# Patient Record
Sex: MALE | Race: BLACK OR AFRICAN AMERICAN | NOT HISPANIC OR LATINO | Employment: UNEMPLOYED | URBAN - METROPOLITAN AREA
[De-identification: names, ages, dates, MRNs, and addresses within clinical notes are randomized per-mention and may not be internally consistent; named-entity substitution may affect disease eponyms.]

---

## 2020-06-03 ENCOUNTER — OFFICE VISIT (OUTPATIENT)
Dept: URGENT CARE | Facility: CLINIC | Age: 50
End: 2020-06-03
Payer: MEDICAID

## 2020-06-03 VITALS
OXYGEN SATURATION: 98 % | HEIGHT: 71 IN | TEMPERATURE: 97 F | WEIGHT: 315 LBS | HEART RATE: 93 BPM | BODY MASS INDEX: 44.1 KG/M2 | RESPIRATION RATE: 18 BRPM

## 2020-06-03 DIAGNOSIS — Z11.9 ENCOUNTER FOR SCREENING EXAMINATION FOR INFECTIOUS DISEASE: Primary | ICD-10-CM

## 2020-06-03 DIAGNOSIS — Z01.84 ANTIBODY RESPONSE EXAMINATION: ICD-10-CM

## 2020-06-03 PROCEDURE — U0003 INFECTIOUS AGENT DETECTION BY NUCLEIC ACID (DNA OR RNA); SEVERE ACUTE RESPIRATORY SYNDROME CORONAVIRUS 2 (SARS-COV-2) (CORONAVIRUS DISEASE [COVID-19]), AMPLIFIED PROBE TECHNIQUE, MAKING USE OF HIGH THROUGHPUT TECHNOLOGIES AS DESCRIBED BY CMS-2020-01-R: HCPCS

## 2020-06-03 PROCEDURE — 99211 PR OFFICE/OUTPT VISIT, EST, LEVL I: ICD-10-PCS | Mod: S$GLB,,, | Performed by: PHYSICIAN ASSISTANT

## 2020-06-03 PROCEDURE — 99211 OFF/OP EST MAY X REQ PHY/QHP: CPT | Mod: S$GLB,,, | Performed by: PHYSICIAN ASSISTANT

## 2020-06-03 NOTE — PROGRESS NOTES
"Subjective:       Patient ID: Haseeb Reilly is a 49 y.o. male.    Vitals:  height is 5' 11" (1.803 m) and weight is 153.8 kg (339 lb) (abnormal). His temperature is 97.3 °F (36.3 °C). His pulse is 93. His respiration is 18 and oxygen saturation is 98%.     Chief Complaint: Labs Only (covid-19 test)    49-year-old male who presents for covid-19 test and antibody testing.  He has no symptoms at this time.  Denies any URI symptoms, abdominal symptoms, chest pain, shortness of breath, focal weakness or deficits, or gait instability.  Denies any recent exposure to cover positive patients or recent travel.          Constitution: Negative for activity change, chills, sweating, fatigue, fever and generalized weakness.   HENT: Negative for ear pain, hearing loss, congestion, postnasal drip, sinus pain, sinus pressure, sore throat, trouble swallowing and voice change.    Neck: Negative for neck stiffness and painful lymph nodes.   Cardiovascular: Negative for chest pain, leg swelling, palpitations, sob on exertion and passing out.   Eyes: Negative for eye discharge, eye pain, eye redness, photophobia, vision loss, double vision and blurred vision.   Respiratory: Negative for chest tightness, cough, sputum production, bloody sputum, COPD, shortness of breath, stridor, wheezing and asthma.    Gastrointestinal: Negative for abdominal pain, nausea, vomiting, diarrhea, rectal bleeding and bowel incontinence.   Genitourinary: Negative for dysuria, frequency, urgency, urine decreased, flank pain, bladder incontinence, hematuria and history of kidney stones.   Musculoskeletal: Negative for joint pain, joint swelling, abnormal ROM of joint, muscle cramps and muscle ache.   Skin: Negative for color change, pale, rash and wound.   Allergic/Immunologic: Negative for seasonal allergies, asthma and immunocompromised state.   Neurological: Negative for dizziness, history of vertigo, light-headedness, passing out, facial drooping, speech " difficulty, coordination disturbances, loss of balance, headaches, disorientation, altered mental status, loss of consciousness, numbness, tingling and seizures.   Hematologic/Lymphatic: Negative for swollen lymph nodes, easy bruising/bleeding and trouble clotting. Does not bruise/bleed easily.   Psychiatric/Behavioral: Negative for altered mental status and disorientation.       Objective:      Physical Exam   Vitals:    06/03/20 1332   Pulse: 93   Resp: 18   Temp: 97.3 °F (36.3 °C)         Patient was seen remotely due to State of Emergency for the COVID-19 outbreak.  Vitals and nursing notes reviewed.       General: well developed, well nourished, no acute distress. Patient does not appear toxic.  Neurologic: Awake, alert and oriented. Thought content appropriate.  Language: no aphasia  Speech: no dysarthria   Motor Strength: Moves all extremities spontaneously with good tone. No abnormal movements seen.    No difficulty transitioning from seated to standing position or vice versa.  Patient is ambulating without any difficulty with normal gait.  normocephalic atraumatic.  normal hearing and responding with full sentences.  Heart: no cyanosis or pallor  Lungs: normal respiratory effort on room air. Breathing is unlabored.  Skin: not diaphoretic        Assessment:       1. Encounter for screening examination for infectious disease    2. Antibody response examination          Patient presents for COVID nasal swab and antibody testing.  Patient is asymptomatic at this time.    Limited physical exam due to COVID-19 concerns.   Patient was counseled, explained with the test results meaning, and answered all of questions.  Patient will be notified of results in the next 24-48 hours.  They can also receive results via my chart.  Printed and verbal COVID guidelines were given.  ED versus clinic precautions given.  Patient verbalized understanding and agreed with plan of care.      Addendum 06/03/2020 at 2:00 p.m.  Unable  to obtain blood draw on 1st attempt.  Patient refused any additional blood draw for COVID antibody testing.  Plan:         Encounter for screening examination for infectious disease  -     COVID-19 Routine Screening    Antibody response examination  -     COVID-19 (SARS CoV-2) IgG Antibody            Patient Instructions     PLEASE READ YOUR DISCHARGE INSTRUCTIONS ENTIRELY AS IT CONTAINS IMPORTANT INFORMATION.    Patient had covid testing done today.  We will notify you of your results in the next 1-3 days. You can also receive the results on my chart.  Discussed corona virus precautions and reviewed CDC FAC; printed a copy for patient.  I discussed to continue to monitor their symptoms. Discussed that if their symptoms persist or worsen to seek re-evaluation. Clinic vs. ER precautions were given.  Patient verbalized understanding and agreed with the above plan of care.      -You must understand that you've received an Urgent Care treatment only and that you may be released before all your medical problems are known or treated. You, the patient, will    arrange for follow up care as instructed. Please arrange follow up with your primary medical clinic within 2-5 days if your signs and symptoms have not resolved or worsen.   - Follow up with your PCP or specialty clinic as directed.  You can call [phone] to schedule an appointment with the appropriate provider.    - If your condition worsens or fails to improve we recommend that you receive another evaluation at the emergency room immediately or contact your primary medical clinic to discuss your concerns.                Instructions for Patients Awaiting COVID-19 Test Results    You will either be called with your test result or it will be released to the patient portal.  If you have any questions about your test, please visit [email or web address] or call our COVID-19 information line at [phone].    Prevention steps for patients with confirmed  or suspected COVID-19       Stay home and stay away from family members and friends. The CDC says, you can leave home after these three things have happened: 1) You have had no fever for at least 72 hours (that is three full days of no fever without the use of medicine that reduces fevers) 2) AND other symptoms have improved (for example, when your cough or shortness of breath have improved) 3) AND at least 7 days have passed since your symptoms first appeared.   Separate yourself from other people and animals in your home.   Call ahead before visiting your doctor.   Wear a facemask.   Cover your coughs and sneezes.   Wash your hands often with soap and water; hand  can be used, too.   Avoid sharing personal household items.   Wipe down surfaces used daily.   Monitor your symptoms. Seek prompt medical attention if your illness is worsening (e.g., difficulty breathing).    Before seeking care, call your healthcare provider.   If you have a medical emergency and need to call 911, notify the dispatch personnel that you have, or are being evaluated for COVID-19. If possible, put on a facemask before emergency medical services arrive.        Recommended precautions for household members, intimate partners, and caregivers in a home setting of a patient with symptomatic laboratory-confirmed COVID-19 or a patient under investigation.  Household members, intimate partners, and caregivers in the home setting awaiting tests results have close contact with a person with symptomatic, laboratory-confirmed COVID-19 or a person under investigation. Close contacts should monitor their health; they should call their provider right away if they develop symptoms suggestive of COVID-19 (e.g., fever, cough, shortness of breath).    Close contacts should also follow these recommendations:   Make sure that you understand and can help the patient follow their provider's instructions for medication(s) and care. You  should help the patient with basic needs in the home and provide support for getting groceries, prescriptions, and other personal needs.   Monitor the patient's symptoms. If the patient is getting sicker, call his or her healthcare provider and tell them that the patient has laboratory-confirmed COVID-19. If the patient has a medical emergency and you need to call 911, notify the dispatch personnel that the patient has, or is being evaluated for COVID-19.   Household members should stay in another room or be  from the patient. Household members should use a separate bedroom and bathroom, if available.   Prohibit visitors.   Household members should care for any pets in the home.   Make sure that shared spaces in the home have good air flow, such as by an air conditioner or an opened window, weather permitting.   Perform hand hygiene frequently. Wash your hands often with soap and water for at least 20 seconds or use an alcohol-based hand  (that contains > 60% alcohol) covering all surfaces of your hands and rubbing them together until they feel dry. Soap and water should be used preferentially.   Avoid touching your eyes, nose, and mouth.   The patient should wear a facemask. If the patient is not able to wear a facemask (for example, because it causes trouble breathing), caregivers should wear a mask when they are in the same room as the patient.   Wear a disposable facemask and gloves when you touch or have contact with the patient's blood, stool, or body fluids, such as saliva, sputum, nasal mucus, vomit, urine.  o Throw out disposable facemasks and gloves after using them. Do not reuse.  o When removing personal protective equipment, first remove and dispose of gloves. Then, immediately clean your hands with soap and water or alcohol-based hand . Next, remove and dispose of facemask, and immediately clean your hands again with soap and water or alcohol-based hand  .   You should not share dishes, drinking glasses, cups, eating utensils, towels, bedding, or other items with the patient. After the patient uses these items, you should wash them thoroughly (see below Wash laundry thoroughly).   Clean all high-touch surfaces, such as counters, tabletops, doorknobs, bathroom fixtures, toilets, phones, keyboards, tablets, and bedside tables, every day. Also, clean any surfaces that may have blood, stool, or body fluids on them.   Use a household cleaning spray or wipe, according to the label instructions. Labels contain instructions for safe and effective use of the cleaning product including precautions you should take when applying the product, such as wearing gloves and making sure you have good ventilation during use of the product.   Wash laundry thoroughly.  o Immediately remove and wash clothes or bedding that have blood, stool, or body fluids on them.  o Wear disposable gloves while handling soiled items and keep soiled items away from your body. Clean your hands (with soap and water or an alcohol-based hand ) immediately after removing your gloves.  o Read and follow directions on labels of laundry or clothing items and detergent. In general, using a normal laundry detergent according to washing machine instructions and dry thoroughly using the warmest temperatures recommended on the clothing label.   Place all used disposable gloves, facemasks, and other contaminated items in a lined container before disposing of them with other household waste. Clean your hands (with soap and water or an alcohol-based hand ) immediately after handling these items. Soap and water should be used preferentially if hands are visibly dirty.   Discuss any additional questions with your state or local health department or healthcare provider. Check available hours when contacting your local health department.    For more information see CDC link below.       https://www.cdc.gov/coronavirus/2019-ncov/hcp/guidance-prevent-spread.html#precautions        Sources:  [facility], [place] [facility]          Instructions for Home Care of Patients and Caretakers with Coronavirus Disease 2019     Limit visitors to the home.  Older persons and those that have chronic medical conditions such as diabetes, lung and heart disease are at increased risk for illness.    If possible, patients should use a separate bedroom while recovering. Caregivers and household members should avoid prolonged contact with the patient which means to stay 6 feet away and avoid contact with cough droplets.  When close contact is necessary, wash your hands before and immediately after contact.    Perform hand hygiene frequently. Wash your hands often with soap and water for at least 20 seconds or use an alcohol-based hand , covering all surfaces of your hands and rubbing them together until they feel dry.    Avoid touching your eyes, nose, and mouth with unwashed hands.   Avoid sharing household items with the patient. You should not share dishes, drinking glasses, cups, eating utensils, towels, bedding, or other items. After the patient uses these items, you should wash them thoroughly.   Wash laundry thoroughly.   o Immediately remove and wash clothes or bedding that have blood, stool, or body fluids on them.   Clean all high-touch surfaces, such as counters, tabletops, doorknobs, bathroom fixtures, toilets, phones, keyboards, tablets, and bedside tables, every day.   o Use a household cleaning spray or wipe, according to the label instructions. Labels contain instructions for safe and effective use of the cleaning product including precautions you should take when applying the product, such as wearing gloves and making sure you have good ventilation during use of the product.    For more information see CDC link below.       https://www.cdc.gov/coronavirus/2019-ncov/hcp/guidance-prevent-spread.html#precautions               If your symptoms worsen or if you have any other concerns, please contact [facility] at [phone].          PLEASE READ YOUR DISCHARGE INSTRUCTIONS ENTIRELY AS IT CONTAINS IMPORTANT INFORMATION.    Patient had covid testing done today.  We will notify you of your results in the next 1-3 days. You can also receive the results on my chart.    Discussed corona virus precautions and reviewed CDC FAC; printed a copy for patient.  I discussed to continue to monitor their symptoms. Discussed that if their symptoms persist or worsen to seek re-evaluation. Clinic vs. ER precautions were given.  Patient verbalized understanding and agreed with the above plan of care.      -You must understand that you've received an Urgent Care treatment only and that you may be released before all your medical problems are known or treated. You, the patient, will    arrange for follow up care as instructed. Please arrange follow up with your primary medical clinic within 2-5 days if your signs and symptoms have not resolved or worsen.   - Follow up with your PCP or specialty clinic as directed.  You can call [phone] to schedule an appointment with the appropriate provider.    - If your condition worsens or fails to improve we recommend that you receive another evaluation at the emergency room immediately or contact your primary medical clinic to discuss your concerns.                Instructions for Patients Awaiting COVID-19 Test Results    You will either be called with your test result or it will be released to the patient portal.  If you have any questions about your test, please visit [email or web address] or call our COVID-19 information line at [phone].    Prevention steps for patients with confirmed or suspected COVID-19       Stay home and stay away from family members and friends. The CDC says, you  can leave home after these three things have happened: 1) You have had no fever for at least 72 hours (that is three full days of no fever without the use of medicine that reduces fevers) 2) AND other symptoms have improved (for example, when your cough or shortness of breath have improved) 3) AND at least 7 days have passed since your symptoms first appeared.   Separate yourself from other people and animals in your home.   Call ahead before visiting your doctor.   Wear a facemask.   Cover your coughs and sneezes.   Wash your hands often with soap and water; hand  can be used, too.   Avoid sharing personal household items.   Wipe down surfaces used daily.   Monitor your symptoms. Seek prompt medical attention if your illness is worsening (e.g., difficulty breathing).    Before seeking care, call your healthcare provider.   If you have a medical emergency and need to call 911, notify the dispatch personnel that you have, or are being evaluated for COVID-19. If possible, put on a facemask before emergency medical services arrive.        Recommended precautions for household members, intimate partners, and caregivers in a home setting of a patient with symptomatic laboratory-confirmed COVID-19 or a patient under investigation.  Household members, intimate partners, and caregivers in the home setting awaiting tests results have close contact with a person with symptomatic, laboratory-confirmed COVID-19 or a person under investigation. Close contacts should monitor their health; they should call their provider right away if they develop symptoms suggestive of COVID-19 (e.g., fever, cough, shortness of breath).    Close contacts should also follow these recommendations:   Make sure that you understand and can help the patient follow their provider's instructions for medication(s) and care. You should help the patient with basic needs in the home and provide support for getting groceries,  prescriptions, and other personal needs.   Monitor the patient's symptoms. If the patient is getting sicker, call his or her healthcare provider and tell them that the patient has laboratory-confirmed COVID-19. If the patient has a medical emergency and you need to call 911, notify the dispatch personnel that the patient has, or is being evaluated for COVID-19.   Household members should stay in another room or be  from the patient. Household members should use a separate bedroom and bathroom, if available.   Prohibit visitors.   Household members should care for any pets in the home.   Make sure that shared spaces in the home have good air flow, such as by an air conditioner or an opened window, weather permitting.   Perform hand hygiene frequently. Wash your hands often with soap and water for at least 20 seconds or use an alcohol-based hand  (that contains > 60% alcohol) covering all surfaces of your hands and rubbing them together until they feel dry. Soap and water should be used preferentially.   Avoid touching your eyes, nose, and mouth.   The patient should wear a facemask. If the patient is not able to wear a facemask (for example, because it causes trouble breathing), caregivers should wear a mask when they are in the same room as the patient.   Wear a disposable facemask and gloves when you touch or have contact with the patient's blood, stool, or body fluids, such as saliva, sputum, nasal mucus, vomit, urine.  o Throw out disposable facemasks and gloves after using them. Do not reuse.  o When removing personal protective equipment, first remove and dispose of gloves. Then, immediately clean your hands with soap and water or alcohol-based hand . Next, remove and dispose of facemask, and immediately clean your hands again with soap and water or alcohol-based hand .   You should not share dishes, drinking glasses, cups, eating utensils, towels, bedding, or other  items with the patient. After the patient uses these items, you should wash them thoroughly (see below Wash laundry thoroughly).   Clean all high-touch surfaces, such as counters, tabletops, doorknobs, bathroom fixtures, toilets, phones, keyboards, tablets, and bedside tables, every day. Also, clean any surfaces that may have blood, stool, or body fluids on them.   Use a household cleaning spray or wipe, according to the label instructions. Labels contain instructions for safe and effective use of the cleaning product including precautions you should take when applying the product, such as wearing gloves and making sure you have good ventilation during use of the product.   Wash laundry thoroughly.  o Immediately remove and wash clothes or bedding that have blood, stool, or body fluids on them.  o Wear disposable gloves while handling soiled items and keep soiled items away from your body. Clean your hands (with soap and water or an alcohol-based hand ) immediately after removing your gloves.  o Read and follow directions on labels of laundry or clothing items and detergent. In general, using a normal laundry detergent according to washing machine instructions and dry thoroughly using the warmest temperatures recommended on the clothing label.   Place all used disposable gloves, facemasks, and other contaminated items in a lined container before disposing of them with other household waste. Clean your hands (with soap and water or an alcohol-based hand ) immediately after handling these items. Soap and water should be used preferentially if hands are visibly dirty.   Discuss any additional questions with your state or local health department or healthcare provider. Check available hours when contacting your local health department.    For more information see CDC link below.      https://www.cdc.gov/coronavirus/2019-ncov/hcp/guidance-prevent-spread.html#precautions        Sources:  CDC,  [place] [facility]          Instructions for Home Care of Patients and Caretakers with Coronavirus Disease 2019     Limit visitors to the home.  Older persons and those that have chronic medical conditions such as diabetes, lung and heart disease are at increased risk for illness.    If possible, patients should use a separate bedroom while recovering. Caregivers and household members should avoid prolonged contact with the patient which means to stay 6 feet away and avoid contact with cough droplets.  When close contact is necessary, wash your hands before and immediately after contact.    Perform hand hygiene frequently. Wash your hands often with soap and water for at least 20 seconds or use an alcohol-based hand , covering all surfaces of your hands and rubbing them together until they feel dry.    Avoid touching your eyes, nose, and mouth with unwashed hands.   Avoid sharing household items with the patient. You should not share dishes, drinking glasses, cups, eating utensils, towels, bedding, or other items. After the patient uses these items, you should wash them thoroughly.   Wash laundry thoroughly.   o Immediately remove and wash clothes or bedding that have blood, stool, or body fluids on them.   Clean all high-touch surfaces, such as counters, tabletops, doorknobs, bathroom fixtures, toilets, phones, keyboards, tablets, and bedside tables, every day.   o Use a household cleaning spray or wipe, according to the label instructions. Labels contain instructions for safe and effective use of the cleaning product including precautions you should take when applying the product, such as wearing gloves and making sure you have good ventilation during use of the product.    For more information see CDC link below.      https://www.cdc.gov/coronavirus/2019-ncov/hcp/guidance-prevent-spread.html#precautions               If your symptoms worsen or if you have any other  concerns, please contact [facility] at [phone].        What does the antibody test tell me?  The antibody test is a blood test that determines if a person's immune system has created antibodies in response to COVID-19. Presence of the antibody indicates the individual has been previously infected with COVID-19. It is important to note that this test does not prove that a person is immune to future infection with COVID-19. Because this virus is new, there is not enough information at this time to determine what defines COVID-19 immunity and how long immunity may last. We understand a test like this could create a false sense of security. It is critical that everyone,regardless of test status or result, continues to follow the latest social distancing, PPE protection and infection control measures.     Those with a positive test should be aware that they have been infected. These individuals are still required to wear appropriate PPE as advised throughout this COVID-19 pandemic. Those with a negative test should be aware that they have not been exposed or developed antibodies to COVID-19. They should maintain the guidance on appropriate PPE as advised throughout this COVID-19 pandemic.         What is the turnaround time of the antibody test?  The antibody test results are usually provided within 24-36 hours of collection, depending on the testing Location.           I've heard that these tests are unreliable?  This test has been vetted through our infectious disease and pathology leadership. This test has not been approved by the U.S. Food and Drug Administration (FDA). This test is currently commercially available under the Emergency Use Authorization, meaning that the FDA is allowing it during this public health emergency. The COVID-19 virus is new and there is no perfect test.           If my antibody test is positive, is there any medication or treatment I should seek?  No, at this time, there is no  "definitive therapy being recommended or used for patients with positive antibody test. Regardless of test status or result, you should continue to follow the latest social distancing, PPE protection and infection control measures.        How will I get my results?  Results will sent to your [facility] patient portal where you can view them. You can download the "Keep Me Certified" wilian in your smart phone's Wilian store.  You can also visit  [email or web address]  to set up your portal. You may contact [email or web address] or [facility] Support Line at [phone] for assistance.                     "

## 2020-06-03 NOTE — PATIENT INSTRUCTIONS
PLEASE READ YOUR DISCHARGE INSTRUCTIONS ENTIRELY AS IT CONTAINS IMPORTANT INFORMATION.    Patient had covid testing done today.  We will notify you of your results in the next 1-3 days. You can also receive the results on my chart.  Discussed corona virus precautions and reviewed CDC FAC; printed a copy for patient.  I discussed to continue to monitor their symptoms. Discussed that if their symptoms persist or worsen to seek re-evaluation. Clinic vs. ER precautions were given.  Patient verbalized understanding and agreed with the above plan of care.      -You must understand that you've received an Urgent Care treatment only and that you may be released before all your medical problems are known or treated. You, the patient, will    arrange for follow up care as instructed. Please arrange follow up with your primary medical clinic within 2-5 days if your signs and symptoms have not resolved or worsen.   - Follow up with your PCP or specialty clinic as directed.  You can call [phone] to schedule an appointment with the appropriate provider.    - If your condition worsens or fails to improve we recommend that you receive another evaluation at the emergency room immediately or contact your primary medical clinic to discuss your concerns.                Instructions for Patients Awaiting COVID-19 Test Results    You will either be called with your test result or it will be released to the patient portal.  If you have any questions about your test, please visit [email or web address] or call our COVID-19 information line at [phone].    Prevention steps for patients with confirmed or suspected COVID-19       Stay home and stay away from family members and friends. The CDC says, you can leave home after these three things have happened: 1) You have had no fever for at least 72 hours (that is three full days of no fever without the use of medicine that reduces fevers) 2) AND other symptoms have  improved (for example, when your cough or shortness of breath have improved) 3) AND at least 7 days have passed since your symptoms first appeared.   Separate yourself from other people and animals in your home.   Call ahead before visiting your doctor.   Wear a facemask.   Cover your coughs and sneezes.   Wash your hands often with soap and water; hand  can be used, too.   Avoid sharing personal household items.   Wipe down surfaces used daily.   Monitor your symptoms. Seek prompt medical attention if your illness is worsening (e.g., difficulty breathing).    Before seeking care, call your healthcare provider.   If you have a medical emergency and need to call 911, notify the dispatch personnel that you have, or are being evaluated for COVID-19. If possible, put on a facemask before emergency medical services arrive.        Recommended precautions for household members, intimate partners, and caregivers in a home setting of a patient with symptomatic laboratory-confirmed COVID-19 or a patient under investigation.  Household members, intimate partners, and caregivers in the home setting awaiting tests results have close contact with a person with symptomatic, laboratory-confirmed COVID-19 or a person under investigation. Close contacts should monitor their health; they should call their provider right away if they develop symptoms suggestive of COVID-19 (e.g., fever, cough, shortness of breath).    Close contacts should also follow these recommendations:   Make sure that you understand and can help the patient follow their provider's instructions for medication(s) and care. You should help the patient with basic needs in the home and provide support for getting groceries, prescriptions, and other personal needs.   Monitor the patient's symptoms. If the patient is getting sicker, call his or her healthcare provider and tell them that the patient has laboratory-confirmed COVID-19. If the patient  has a medical emergency and you need to call 911, notify the dispatch personnel that the patient has, or is being evaluated for COVID-19.   Household members should stay in another room or be  from the patient. Household members should use a separate bedroom and bathroom, if available.   Prohibit visitors.   Household members should care for any pets in the home.   Make sure that shared spaces in the home have good air flow, such as by an air conditioner or an opened window, weather permitting.   Perform hand hygiene frequently. Wash your hands often with soap and water for at least 20 seconds or use an alcohol-based hand  (that contains > 60% alcohol) covering all surfaces of your hands and rubbing them together until they feel dry. Soap and water should be used preferentially.   Avoid touching your eyes, nose, and mouth.   The patient should wear a facemask. If the patient is not able to wear a facemask (for example, because it causes trouble breathing), caregivers should wear a mask when they are in the same room as the patient.   Wear a disposable facemask and gloves when you touch or have contact with the patient's blood, stool, or body fluids, such as saliva, sputum, nasal mucus, vomit, urine.  o Throw out disposable facemasks and gloves after using them. Do not reuse.  o When removing personal protective equipment, first remove and dispose of gloves. Then, immediately clean your hands with soap and water or alcohol-based hand . Next, remove and dispose of facemask, and immediately clean your hands again with soap and water or alcohol-based hand .   You should not share dishes, drinking glasses, cups, eating utensils, towels, bedding, or other items with the patient. After the patient uses these items, you should wash them thoroughly (see below Wash laundry thoroughly).   Clean all high-touch surfaces, such as counters, tabletops, doorknobs, bathroom fixtures,  toilets, phones, keyboards, tablets, and bedside tables, every day. Also, clean any surfaces that may have blood, stool, or body fluids on them.   Use a household cleaning spray or wipe, according to the label instructions. Labels contain instructions for safe and effective use of the cleaning product including precautions you should take when applying the product, such as wearing gloves and making sure you have good ventilation during use of the product.   Wash laundry thoroughly.  o Immediately remove and wash clothes or bedding that have blood, stool, or body fluids on them.  o Wear disposable gloves while handling soiled items and keep soiled items away from your body. Clean your hands (with soap and water or an alcohol-based hand ) immediately after removing your gloves.  o Read and follow directions on labels of laundry or clothing items and detergent. In general, using a normal laundry detergent according to washing machine instructions and dry thoroughly using the warmest temperatures recommended on the clothing label.   Place all used disposable gloves, facemasks, and other contaminated items in a lined container before disposing of them with other household waste. Clean your hands (with soap and water or an alcohol-based hand ) immediately after handling these items. Soap and water should be used preferentially if hands are visibly dirty.   Discuss any additional questions with your state or local health department or healthcare provider. Check available hours when contacting your local health department.    For more information see CDC link below.      https://www.cdc.gov/coronavirus/2019-ncov/hcp/guidance-prevent-spread.html#precautions        Sources:  [facility]          Instructions for Home Care of Patients and Caretakers with Coronavirus Disease 2019     Limit visitors to the home.  Older persons and those that have chronic medical  conditions such as diabetes, lung and heart disease are at increased risk for illness.    If possible, patients should use a separate bedroom while recovering. Caregivers and household members should avoid prolonged contact with the patient which means to stay 6 feet away and avoid contact with cough droplets.  When close contact is necessary, wash your hands before and immediately after contact.    Perform hand hygiene frequently. Wash your hands often with soap and water for at least 20 seconds or use an alcohol-based hand , covering all surfaces of your hands and rubbing them together until they feel dry.    Avoid touching your eyes, nose, and mouth with unwashed hands.   Avoid sharing household items with the patient. You should not share dishes, drinking glasses, cups, eating utensils, towels, bedding, or other items. After the patient uses these items, you should wash them thoroughly.   Wash laundry thoroughly.   o Immediately remove and wash clothes or bedding that have blood, stool, or body fluids on them.   Clean all high-touch surfaces, such as counters, tabletops, doorknobs, bathroom fixtures, toilets, phones, keyboards, tablets, and bedside tables, every day.   o Use a household cleaning spray or wipe, according to the label instructions. Labels contain instructions for safe and effective use of the cleaning product including precautions you should take when applying the product, such as wearing gloves and making sure you have good ventilation during use of the product.    For more information see CDC link below.      https://www.cdc.gov/coronavirus/2019-ncov/hcp/guidance-prevent-spread.html#precautions               If your symptoms worsen or if you have any other concerns, please contact [facility] at [phone].          PLEASE READ YOUR DISCHARGE INSTRUCTIONS ENTIRELY AS IT CONTAINS IMPORTANT INFORMATION.    Patient had covid testing done today.  We will notify you of your  results in the next 1-3 days. You can also receive the results on my chart.    Discussed corona virus precautions and reviewed CDC FAC; printed a copy for patient.  I discussed to continue to monitor their symptoms. Discussed that if their symptoms persist or worsen to seek re-evaluation. Clinic vs. ER precautions were given.  Patient verbalized understanding and agreed with the above plan of care.      -You must understand that you've received an Urgent Care treatment only and that you may be released before all your medical problems are known or treated. You, the patient, will    arrange for follow up care as instructed. Please arrange follow up with your primary medical clinic within 2-5 days if your signs and symptoms have not resolved or worsen.   - Follow up with your PCP or specialty clinic as directed.  You can call [phone] to schedule an appointment with the appropriate provider.    - If your condition worsens or fails to improve we recommend that you receive another evaluation at the emergency room immediately or contact your primary medical clinic to discuss your concerns.                Instructions for Patients Awaiting COVID-19 Test Results    You will either be called with your test result or it will be released to the patient portal.  If you have any questions about your test, please visit [email or web address] or call our COVID-19 information line at [phone].    Prevention steps for patients with confirmed or suspected COVID-19       Stay home and stay away from family members and friends. The CDC says, you can leave home after these three things have happened: 1) You have had no fever for at least 72 hours (that is three full days of no fever without the use of medicine that reduces fevers) 2) AND other symptoms have improved (for example, when your cough or shortness of breath have improved) 3) AND at least 7 days have passed since your symptoms first appeared.   Separate  yourself from other people and animals in your home.   Call ahead before visiting your doctor.   Wear a facemask.   Cover your coughs and sneezes.   Wash your hands often with soap and water; hand  can be used, too.   Avoid sharing personal household items.   Wipe down surfaces used daily.   Monitor your symptoms. Seek prompt medical attention if your illness is worsening (e.g., difficulty breathing).    Before seeking care, call your healthcare provider.   If you have a medical emergency and need to call 911, notify the dispatch personnel that you have, or are being evaluated for COVID-19. If possible, put on a facemask before emergency medical services arrive.        Recommended precautions for household members, intimate partners, and caregivers in a home setting of a patient with symptomatic laboratory-confirmed COVID-19 or a patient under investigation.  Household members, intimate partners, and caregivers in the home setting awaiting tests results have close contact with a person with symptomatic, laboratory-confirmed COVID-19 or a person under investigation. Close contacts should monitor their health; they should call their provider right away if they develop symptoms suggestive of COVID-19 (e.g., fever, cough, shortness of breath).    Close contacts should also follow these recommendations:   Make sure that you understand and can help the patient follow their provider's instructions for medication(s) and care. You should help the patient with basic needs in the home and provide support for getting groceries, prescriptions, and other personal needs.   Monitor the patient's symptoms. If the patient is getting sicker, call his or her healthcare provider and tell them that the patient has laboratory-confirmed COVID-19. If the patient has a medical emergency and you need to call 911, notify the dispatch personnel that the patient has, or is being evaluated for COVID-19.   Household members  should stay in another room or be  from the patient. Household members should use a separate bedroom and bathroom, if available.   Prohibit visitors.   Household members should care for any pets in the home.   Make sure that shared spaces in the home have good air flow, such as by an air conditioner or an opened window, weather permitting.   Perform hand hygiene frequently. Wash your hands often with soap and water for at least 20 seconds or use an alcohol-based hand  (that contains > 60% alcohol) covering all surfaces of your hands and rubbing them together until they feel dry. Soap and water should be used preferentially.   Avoid touching your eyes, nose, and mouth.   The patient should wear a facemask. If the patient is not able to wear a facemask (for example, because it causes trouble breathing), caregivers should wear a mask when they are in the same room as the patient.   Wear a disposable facemask and gloves when you touch or have contact with the patient's blood, stool, or body fluids, such as saliva, sputum, nasal mucus, vomit, urine.  o Throw out disposable facemasks and gloves after using them. Do not reuse.  o When removing personal protective equipment, first remove and dispose of gloves. Then, immediately clean your hands with soap and water or alcohol-based hand . Next, remove and dispose of facemask, and immediately clean your hands again with soap and water or alcohol-based hand .   You should not share dishes, drinking glasses, cups, eating utensils, towels, bedding, or other items with the patient. After the patient uses these items, you should wash them thoroughly (see below Wash laundry thoroughly).   Clean all high-touch surfaces, such as counters, tabletops, doorknobs, bathroom fixtures, toilets, phones, keyboards, tablets, and bedside tables, every day. Also, clean any surfaces that may have blood, stool, or body fluids on them.   Use a  household cleaning spray or wipe, according to the label instructions. Labels contain instructions for safe and effective use of the cleaning product including precautions you should take when applying the product, such as wearing gloves and making sure you have good ventilation during use of the product.   Wash laundry thoroughly.  o Immediately remove and wash clothes or bedding that have blood, stool, or body fluids on them.  o Wear disposable gloves while handling soiled items and keep soiled items away from your body. Clean your hands (with soap and water or an alcohol-based hand ) immediately after removing your gloves.  o Read and follow directions on labels of laundry or clothing items and detergent. In general, using a normal laundry detergent according to washing machine instructions and dry thoroughly using the warmest temperatures recommended on the clothing label.   Place all used disposable gloves, facemasks, and other contaminated items in a lined container before disposing of them with other household waste. Clean your hands (with soap and water or an alcohol-based hand ) immediately after handling these items. Soap and water should be used preferentially if hands are visibly dirty.   Discuss any additional questions with your state or local health department or healthcare provider. Check available hours when contacting your local health department.    For more information see CDC link below.      https://www.cdc.gov/coronavirus/2019-ncov/hcp/guidance-prevent-spread.html#precautions        Sources:  [facility], [place] [facility]          Instructions for Home Care of Patients and Caretakers with Coronavirus Disease 2019     Limit visitors to the home.  Older persons and those that have chronic medical conditions such as diabetes, lung and heart disease are at increased risk for illness.    If possible, patients should use a separate bedroom while recovering.  Caregivers and household members should avoid prolonged contact with the patient which means to stay 6 feet away and avoid contact with cough droplets.  When close contact is necessary, wash your hands before and immediately after contact.    Perform hand hygiene frequently. Wash your hands often with soap and water for at least 20 seconds or use an alcohol-based hand , covering all surfaces of your hands and rubbing them together until they feel dry.    Avoid touching your eyes, nose, and mouth with unwashed hands.   Avoid sharing household items with the patient. You should not share dishes, drinking glasses, cups, eating utensils, towels, bedding, or other items. After the patient uses these items, you should wash them thoroughly.   Wash laundry thoroughly.   o Immediately remove and wash clothes or bedding that have blood, stool, or body fluids on them.   Clean all high-touch surfaces, such as counters, tabletops, doorknobs, bathroom fixtures, toilets, phones, keyboards, tablets, and bedside tables, every day.   o Use a household cleaning spray or wipe, according to the label instructions. Labels contain instructions for safe and effective use of the cleaning product including precautions you should take when applying the product, such as wearing gloves and making sure you have good ventilation during use of the product.    For more information see CDC link below.      https://www.cdc.gov/coronavirus/2019-ncov/hcp/guidance-prevent-spread.html#precautions               If your symptoms worsen or if you have any other concerns, please contact [facility] at [phone].        What does the antibody test tell me?  The antibody test is a blood test that determines if a person's immune system has created antibodies in response to COVID-19. Presence of the antibody indicates the individual has been previously infected with COVID-19. It is important to note that this test does not prove that a  "person is immune to future infection with COVID-19. Because this virus is new, there is not enough information at this time to determine what defines COVID-19 immunity and how long immunity may last. We understand a test like this could create a false sense of security. It is critical that everyone,regardless of test status or result, continues to follow the latest social distancing, PPE protection and infection control measures.     Those with a positive test should be aware that they have been infected. These individuals are still required to wear appropriate PPE as advised throughout this COVID-19 pandemic. Those with a negative test should be aware that they have not been exposed or developed antibodies to COVID-19. They should maintain the guidance on appropriate PPE as advised throughout this COVID-19 pandemic.         What is the turnaround time of the antibody test?  The antibody test results are usually provided within 24-36 hours of collection, depending on the testing Location.           I've heard that these tests are unreliable?  This test has been vetted through our infectious disease and pathology leadership. This test has not been approved by the U.S. Food and Drug Administration (FDA). This test is currently commercially available under the Emergency Use Authorization, meaning that the FDA is allowing it during this public health emergency. The COVID-19 virus is new and there is no perfect test.           If my antibody test is positive, is there any medication or treatment I should seek?  No, at this time, there is no definitive therapy being recommended or used for patients with positive antibody test. Regardless of test status or result, you should continue to follow the latest social distancing, PPE protection and infection control measures.        How will I get my results?  Results will sent to your [facility] patient portal where you can view them. You can download the "[employer]" cherelle in your " smart phone's Wilian store.  You can also visit  [email or web address]  to set up your portal. You may contact [email or web address] or [facility] Patient Support Line at [phone] for assistance.

## 2020-06-04 LAB — SARS-COV-2 RNA RESP QL NAA+PROBE: NOT DETECTED

## 2020-06-08 ENCOUNTER — TELEPHONE (OUTPATIENT)
Dept: URGENT CARE | Facility: CLINIC | Age: 50
End: 2020-06-08

## 2021-03-11 LAB — MICROALB/CREAT RATIO: NORMAL

## 2021-03-29 ENCOUNTER — NURSE TRIAGE (OUTPATIENT)
Dept: ADMINISTRATIVE | Facility: CLINIC | Age: 51
End: 2021-03-29

## 2021-04-12 ENCOUNTER — OFFICE VISIT (OUTPATIENT)
Dept: PRIMARY CARE CLINIC | Facility: CLINIC | Age: 51
End: 2021-04-12
Payer: MEDICAID

## 2021-04-12 VITALS
WEIGHT: 294.88 LBS | RESPIRATION RATE: 18 BRPM | HEIGHT: 71 IN | HEART RATE: 91 BPM | DIASTOLIC BLOOD PRESSURE: 76 MMHG | OXYGEN SATURATION: 97 % | SYSTOLIC BLOOD PRESSURE: 132 MMHG | BODY MASS INDEX: 41.28 KG/M2

## 2021-04-12 DIAGNOSIS — G89.29 CHRONIC RIGHT-SIDED LOW BACK PAIN WITH RIGHT-SIDED SCIATICA: Primary | ICD-10-CM

## 2021-04-12 DIAGNOSIS — E66.01 CLASS 3 SEVERE OBESITY WITH BODY MASS INDEX (BMI) OF 40.0 TO 44.9 IN ADULT, UNSPECIFIED OBESITY TYPE, UNSPECIFIED WHETHER SERIOUS COMORBIDITY PRESENT: ICD-10-CM

## 2021-04-12 DIAGNOSIS — M54.41 CHRONIC RIGHT-SIDED LOW BACK PAIN WITH RIGHT-SIDED SCIATICA: Primary | ICD-10-CM

## 2021-04-12 DIAGNOSIS — E11.9 TYPE 2 DIABETES MELLITUS WITHOUT COMPLICATION, WITHOUT LONG-TERM CURRENT USE OF INSULIN: ICD-10-CM

## 2021-04-12 DIAGNOSIS — K59.00 CONSTIPATION, UNSPECIFIED CONSTIPATION TYPE: ICD-10-CM

## 2021-04-12 DIAGNOSIS — I10 ESSENTIAL HYPERTENSION: ICD-10-CM

## 2021-04-12 DIAGNOSIS — E78.5 HYPERLIPIDEMIA, UNSPECIFIED HYPERLIPIDEMIA TYPE: ICD-10-CM

## 2021-04-12 PROBLEM — E66.813 CLASS 3 SEVERE OBESITY WITH BODY MASS INDEX (BMI) OF 40.0 TO 44.9 IN ADULT: Status: ACTIVE | Noted: 2021-04-12

## 2021-04-12 PROCEDURE — 99999 PR PBB SHADOW E&M-EST. PATIENT-LVL IV: ICD-10-PCS | Mod: PBBFAC,,, | Performed by: FAMILY MEDICINE

## 2021-04-12 PROCEDURE — 99214 OFFICE O/P EST MOD 30 MIN: CPT | Mod: PBBFAC,PN | Performed by: FAMILY MEDICINE

## 2021-04-12 PROCEDURE — 99214 OFFICE O/P EST MOD 30 MIN: CPT | Mod: S$PBB,,, | Performed by: FAMILY MEDICINE

## 2021-04-12 PROCEDURE — 99999 PR PBB SHADOW E&M-EST. PATIENT-LVL IV: CPT | Mod: PBBFAC,,, | Performed by: FAMILY MEDICINE

## 2021-04-12 PROCEDURE — 99214 PR OFFICE/OUTPT VISIT, EST, LEVL IV, 30-39 MIN: ICD-10-PCS | Mod: S$PBB,,, | Performed by: FAMILY MEDICINE

## 2021-04-12 RX ORDER — PANTOPRAZOLE SODIUM 40 MG/1
TABLET, DELAYED RELEASE ORAL
COMMUNITY
End: 2021-05-12 | Stop reason: SDUPTHER

## 2021-04-12 RX ORDER — AMITRIPTYLINE HYDROCHLORIDE 25 MG/1
25 TABLET, FILM COATED ORAL 2 TIMES DAILY
COMMUNITY
End: 2021-04-12

## 2021-04-12 RX ORDER — EMPAGLIFLOZIN 10 MG/1
10 TABLET, FILM COATED ORAL ONCE
COMMUNITY
End: 2021-05-12 | Stop reason: SDUPTHER

## 2021-04-12 RX ORDER — FAMOTIDINE 40 MG/1
40 TABLET, FILM COATED ORAL DAILY
COMMUNITY
End: 2021-04-12

## 2021-04-12 RX ORDER — LIRAGLUTIDE 6 MG/ML
INJECTION SUBCUTANEOUS
COMMUNITY
End: 2021-05-12 | Stop reason: SDUPTHER

## 2021-04-12 RX ORDER — ASPIRIN 325 MG
50000 TABLET, DELAYED RELEASE (ENTERIC COATED) ORAL WEEKLY
COMMUNITY
End: 2023-09-19

## 2021-04-12 RX ORDER — SUCRALFATE 1 G/1
TABLET ORAL
COMMUNITY
Start: 2021-03-23 | End: 2022-07-18 | Stop reason: SDUPTHER

## 2021-04-12 RX ORDER — METHOCARBAMOL 750 MG/1
TABLET, FILM COATED ORAL
COMMUNITY
End: 2021-04-12

## 2021-04-12 RX ORDER — POTASSIUM CHLORIDE 600 MG/1
TABLET, FILM COATED, EXTENDED RELEASE ORAL
COMMUNITY
End: 2021-05-12 | Stop reason: SDUPTHER

## 2021-04-12 RX ORDER — METFORMIN HYDROCHLORIDE 1000 MG/1
TABLET ORAL
COMMUNITY
End: 2021-05-12 | Stop reason: SDUPTHER

## 2021-05-10 ENCOUNTER — NURSE TRIAGE (OUTPATIENT)
Dept: ADMINISTRATIVE | Facility: CLINIC | Age: 51
End: 2021-05-10

## 2021-05-11 ENCOUNTER — TELEPHONE (OUTPATIENT)
Dept: PRIMARY CARE CLINIC | Facility: CLINIC | Age: 51
End: 2021-05-11

## 2021-05-11 ENCOUNTER — CLINICAL SUPPORT (OUTPATIENT)
Dept: REHABILITATION | Facility: HOSPITAL | Age: 51
End: 2021-05-11
Payer: MEDICAID

## 2021-05-11 DIAGNOSIS — M53.86 DECREASED ROM OF LUMBAR SPINE: ICD-10-CM

## 2021-05-11 DIAGNOSIS — G89.29 CHRONIC RIGHT-SIDED LOW BACK PAIN WITH RIGHT-SIDED SCIATICA: ICD-10-CM

## 2021-05-11 DIAGNOSIS — R53.1 DECREASED STRENGTH: ICD-10-CM

## 2021-05-11 DIAGNOSIS — M54.41 CHRONIC RIGHT-SIDED LOW BACK PAIN WITH RIGHT-SIDED SCIATICA: ICD-10-CM

## 2021-05-11 PROCEDURE — 97162 PT EVAL MOD COMPLEX 30 MIN: CPT | Mod: PN

## 2021-05-11 RX ORDER — FUROSEMIDE 40 MG/1
40 TABLET ORAL DAILY
Qty: 90 TABLET | Refills: 3 | Status: SHIPPED | OUTPATIENT
Start: 2021-05-11 | End: 2021-05-12 | Stop reason: SDUPTHER

## 2021-05-12 ENCOUNTER — LAB VISIT (OUTPATIENT)
Dept: PRIMARY CARE CLINIC | Facility: CLINIC | Age: 51
End: 2021-05-12
Payer: MEDICAID

## 2021-05-12 ENCOUNTER — OFFICE VISIT (OUTPATIENT)
Dept: PRIMARY CARE CLINIC | Facility: CLINIC | Age: 51
End: 2021-05-12
Payer: MEDICAID

## 2021-05-12 VITALS
WEIGHT: 290.44 LBS | HEART RATE: 102 BPM | BODY MASS INDEX: 40.66 KG/M2 | HEIGHT: 71 IN | TEMPERATURE: 98 F | SYSTOLIC BLOOD PRESSURE: 120 MMHG | DIASTOLIC BLOOD PRESSURE: 80 MMHG | OXYGEN SATURATION: 98 % | RESPIRATION RATE: 18 BRPM

## 2021-05-12 DIAGNOSIS — Z00.00 ANNUAL PHYSICAL EXAM: ICD-10-CM

## 2021-05-12 DIAGNOSIS — G89.29 CHRONIC RIGHT-SIDED LOW BACK PAIN WITH RIGHT-SIDED SCIATICA: Primary | ICD-10-CM

## 2021-05-12 DIAGNOSIS — K62.89 RECTAL MASS: ICD-10-CM

## 2021-05-12 DIAGNOSIS — I10 ESSENTIAL HYPERTENSION: ICD-10-CM

## 2021-05-12 DIAGNOSIS — E11.9 TYPE 2 DIABETES MELLITUS WITHOUT COMPLICATION, WITHOUT LONG-TERM CURRENT USE OF INSULIN: ICD-10-CM

## 2021-05-12 DIAGNOSIS — E78.5 HYPERLIPIDEMIA, UNSPECIFIED HYPERLIPIDEMIA TYPE: ICD-10-CM

## 2021-05-12 DIAGNOSIS — M54.41 CHRONIC RIGHT-SIDED LOW BACK PAIN WITH RIGHT-SIDED SCIATICA: Primary | ICD-10-CM

## 2021-05-12 DIAGNOSIS — K21.9 GASTRIC REFLUX: ICD-10-CM

## 2021-05-12 PROBLEM — M53.86 DECREASED ROM OF LUMBAR SPINE: Status: ACTIVE | Noted: 2021-05-12

## 2021-05-12 PROBLEM — R53.1 DECREASED STRENGTH: Status: ACTIVE | Noted: 2021-05-12

## 2021-05-12 PROCEDURE — 99999 PR PBB SHADOW E&M-EST. PATIENT-LVL IV: CPT | Mod: PBBFAC,,, | Performed by: FAMILY MEDICINE

## 2021-05-12 PROCEDURE — 99214 PR OFFICE/OUTPT VISIT, EST, LEVL IV, 30-39 MIN: ICD-10-PCS | Mod: S$PBB,,, | Performed by: FAMILY MEDICINE

## 2021-05-12 PROCEDURE — 80061 LIPID PANEL: CPT | Performed by: FAMILY MEDICINE

## 2021-05-12 PROCEDURE — 99214 OFFICE O/P EST MOD 30 MIN: CPT | Mod: PBBFAC,PN | Performed by: FAMILY MEDICINE

## 2021-05-12 PROCEDURE — 36415 COLL VENOUS BLD VENIPUNCTURE: CPT | Mod: PBBFAC,PN | Performed by: FAMILY MEDICINE

## 2021-05-12 PROCEDURE — 99214 OFFICE O/P EST MOD 30 MIN: CPT | Mod: S$PBB,,, | Performed by: FAMILY MEDICINE

## 2021-05-12 PROCEDURE — 99999 PR PBB SHADOW E&M-EST. PATIENT-LVL IV: ICD-10-PCS | Mod: PBBFAC,,, | Performed by: FAMILY MEDICINE

## 2021-05-12 PROCEDURE — 83036 HEMOGLOBIN GLYCOSYLATED A1C: CPT | Performed by: FAMILY MEDICINE

## 2021-05-12 RX ORDER — ATORVASTATIN CALCIUM 40 MG/1
40 TABLET, FILM COATED ORAL NIGHTLY
Qty: 90 TABLET | Refills: 3 | Status: SHIPPED | OUTPATIENT
Start: 2021-05-12 | End: 2022-05-25 | Stop reason: SDUPTHER

## 2021-05-12 RX ORDER — EMPAGLIFLOZIN 10 MG/1
10 TABLET, FILM COATED ORAL DAILY
Qty: 90 TABLET | Refills: 3 | Status: SHIPPED | OUTPATIENT
Start: 2021-05-12 | End: 2022-05-25

## 2021-05-12 RX ORDER — PANTOPRAZOLE SODIUM 40 MG/1
40 TABLET, DELAYED RELEASE ORAL DAILY
Qty: 90 TABLET | Refills: 3 | Status: SHIPPED | OUTPATIENT
Start: 2021-05-12 | End: 2022-03-22 | Stop reason: SDUPTHER

## 2021-05-12 RX ORDER — POTASSIUM CHLORIDE 600 MG/1
8 TABLET, FILM COATED, EXTENDED RELEASE ORAL DAILY
Qty: 90 TABLET | Refills: 3 | Status: SHIPPED | OUTPATIENT
Start: 2021-05-12 | End: 2022-07-18

## 2021-05-12 RX ORDER — FUROSEMIDE 40 MG/1
40 TABLET ORAL DAILY
Qty: 90 TABLET | Refills: 3 | Status: SHIPPED | OUTPATIENT
Start: 2021-05-12 | End: 2022-05-16 | Stop reason: SDUPTHER

## 2021-05-12 RX ORDER — METFORMIN HYDROCHLORIDE 1000 MG/1
1000 TABLET ORAL 2 TIMES DAILY WITH MEALS
Qty: 180 TABLET | Refills: 3 | Status: SHIPPED | OUTPATIENT
Start: 2021-05-12 | End: 2022-06-21

## 2021-05-12 RX ORDER — PEN NEEDLE, DIABETIC 30 GX3/16"
1 NEEDLE, DISPOSABLE MISCELLANEOUS DAILY
Qty: 100 EACH | Refills: 1 | Status: SHIPPED | OUTPATIENT
Start: 2021-05-12 | End: 2022-04-04

## 2021-05-12 RX ORDER — LIRAGLUTIDE 6 MG/ML
1.8 INJECTION SUBCUTANEOUS DAILY
Qty: 27 ML | Refills: 3 | Status: SHIPPED | OUTPATIENT
Start: 2021-05-12 | End: 2021-09-10 | Stop reason: SDUPTHER

## 2021-05-13 DIAGNOSIS — I10 ESSENTIAL HYPERTENSION: ICD-10-CM

## 2021-05-13 LAB
CHOLEST SERPL-MCNC: 129 MG/DL (ref 120–199)
CHOLEST/HDLC SERPL: 4.3 {RATIO} (ref 2–5)
ESTIMATED AVG GLUCOSE: 157 MG/DL (ref 68–131)
HBA1C MFR BLD: 7.1 % (ref 4–5.6)
HDLC SERPL-MCNC: 30 MG/DL (ref 40–75)
HDLC SERPL: 23.3 % (ref 20–50)
LDLC SERPL CALC-MCNC: 62 MG/DL (ref 63–159)
NONHDLC SERPL-MCNC: 99 MG/DL
TRIGL SERPL-MCNC: 185 MG/DL (ref 30–150)

## 2021-05-13 RX ORDER — FUROSEMIDE 40 MG/1
40 TABLET ORAL DAILY
Qty: 90 TABLET | Refills: 3 | OUTPATIENT
Start: 2021-05-13

## 2021-05-27 ENCOUNTER — CLINICAL SUPPORT (OUTPATIENT)
Dept: REHABILITATION | Facility: HOSPITAL | Age: 51
End: 2021-05-27
Payer: MEDICAID

## 2021-05-27 DIAGNOSIS — R53.1 DECREASED STRENGTH: ICD-10-CM

## 2021-05-27 DIAGNOSIS — M53.86 DECREASED ROM OF LUMBAR SPINE: ICD-10-CM

## 2021-05-27 PROCEDURE — 97110 THERAPEUTIC EXERCISES: CPT | Mod: PN | Performed by: PHYSICAL THERAPIST

## 2021-06-18 ENCOUNTER — CLINICAL SUPPORT (OUTPATIENT)
Dept: REHABILITATION | Facility: HOSPITAL | Age: 51
End: 2021-06-18
Payer: MEDICAID

## 2021-06-18 DIAGNOSIS — M53.86 DECREASED ROM OF LUMBAR SPINE: ICD-10-CM

## 2021-06-18 DIAGNOSIS — R53.1 DECREASED STRENGTH: ICD-10-CM

## 2021-06-18 PROCEDURE — 97110 THERAPEUTIC EXERCISES: CPT | Mod: PN,CQ

## 2021-06-21 ENCOUNTER — CLINICAL SUPPORT (OUTPATIENT)
Dept: REHABILITATION | Facility: HOSPITAL | Age: 51
End: 2021-06-21
Payer: MEDICAID

## 2021-06-21 DIAGNOSIS — R53.1 DECREASED STRENGTH: ICD-10-CM

## 2021-06-21 DIAGNOSIS — M53.86 DECREASED ROM OF LUMBAR SPINE: ICD-10-CM

## 2021-06-21 PROCEDURE — 97110 THERAPEUTIC EXERCISES: CPT | Mod: PN | Performed by: PHYSICAL THERAPIST

## 2021-06-24 DIAGNOSIS — E11.9 TYPE 2 DIABETES MELLITUS WITHOUT COMPLICATION, UNSPECIFIED WHETHER LONG TERM INSULIN USE: ICD-10-CM

## 2021-06-24 DIAGNOSIS — E11.9 TYPE 2 DIABETES MELLITUS WITHOUT COMPLICATION: ICD-10-CM

## 2021-06-25 ENCOUNTER — CLINICAL SUPPORT (OUTPATIENT)
Dept: REHABILITATION | Facility: HOSPITAL | Age: 51
End: 2021-06-25
Payer: MEDICAID

## 2021-06-25 DIAGNOSIS — M53.86 DECREASED ROM OF LUMBAR SPINE: ICD-10-CM

## 2021-06-25 DIAGNOSIS — R53.1 DECREASED STRENGTH: ICD-10-CM

## 2021-06-25 PROCEDURE — 97110 THERAPEUTIC EXERCISES: CPT | Mod: PN,CQ

## 2021-06-28 ENCOUNTER — CLINICAL SUPPORT (OUTPATIENT)
Dept: REHABILITATION | Facility: HOSPITAL | Age: 51
End: 2021-06-28
Payer: MEDICAID

## 2021-06-28 ENCOUNTER — PATIENT OUTREACH (OUTPATIENT)
Dept: ADMINISTRATIVE | Facility: HOSPITAL | Age: 51
End: 2021-06-28

## 2021-06-28 DIAGNOSIS — M53.86 DECREASED ROM OF LUMBAR SPINE: ICD-10-CM

## 2021-06-28 DIAGNOSIS — R53.1 DECREASED STRENGTH: ICD-10-CM

## 2021-06-28 PROCEDURE — 97110 THERAPEUTIC EXERCISES: CPT | Mod: PN,CQ

## 2021-07-01 ENCOUNTER — CLINICAL SUPPORT (OUTPATIENT)
Dept: REHABILITATION | Facility: HOSPITAL | Age: 51
End: 2021-07-01
Payer: MEDICAID

## 2021-07-01 DIAGNOSIS — R53.1 DECREASED STRENGTH: ICD-10-CM

## 2021-07-01 DIAGNOSIS — M53.86 DECREASED ROM OF LUMBAR SPINE: ICD-10-CM

## 2021-07-01 PROCEDURE — 97110 THERAPEUTIC EXERCISES: CPT | Mod: PN,CQ

## 2021-07-07 ENCOUNTER — CLINICAL SUPPORT (OUTPATIENT)
Dept: REHABILITATION | Facility: HOSPITAL | Age: 51
End: 2021-07-07
Payer: MEDICAID

## 2021-07-07 DIAGNOSIS — R53.1 DECREASED STRENGTH: ICD-10-CM

## 2021-07-07 DIAGNOSIS — M53.86 DECREASED ROM OF LUMBAR SPINE: ICD-10-CM

## 2021-07-07 PROCEDURE — 97110 THERAPEUTIC EXERCISES: CPT | Mod: PN | Performed by: PHYSICAL THERAPIST

## 2021-07-12 ENCOUNTER — OFFICE VISIT (OUTPATIENT)
Dept: PRIMARY CARE CLINIC | Facility: CLINIC | Age: 51
End: 2021-07-12
Payer: MEDICAID

## 2021-07-12 VITALS
HEIGHT: 71 IN | OXYGEN SATURATION: 97 % | DIASTOLIC BLOOD PRESSURE: 78 MMHG | SYSTOLIC BLOOD PRESSURE: 120 MMHG | BODY MASS INDEX: 39.6 KG/M2 | HEART RATE: 88 BPM | WEIGHT: 282.88 LBS

## 2021-07-12 DIAGNOSIS — M54.41 CHRONIC RIGHT-SIDED LOW BACK PAIN WITH RIGHT-SIDED SCIATICA: Primary | ICD-10-CM

## 2021-07-12 DIAGNOSIS — K59.00 CONSTIPATION, UNSPECIFIED CONSTIPATION TYPE: ICD-10-CM

## 2021-07-12 DIAGNOSIS — G89.29 CHRONIC RIGHT-SIDED LOW BACK PAIN WITH RIGHT-SIDED SCIATICA: Primary | ICD-10-CM

## 2021-07-12 DIAGNOSIS — R10.30 LOWER ABDOMINAL PAIN: ICD-10-CM

## 2021-07-12 DIAGNOSIS — R11.0 CHRONIC NAUSEA: ICD-10-CM

## 2021-07-12 PROCEDURE — 99214 PR OFFICE/OUTPT VISIT, EST, LEVL IV, 30-39 MIN: ICD-10-PCS | Mod: S$PBB,,, | Performed by: FAMILY MEDICINE

## 2021-07-12 PROCEDURE — 99214 OFFICE O/P EST MOD 30 MIN: CPT | Mod: S$PBB,,, | Performed by: FAMILY MEDICINE

## 2021-07-12 PROCEDURE — 99999 PR PBB SHADOW E&M-EST. PATIENT-LVL V: ICD-10-PCS | Mod: PBBFAC,,, | Performed by: FAMILY MEDICINE

## 2021-07-12 PROCEDURE — 99999 PR PBB SHADOW E&M-EST. PATIENT-LVL V: CPT | Mod: PBBFAC,,, | Performed by: FAMILY MEDICINE

## 2021-07-12 PROCEDURE — 99215 OFFICE O/P EST HI 40 MIN: CPT | Mod: PBBFAC,PN | Performed by: FAMILY MEDICINE

## 2021-07-12 RX ORDER — FAMOTIDINE 40 MG/1
TABLET, FILM COATED ORAL
COMMUNITY
Start: 2021-06-23 | End: 2022-02-09 | Stop reason: SDUPTHER

## 2021-07-12 RX ORDER — ONDANSETRON 4 MG/1
4 TABLET, ORALLY DISINTEGRATING ORAL EVERY 6 HOURS PRN
Qty: 60 TABLET | Refills: 1 | Status: SHIPPED | OUTPATIENT
Start: 2021-07-12 | End: 2021-08-03

## 2021-07-12 RX ORDER — ONDANSETRON 4 MG/1
TABLET, FILM COATED ORAL
COMMUNITY
Start: 2021-07-01 | End: 2021-08-03

## 2021-07-12 RX ORDER — PEN NEEDLE, DIABETIC 30 GX3/16"
NEEDLE, DISPOSABLE MISCELLANEOUS
COMMUNITY
End: 2023-09-19

## 2021-07-12 RX ORDER — DULOXETIN HYDROCHLORIDE 60 MG/1
60 CAPSULE, DELAYED RELEASE ORAL DAILY
Qty: 90 CAPSULE | Refills: 3 | Status: SHIPPED | OUTPATIENT
Start: 2021-07-19 | End: 2022-09-19

## 2021-07-12 RX ORDER — LISINOPRIL AND HYDROCHLOROTHIAZIDE 20; 25 MG/1; MG/1
TABLET ORAL
COMMUNITY
Start: 2021-02-25 | End: 2021-12-13 | Stop reason: SDUPTHER

## 2021-07-12 RX ORDER — DILTIAZEM HYDROCHLORIDE 120 MG/1
CAPSULE, EXTENDED RELEASE ORAL
COMMUNITY
Start: 2021-06-14 | End: 2023-07-05

## 2021-07-12 RX ORDER — DEXTROSE 4 G
TABLET,CHEWABLE ORAL
COMMUNITY
End: 2023-09-19

## 2021-07-12 RX ORDER — PEN NEEDLE, DIABETIC 29 G X1/2"
NEEDLE, DISPOSABLE MISCELLANEOUS
COMMUNITY
End: 2023-09-19

## 2021-07-12 RX ORDER — SODIUM, POTASSIUM,MAG SULFATES 17.5-3.13G
SOLUTION, RECONSTITUTED, ORAL ORAL
COMMUNITY
Start: 2021-03-31 | End: 2021-09-10

## 2021-07-12 RX ORDER — DULOXETIN HYDROCHLORIDE 30 MG/1
30 CAPSULE, DELAYED RELEASE ORAL DAILY
Qty: 7 CAPSULE | Refills: 0 | Status: SHIPPED | OUTPATIENT
Start: 2021-07-12 | End: 2021-07-19

## 2021-07-12 RX ORDER — CYCLOBENZAPRINE HCL 10 MG
10 TABLET ORAL 3 TIMES DAILY PRN
Qty: 90 TABLET | Refills: 3 | Status: SHIPPED | OUTPATIENT
Start: 2021-07-12 | End: 2022-04-04

## 2021-07-13 ENCOUNTER — HOSPITAL ENCOUNTER (OUTPATIENT)
Dept: RADIOLOGY | Facility: HOSPITAL | Age: 51
Discharge: HOME | End: 2021-07-13
Attending: FAMILY MEDICINE
Payer: MEDICAID

## 2021-07-13 DIAGNOSIS — R10.30 LOWER ABDOMINAL PAIN: ICD-10-CM

## 2021-07-13 DIAGNOSIS — G89.29 CHRONIC RIGHT-SIDED LOW BACK PAIN WITH RIGHT-SIDED SCIATICA: ICD-10-CM

## 2021-07-13 DIAGNOSIS — M54.41 CHRONIC RIGHT-SIDED LOW BACK PAIN WITH RIGHT-SIDED SCIATICA: ICD-10-CM

## 2021-07-13 PROCEDURE — 74019 RADEX ABDOMEN 2 VIEWS: CPT | Mod: TC,FY,PO

## 2021-07-13 PROCEDURE — 72110 X-RAY EXAM L-2 SPINE 4/>VWS: CPT | Mod: TC,FY,PO

## 2021-07-14 ENCOUNTER — CLINICAL SUPPORT (OUTPATIENT)
Dept: REHABILITATION | Facility: HOSPITAL | Age: 51
End: 2021-07-14
Payer: MEDICAID

## 2021-07-14 DIAGNOSIS — R53.1 DECREASED STRENGTH: ICD-10-CM

## 2021-07-14 DIAGNOSIS — M53.86 DECREASED ROM OF LUMBAR SPINE: ICD-10-CM

## 2021-07-14 PROCEDURE — 97110 THERAPEUTIC EXERCISES: CPT | Mod: PN | Performed by: PHYSICAL THERAPIST

## 2021-07-15 ENCOUNTER — HOSPITAL ENCOUNTER (OUTPATIENT)
Dept: RADIOLOGY | Facility: HOSPITAL | Age: 51
Discharge: HOME | End: 2021-07-15
Attending: FAMILY MEDICINE
Payer: MEDICAID

## 2021-07-15 DIAGNOSIS — G89.29 CHRONIC RIGHT-SIDED LOW BACK PAIN WITH RIGHT-SIDED SCIATICA: ICD-10-CM

## 2021-07-15 DIAGNOSIS — M54.41 CHRONIC RIGHT-SIDED LOW BACK PAIN WITH RIGHT-SIDED SCIATICA: ICD-10-CM

## 2021-07-15 PROCEDURE — 72148 MRI LUMBAR SPINE W/O DYE: CPT | Mod: TC,PO

## 2021-07-20 ENCOUNTER — OFFICE VISIT (OUTPATIENT)
Dept: NEUROSURGERY | Facility: CLINIC | Age: 51
End: 2021-07-20
Payer: MEDICAID

## 2021-07-20 VITALS — DIASTOLIC BLOOD PRESSURE: 84 MMHG | HEART RATE: 118 BPM | SYSTOLIC BLOOD PRESSURE: 128 MMHG

## 2021-07-20 DIAGNOSIS — E66.01 CLASS 3 SEVERE OBESITY WITH BODY MASS INDEX (BMI) OF 40.0 TO 44.9 IN ADULT, UNSPECIFIED OBESITY TYPE, UNSPECIFIED WHETHER SERIOUS COMORBIDITY PRESENT: ICD-10-CM

## 2021-07-20 DIAGNOSIS — R53.1 DECREASED STRENGTH: Primary | ICD-10-CM

## 2021-07-20 DIAGNOSIS — G89.29 CHRONIC RIGHT-SIDED LOW BACK PAIN WITH RIGHT-SIDED SCIATICA: ICD-10-CM

## 2021-07-20 DIAGNOSIS — R20.2 NUMBNESS AND TINGLING OF UPPER AND LOWER EXTREMITIES OF BOTH SIDES: ICD-10-CM

## 2021-07-20 DIAGNOSIS — R20.0 NUMBNESS AND TINGLING OF UPPER AND LOWER EXTREMITIES OF BOTH SIDES: ICD-10-CM

## 2021-07-20 DIAGNOSIS — M54.2 CERVICALGIA: ICD-10-CM

## 2021-07-20 DIAGNOSIS — M54.41 CHRONIC RIGHT-SIDED LOW BACK PAIN WITH RIGHT-SIDED SCIATICA: ICD-10-CM

## 2021-07-20 DIAGNOSIS — M54.12 RADICULOPATHY, CERVICAL REGION: ICD-10-CM

## 2021-07-20 PROCEDURE — 99215 OFFICE O/P EST HI 40 MIN: CPT | Mod: PBBFAC | Performed by: NURSE PRACTITIONER

## 2021-07-20 PROCEDURE — 99999 PR PBB SHADOW E&M-EST. PATIENT-LVL V: CPT | Mod: PBBFAC,,, | Performed by: NURSE PRACTITIONER

## 2021-07-20 PROCEDURE — 99214 PR OFFICE/OUTPT VISIT, EST, LEVL IV, 30-39 MIN: ICD-10-PCS | Mod: S$PBB,,, | Performed by: NURSE PRACTITIONER

## 2021-07-20 PROCEDURE — 99214 OFFICE O/P EST MOD 30 MIN: CPT | Mod: S$PBB,,, | Performed by: NURSE PRACTITIONER

## 2021-07-20 PROCEDURE — 99999 PR PBB SHADOW E&M-EST. PATIENT-LVL V: ICD-10-PCS | Mod: PBBFAC,,, | Performed by: NURSE PRACTITIONER

## 2021-07-21 ENCOUNTER — TELEPHONE (OUTPATIENT)
Dept: BARIATRICS | Facility: CLINIC | Age: 51
End: 2021-07-21

## 2021-07-22 ENCOUNTER — DOCUMENTATION ONLY (OUTPATIENT)
Dept: REHABILITATION | Facility: HOSPITAL | Age: 51
End: 2021-07-22

## 2021-07-26 ENCOUNTER — CLINICAL SUPPORT (OUTPATIENT)
Dept: REHABILITATION | Facility: HOSPITAL | Age: 51
End: 2021-07-26
Payer: MEDICAID

## 2021-07-26 DIAGNOSIS — M53.86 DECREASED ROM OF LUMBAR SPINE: ICD-10-CM

## 2021-07-26 DIAGNOSIS — R53.1 DECREASED STRENGTH: ICD-10-CM

## 2021-07-26 PROCEDURE — 97110 THERAPEUTIC EXERCISES: CPT | Mod: PN | Performed by: PHYSICAL THERAPIST

## 2021-07-28 ENCOUNTER — CLINICAL SUPPORT (OUTPATIENT)
Dept: REHABILITATION | Facility: HOSPITAL | Age: 51
End: 2021-07-28
Payer: MEDICAID

## 2021-07-28 DIAGNOSIS — R53.1 DECREASED STRENGTH: ICD-10-CM

## 2021-07-28 DIAGNOSIS — M53.86 DECREASED ROM OF LUMBAR SPINE: ICD-10-CM

## 2021-07-28 PROCEDURE — 97110 THERAPEUTIC EXERCISES: CPT | Mod: PN | Performed by: PHYSICAL THERAPIST

## 2021-08-02 ENCOUNTER — PATIENT OUTREACH (OUTPATIENT)
Dept: ADMINISTRATIVE | Facility: OTHER | Age: 51
End: 2021-08-02

## 2021-08-02 ENCOUNTER — CLINICAL SUPPORT (OUTPATIENT)
Dept: REHABILITATION | Facility: HOSPITAL | Age: 51
End: 2021-08-02
Payer: MEDICAID

## 2021-08-02 DIAGNOSIS — M53.86 DECREASED ROM OF LUMBAR SPINE: ICD-10-CM

## 2021-08-02 DIAGNOSIS — R53.1 DECREASED STRENGTH: ICD-10-CM

## 2021-08-02 PROCEDURE — 97110 THERAPEUTIC EXERCISES: CPT | Mod: PN

## 2021-08-03 ENCOUNTER — OFFICE VISIT (OUTPATIENT)
Dept: GASTROENTEROLOGY | Facility: CLINIC | Age: 51
End: 2021-08-03
Payer: MEDICAID

## 2021-08-03 VITALS
HEIGHT: 71 IN | SYSTOLIC BLOOD PRESSURE: 138 MMHG | DIASTOLIC BLOOD PRESSURE: 80 MMHG | OXYGEN SATURATION: 100 % | BODY MASS INDEX: 38.73 KG/M2 | RESPIRATION RATE: 18 BRPM | HEART RATE: 100 BPM | WEIGHT: 276.69 LBS

## 2021-08-03 DIAGNOSIS — R11.2 NON-INTRACTABLE VOMITING WITH NAUSEA, UNSPECIFIED VOMITING TYPE: Primary | ICD-10-CM

## 2021-08-03 DIAGNOSIS — K59.04 CHRONIC IDIOPATHIC CONSTIPATION: ICD-10-CM

## 2021-08-03 DIAGNOSIS — K62.89 RECTAL MASS: ICD-10-CM

## 2021-08-03 DIAGNOSIS — R10.84 GENERALIZED ABDOMINAL PAIN: ICD-10-CM

## 2021-08-03 PROCEDURE — 99203 OFFICE O/P NEW LOW 30 MIN: CPT | Mod: S$PBB,,, | Performed by: NURSE PRACTITIONER

## 2021-08-03 PROCEDURE — 99203 PR OFFICE/OUTPT VISIT, NEW, LEVL III, 30-44 MIN: ICD-10-PCS | Mod: S$PBB,,, | Performed by: NURSE PRACTITIONER

## 2021-08-03 PROCEDURE — 99215 OFFICE O/P EST HI 40 MIN: CPT | Mod: PBBFAC,PO | Performed by: NURSE PRACTITIONER

## 2021-08-03 PROCEDURE — 99999 PR PBB SHADOW E&M-EST. PATIENT-LVL V: ICD-10-PCS | Mod: PBBFAC,,, | Performed by: NURSE PRACTITIONER

## 2021-08-03 PROCEDURE — 99999 PR PBB SHADOW E&M-EST. PATIENT-LVL V: CPT | Mod: PBBFAC,,, | Performed by: NURSE PRACTITIONER

## 2021-08-03 RX ORDER — PROMETHAZINE HYDROCHLORIDE 25 MG/1
25 TABLET ORAL 3 TIMES DAILY PRN
Qty: 60 TABLET | Refills: 2 | Status: SHIPPED | OUTPATIENT
Start: 2021-08-03 | End: 2022-01-31

## 2021-08-09 ENCOUNTER — CLINICAL SUPPORT (OUTPATIENT)
Dept: REHABILITATION | Facility: HOSPITAL | Age: 51
End: 2021-08-09
Payer: MEDICAID

## 2021-08-09 DIAGNOSIS — M53.86 DECREASED ROM OF LUMBAR SPINE: ICD-10-CM

## 2021-08-09 DIAGNOSIS — R53.1 DECREASED STRENGTH: ICD-10-CM

## 2021-08-09 PROCEDURE — 97110 THERAPEUTIC EXERCISES: CPT | Mod: PN

## 2021-08-16 ENCOUNTER — TELEPHONE (OUTPATIENT)
Dept: REHABILITATION | Facility: HOSPITAL | Age: 51
End: 2021-08-16

## 2021-08-18 ENCOUNTER — CLINICAL SUPPORT (OUTPATIENT)
Dept: REHABILITATION | Facility: HOSPITAL | Age: 51
End: 2021-08-18
Payer: MEDICAID

## 2021-08-18 DIAGNOSIS — R53.1 DECREASED STRENGTH: ICD-10-CM

## 2021-08-18 DIAGNOSIS — M53.86 DECREASED ROM OF LUMBAR SPINE: ICD-10-CM

## 2021-08-18 PROCEDURE — 97110 THERAPEUTIC EXERCISES: CPT | Mod: PN | Performed by: PHYSICAL THERAPIST

## 2021-08-25 DIAGNOSIS — Z11.59 NEED FOR HEPATITIS C SCREENING TEST: ICD-10-CM

## 2021-09-04 ENCOUNTER — TELEPHONE (OUTPATIENT)
Dept: NEUROSURGERY | Facility: CLINIC | Age: 51
End: 2021-09-04

## 2021-09-10 ENCOUNTER — HOSPITAL ENCOUNTER (OUTPATIENT)
Dept: RADIOLOGY | Facility: HOSPITAL | Age: 51
Discharge: HOME | End: 2021-09-10
Attending: NURSE PRACTITIONER
Payer: MEDICAID

## 2021-09-10 ENCOUNTER — OFFICE VISIT (OUTPATIENT)
Dept: NEUROSURGERY | Facility: CLINIC | Age: 51
End: 2021-09-10
Payer: MEDICAID

## 2021-09-10 ENCOUNTER — OFFICE VISIT (OUTPATIENT)
Dept: PRIMARY CARE CLINIC | Facility: CLINIC | Age: 51
End: 2021-09-10
Payer: MEDICAID

## 2021-09-10 VITALS
HEIGHT: 71 IN | TEMPERATURE: 98 F | DIASTOLIC BLOOD PRESSURE: 91 MMHG | BODY MASS INDEX: 38.59 KG/M2 | SYSTOLIC BLOOD PRESSURE: 142 MMHG | RESPIRATION RATE: 18 BRPM | HEART RATE: 85 BPM

## 2021-09-10 DIAGNOSIS — R53.1 DECREASED STRENGTH: ICD-10-CM

## 2021-09-10 DIAGNOSIS — G89.29 CHRONIC RIGHT-SIDED LOW BACK PAIN WITH RIGHT-SIDED SCIATICA: ICD-10-CM

## 2021-09-10 DIAGNOSIS — R20.0 NUMBNESS AND TINGLING OF UPPER AND LOWER EXTREMITIES OF BOTH SIDES: ICD-10-CM

## 2021-09-10 DIAGNOSIS — E11.9 TYPE 2 DIABETES MELLITUS WITHOUT COMPLICATION, WITHOUT LONG-TERM CURRENT USE OF INSULIN: Primary | ICD-10-CM

## 2021-09-10 DIAGNOSIS — M54.41 CHRONIC RIGHT-SIDED LOW BACK PAIN WITH RIGHT-SIDED SCIATICA: Primary | ICD-10-CM

## 2021-09-10 DIAGNOSIS — R11.0 CHRONIC NAUSEA: ICD-10-CM

## 2021-09-10 DIAGNOSIS — G89.29 CHRONIC RIGHT-SIDED LOW BACK PAIN WITH RIGHT-SIDED SCIATICA: Primary | ICD-10-CM

## 2021-09-10 DIAGNOSIS — M54.41 CHRONIC RIGHT-SIDED LOW BACK PAIN WITH RIGHT-SIDED SCIATICA: ICD-10-CM

## 2021-09-10 DIAGNOSIS — R20.2 NUMBNESS AND TINGLING OF UPPER AND LOWER EXTREMITIES OF BOTH SIDES: ICD-10-CM

## 2021-09-10 DIAGNOSIS — M54.12 RADICULOPATHY, CERVICAL REGION: ICD-10-CM

## 2021-09-10 DIAGNOSIS — M54.2 CERVICALGIA: ICD-10-CM

## 2021-09-10 PROCEDURE — 99999 PR PBB SHADOW E&M-EST. PATIENT-LVL IV: CPT | Mod: PBBFAC,,, | Performed by: NURSE PRACTITIONER

## 2021-09-10 PROCEDURE — 72141 MRI NECK SPINE W/O DYE: CPT | Mod: 26,,, | Performed by: RADIOLOGY

## 2021-09-10 PROCEDURE — 99213 PR OFFICE/OUTPT VISIT, EST, LEVL III, 20-29 MIN: ICD-10-PCS | Mod: S$PBB,,, | Performed by: NURSE PRACTITIONER

## 2021-09-10 PROCEDURE — 99999 PR PBB SHADOW E&M-EST. PATIENT-LVL IV: ICD-10-PCS | Mod: PBBFAC,,, | Performed by: NURSE PRACTITIONER

## 2021-09-10 PROCEDURE — 99213 OFFICE O/P EST LOW 20 MIN: CPT | Mod: S$PBB,,, | Performed by: NURSE PRACTITIONER

## 2021-09-10 PROCEDURE — 72141 MRI CERVICAL SPINE WITHOUT CONTRAST: ICD-10-PCS | Mod: 26,,, | Performed by: RADIOLOGY

## 2021-09-10 PROCEDURE — 99214 OFFICE O/P EST MOD 30 MIN: CPT | Mod: PBBFAC | Performed by: NURSE PRACTITIONER

## 2021-09-10 PROCEDURE — 99213 OFFICE O/P EST LOW 20 MIN: CPT | Mod: 95,,, | Performed by: FAMILY MEDICINE

## 2021-09-10 PROCEDURE — 99213 PR OFFICE/OUTPT VISIT, EST, LEVL III, 20-29 MIN: ICD-10-PCS | Mod: 95,,, | Performed by: FAMILY MEDICINE

## 2021-09-10 PROCEDURE — 72141 MRI NECK SPINE W/O DYE: CPT | Mod: TC

## 2021-09-10 RX ORDER — LIRAGLUTIDE 6 MG/ML
1.8 INJECTION SUBCUTANEOUS DAILY
Qty: 27 ML | Refills: 3 | Status: SHIPPED | OUTPATIENT
Start: 2021-09-10 | End: 2022-07-18

## 2021-09-29 ENCOUNTER — DOCUMENTATION ONLY (OUTPATIENT)
Dept: REHABILITATION | Facility: HOSPITAL | Age: 51
End: 2021-09-29

## 2021-10-25 DIAGNOSIS — I10 ESSENTIAL HYPERTENSION: ICD-10-CM

## 2021-10-25 RX ORDER — POTASSIUM CHLORIDE 600 MG/1
8 TABLET, FILM COATED, EXTENDED RELEASE ORAL DAILY
Qty: 90 TABLET | Refills: 3 | Status: CANCELLED | OUTPATIENT
Start: 2021-10-25

## 2021-11-04 ENCOUNTER — OFFICE VISIT (OUTPATIENT)
Dept: NEUROSURGERY | Facility: CLINIC | Age: 51
End: 2021-11-04
Payer: MEDICAID

## 2021-11-04 VITALS
WEIGHT: 273 LBS | TEMPERATURE: 98 F | DIASTOLIC BLOOD PRESSURE: 76 MMHG | BODY MASS INDEX: 38.22 KG/M2 | HEART RATE: 75 BPM | HEIGHT: 71 IN | SYSTOLIC BLOOD PRESSURE: 112 MMHG

## 2021-11-04 DIAGNOSIS — M47.26 OSTEOARTHRITIS OF SPINE WITH RADICULOPATHY, LUMBAR REGION: ICD-10-CM

## 2021-11-04 DIAGNOSIS — G89.29 CHRONIC RIGHT-SIDED LOW BACK PAIN WITH RIGHT-SIDED SCIATICA: Primary | ICD-10-CM

## 2021-11-04 DIAGNOSIS — M54.41 CHRONIC RIGHT-SIDED LOW BACK PAIN WITH RIGHT-SIDED SCIATICA: Primary | ICD-10-CM

## 2021-11-04 DIAGNOSIS — M54.9 DORSALGIA, UNSPECIFIED: ICD-10-CM

## 2021-11-04 PROCEDURE — 99214 OFFICE O/P EST MOD 30 MIN: CPT | Mod: PBBFAC | Performed by: NEUROLOGICAL SURGERY

## 2021-11-04 PROCEDURE — 99214 OFFICE O/P EST MOD 30 MIN: CPT | Mod: S$PBB,,, | Performed by: NEUROLOGICAL SURGERY

## 2021-11-04 PROCEDURE — 99999 PR PBB SHADOW E&M-EST. PATIENT-LVL IV: ICD-10-PCS | Mod: PBBFAC,,, | Performed by: NEUROLOGICAL SURGERY

## 2021-11-04 PROCEDURE — 99999 PR PBB SHADOW E&M-EST. PATIENT-LVL IV: CPT | Mod: PBBFAC,,, | Performed by: NEUROLOGICAL SURGERY

## 2021-11-04 PROCEDURE — 99214 PR OFFICE/OUTPT VISIT, EST, LEVL IV, 30-39 MIN: ICD-10-PCS | Mod: S$PBB,,, | Performed by: NEUROLOGICAL SURGERY

## 2021-12-06 ENCOUNTER — HOSPITAL ENCOUNTER (OUTPATIENT)
Dept: RADIOLOGY | Facility: HOSPITAL | Age: 51
Discharge: HOME | End: 2021-12-06
Attending: NEUROLOGICAL SURGERY
Payer: MEDICAID

## 2021-12-06 DIAGNOSIS — M54.9 DORSALGIA, UNSPECIFIED: ICD-10-CM

## 2021-12-06 PROCEDURE — 72148 MRI LUMBAR SPINE W/O DYE: CPT | Mod: 26,,, | Performed by: RADIOLOGY

## 2021-12-06 PROCEDURE — 72148 MRI LUMBAR SPINE WITHOUT CONTRAST: ICD-10-PCS | Mod: 26,,, | Performed by: RADIOLOGY

## 2021-12-06 PROCEDURE — 72148 MRI LUMBAR SPINE W/O DYE: CPT | Mod: TC

## 2021-12-13 ENCOUNTER — TELEPHONE (OUTPATIENT)
Dept: PRIMARY CARE CLINIC | Facility: CLINIC | Age: 51
End: 2021-12-13
Payer: MEDICAID

## 2021-12-14 RX ORDER — LISINOPRIL AND HYDROCHLOROTHIAZIDE 20; 25 MG/1; MG/1
1 TABLET ORAL DAILY
Qty: 90 TABLET | Refills: 3 | Status: SHIPPED | OUTPATIENT
Start: 2021-12-14 | End: 2022-12-28

## 2021-12-28 ENCOUNTER — HOSPITAL ENCOUNTER (EMERGENCY)
Facility: HOSPITAL | Age: 51
Discharge: HOME | End: 2021-12-28
Attending: EMERGENCY MEDICINE
Payer: MEDICAID

## 2021-12-28 VITALS
HEART RATE: 104 BPM | DIASTOLIC BLOOD PRESSURE: 75 MMHG | OXYGEN SATURATION: 97 % | TEMPERATURE: 99 F | BODY MASS INDEX: 37.66 KG/M2 | SYSTOLIC BLOOD PRESSURE: 108 MMHG | WEIGHT: 270 LBS | RESPIRATION RATE: 18 BRPM

## 2021-12-28 DIAGNOSIS — E66.9 OBESITY, UNSPECIFIED CLASSIFICATION, UNSPECIFIED OBESITY TYPE, UNSPECIFIED WHETHER SERIOUS COMORBIDITY PRESENT: ICD-10-CM

## 2021-12-28 DIAGNOSIS — R51.9 ACUTE NONINTRACTABLE HEADACHE, UNSPECIFIED HEADACHE TYPE: ICD-10-CM

## 2021-12-28 DIAGNOSIS — U07.1 COVID: Primary | ICD-10-CM

## 2021-12-28 DIAGNOSIS — E11.9 TYPE 2 DIABETES MELLITUS WITHOUT COMPLICATION, UNSPECIFIED WHETHER LONG TERM INSULIN USE: ICD-10-CM

## 2021-12-28 DIAGNOSIS — B34.9 VIRAL SYNDROME: ICD-10-CM

## 2021-12-28 LAB
CTP QC/QA: YES
SARS-COV-2 RDRP RESP QL NAA+PROBE: POSITIVE

## 2021-12-28 PROCEDURE — 99283 EMERGENCY DEPT VISIT LOW MDM: CPT

## 2021-12-28 PROCEDURE — 25000003 PHARM REV CODE 250: Performed by: PHYSICIAN ASSISTANT

## 2021-12-28 PROCEDURE — 99284 PR EMERGENCY DEPT VISIT,LEVEL IV: ICD-10-PCS | Mod: CR,CS,, | Performed by: PHYSICIAN ASSISTANT

## 2021-12-28 PROCEDURE — 99284 EMERGENCY DEPT VISIT MOD MDM: CPT | Mod: CR,CS,, | Performed by: PHYSICIAN ASSISTANT

## 2021-12-28 PROCEDURE — U0002 COVID-19 LAB TEST NON-CDC: HCPCS | Performed by: EMERGENCY MEDICINE

## 2021-12-28 RX ORDER — NAPROXEN 500 MG/1
500 TABLET ORAL
Status: COMPLETED | OUTPATIENT
Start: 2021-12-28 | End: 2021-12-28

## 2021-12-28 RX ORDER — NAPROXEN 500 MG/1
500 TABLET ORAL 2 TIMES DAILY WITH MEALS
Qty: 14 TABLET | Refills: 0 | Status: SHIPPED | OUTPATIENT
Start: 2021-12-28 | End: 2023-02-08

## 2021-12-28 RX ADMIN — NAPROXEN 500 MG: 500 TABLET ORAL at 12:12

## 2021-12-28 NOTE — ED NOTES
Patient identifiers verified and correct for Haseeb Reilly   C/C: Headache, chills, back pain   APPEARANCE: awake and alert in NAD.  SKIN: warm, dry and intact. No breakdown or bruising.  MUSCULOSKELETAL: Patient moving all extremities spontaneously, no obvious swelling or deformities noted. Ambulates independently.  RESPIRATORY: Denies shortness of breath.Respirations unlabored.   CARDIAC: Denies CP, 2+ distal pulses; no peripheral edema  ABDOMEN: S/ND/NT, Denies nausea  : voids spontaneously, denies difficulty  Neurologic: AAO x 4; follows commands equal strength in all extremities; denies numbness/tingling. Denies dizziness

## 2021-12-28 NOTE — ED PROVIDER NOTES
Encounter Date: 12/28/2021       History     Chief Complaint   Patient presents with    COVID-19 Concerns     Pt reports reports back pain, cough, fever, body aches      Patient is a 51yoF who presents for viral syndrome; pertinent PMHx obesity, DM 2, HTN.  Patient reports several days of myalgias, fatigue, headache with sore throat.  Denies associated cough, CP, shortness of breath, abdominal pain, dysuria.  Patient is vaccinated.  No known COVID exposure. No Measured fever at home.   The patients available PMH, PSH, Social History, medications, allergies, and triage vital signs were reviewed just prior to their medical evaluation.  A ten point review of systems was completed and is negative except as documented above.  Patient denies any other acute medical complaint.    Please be advised this text was dictated with [employer] software and may contain errors due to translation.           Review of patient's allergies indicates:   Allergen Reactions    Morphine      Past Medical History:   Diagnosis Date    Diabetes mellitus     Hypertension      No past surgical history on file.  No family history on file.  Social History     Tobacco Use    Smoking status: Never Smoker    Smokeless tobacco: Never Used   Substance Use Topics    Alcohol use: No    Drug use: No     Review of Systems   Constitutional: Positive for fatigue. Negative for chills and fever.   HENT: Positive for sore throat. Negative for congestion and trouble swallowing.    Respiratory: Negative for cough and shortness of breath.    Cardiovascular: Negative for chest pain.   Gastrointestinal: Negative for abdominal pain, nausea and vomiting.   Genitourinary: Negative for dysuria and flank pain.   Musculoskeletal: Positive for myalgias. Negative for back pain.   Skin: Negative for rash.   Neurological: Positive for headaches. Negative for dizziness and weakness.   Hematological: Does not bruise/bleed easily.       Physical Exam     Initial Vitals  [12/28/21 1055]   BP Pulse Resp Temp SpO2   95/71 104 18 98.7 °F (37.1 °C) 97 %      MAP       --         Physical Exam    Nursing note and vitals reviewed.  Constitutional: He appears well-developed and well-nourished. He is not diaphoretic. No distress.   HENT:   Head: Normocephalic and atraumatic.   Right Ear: External ear normal.   Left Ear: External ear normal.   Mouth/Throat: Oropharyngeal exudate: deferred.   Eyes: Conjunctivae and EOM are normal. Pupils are equal, round, and reactive to light. No scleral icterus.   Neck: No JVD present.   Cardiovascular: Normal rate, regular rhythm and intact distal pulses.   Pulmonary/Chest: Breath sounds normal. No respiratory distress. He has no wheezes. He has no rhonchi. He has no rales.   Abdominal: Abdomen is soft. Bowel sounds are normal. There is no abdominal tenderness. There is no rebound and no guarding.   Musculoskeletal:         General: No edema. Normal range of motion.     Lymphadenopathy:     He has no cervical adenopathy.   Neurological: He is alert and oriented to person, place, and time.   Skin: Skin is warm and dry. Capillary refill takes less than 2 seconds.   Psychiatric: He has a normal mood and affect. His behavior is normal. Judgment and thought content normal.         ED Course   Procedures  Labs Reviewed   SARS-COV-2 RDRP GENE - Abnormal; Notable for the following components:       Result Value    POC Rapid COVID Positive (*)     All other components within normal limits    Narrative:     This test utilizes isothermal nucleic acid amplification   technology to detect the SARS-CoV-2 RdRp nucleic acid segment.   The analytical sensitivity (limit of detection) is 125 genome   equivalents/mL.   A POSITIVE result implies infection with the SARS-CoV-2 virus;   the patient is presumed to be contagious.     A NEGATIVE result means that SARS-CoV-2 nucleic acids are not   present above the limit of detection. A NEGATIVE result should be   treated as  presumptive. It does not rule out the possibility of   COVID-19 and should not be the sole basis for treatment decisions.   If COVID-19 is strongly suspected based on clinical and exposure   history, re-testing using an alternate molecular assay should be   considered.   This test is only for use under the Food and Drug   Administration s Emergency Use Authorization (EUA).   Commercial kits are provided by [employer].   Performance characteristics of the EUA have been independently   verified by [facility] Department of   Pathology and Laboratory Medicine.   _________________________________________________________________   The authorized Fact Sheet for Healthcare Providers and the authorized Fact   Sheet for Patients of the ID NOW COVID-19 are available on the FDA   website:     https://www.fda.gov/media/571825/download  https://www.fda.gov/media/715449/download                Imaging Results    None          Medications   naproxen tablet 500 mg (500 mg Oral Given 12/28/21 1201)     Medical Decision Making:   History:   Old Medical Records: I decided to obtain old medical records.  Old Records Summarized: records from clinic visits and records from previous admission(s).  Initial Assessment:   Patient presents for several days of viral URI symptoms- LTAB, VSS, afebrile    Differential Diagnosis:   Ddx includes Covid, Flu, viral syndrome. Physical exam and history taking lower clinical suspicion for PNA, sepsis, bacterial pharyngitis, bacterial sinusitis.     Clinical Tests:   Lab Tests: Ordered and Reviewed  ED Management:  Covid positive.  Ambulates without hypoxia.  I Do not suspect emergent sequela of infection.  Quarantine instructions given.  Does meet qualifications for EUA referral at this time--placed. OTC meds at home discussed. Discharged in stable condition with strict ED return precautions. Patient agreed to plan of care and voiced understanding.    Fabby Roth,  OMAYRA  12/28/2021                 ED Course as of 12/28/21 1714   Tue Dec 28, 2021   1127 SARS-CoV-2 RNA, Amplification, Qual(!): Positive [MF]      ED Course User Index  [MF] Fabby Roth PA-C             Clinical Impression:   Final diagnoses:  [B34.9] Viral syndrome  [R51.9] Acute nonintractable headache, unspecified headache type  [U07.1] COVID (Primary)  [E11.9] Type 2 diabetes mellitus without complication, unspecified whether long term insulin use  [E66.9] Obesity, unspecified classification, unspecified obesity type, unspecified whether serious comorbidity present          ED Disposition Condition    Discharge Stable        ED Prescriptions     Medication Sig Dispense Start Date End Date Auth. Provider    naproxen (NAPROSYN) 500 MG tablet Take 1 tablet (500 mg total) by mouth 2 (two) times daily with meals. 14 tablet 12/28/2021  Fabby Roth PA-C        Follow-up Information    None          Fabby Roth PA-C  12/28/21 1234

## 2021-12-28 NOTE — DISCHARGE INSTRUCTIONS
-take medication symptoms,Add 1000mg tylenol every 8 hours if needed for increased pain control.  .  Drink plenty of fluids  -return to emergency room for difficulty breathing  -quarantine at home for 5 days, or until symptoms improve, whichever is longer  -infusion center will call you to arrange

## 2021-12-28 NOTE — Clinical Note
"Haseeb "Tanya Reilly was seen and treated in our emergency department on 12/28/2021.     COVID-19 is present in our communities across the state. There is limited testing for COVID at this time, so not all patients can be tested. In this situation, your employee meets the following criteria:    Haseeb Reilly has met the criteria for COVID-19 testing and has a POSITIVE result. He can return to work once they are asymptomatic for 72 hours without the use of fever reducing medications AND at least ten days from the first positive result.     If you have any questions or concerns, or if I can be of further assistance, please do not hesitate to contact me.    Sincerely,             Fabby Roth PA-C"

## 2021-12-30 ENCOUNTER — INFUSION (OUTPATIENT)
Dept: INFECTIOUS DISEASES | Facility: HOSPITAL | Age: 51
End: 2021-12-30
Attending: INTERNAL MEDICINE
Payer: MEDICAID

## 2021-12-30 VITALS
RESPIRATION RATE: 16 BRPM | HEIGHT: 71 IN | WEIGHT: 276 LBS | HEART RATE: 88 BPM | SYSTOLIC BLOOD PRESSURE: 120 MMHG | BODY MASS INDEX: 38.64 KG/M2 | DIASTOLIC BLOOD PRESSURE: 65 MMHG | TEMPERATURE: 97 F | OXYGEN SATURATION: 96 %

## 2021-12-30 DIAGNOSIS — U07.1 COVID-19: Primary | ICD-10-CM

## 2021-12-30 DIAGNOSIS — E11.9 TYPE 2 DIABETES MELLITUS WITHOUT COMPLICATION, UNSPECIFIED WHETHER LONG TERM INSULIN USE: ICD-10-CM

## 2021-12-30 DIAGNOSIS — U07.1 COVID: ICD-10-CM

## 2021-12-30 PROCEDURE — 25000003 PHARM REV CODE 250: Performed by: INTERNAL MEDICINE

## 2021-12-30 PROCEDURE — 63600175 PHARM REV CODE 636 W HCPCS: Performed by: INTERNAL MEDICINE

## 2021-12-30 PROCEDURE — M0243 CASIRIVI AND IMDEVI INFUSION: HCPCS | Performed by: INTERNAL MEDICINE

## 2021-12-30 RX ORDER — DIPHENHYDRAMINE HYDROCHLORIDE 50 MG/ML
25 INJECTION INTRAMUSCULAR; INTRAVENOUS ONCE AS NEEDED
Status: DISCONTINUED | OUTPATIENT
Start: 2021-12-30 | End: 2022-01-21

## 2021-12-30 RX ORDER — EPINEPHRINE 0.3 MG/.3ML
0.3 INJECTION SUBCUTANEOUS
Status: DISCONTINUED | OUTPATIENT
Start: 2021-12-30 | End: 2022-01-21

## 2021-12-30 RX ORDER — SODIUM CHLORIDE 0.9 % (FLUSH) 0.9 %
10 SYRINGE (ML) INJECTION
Status: DISCONTINUED | OUTPATIENT
Start: 2021-12-30 | End: 2022-01-21

## 2021-12-30 RX ORDER — ALBUTEROL SULFATE 90 UG/1
2 AEROSOL, METERED RESPIRATORY (INHALATION)
Status: DISCONTINUED | OUTPATIENT
Start: 2021-12-30 | End: 2022-01-21

## 2021-12-30 RX ORDER — ACETAMINOPHEN 325 MG/1
650 TABLET ORAL ONCE AS NEEDED
Status: DISCONTINUED | OUTPATIENT
Start: 2021-12-30 | End: 2022-01-21

## 2021-12-30 RX ORDER — ONDANSETRON 4 MG/1
4 TABLET, ORALLY DISINTEGRATING ORAL ONCE AS NEEDED
Status: DISCONTINUED | OUTPATIENT
Start: 2021-12-30 | End: 2022-01-21

## 2021-12-30 RX ADMIN — CASIRIVIMAB AND IMDEVIMAB 600 MG: 600; 600 INJECTION, SOLUTION, CONCENTRATE INTRAVENOUS at 02:12

## 2022-01-11 ENCOUNTER — PROCEDURE VISIT (OUTPATIENT)
Dept: NEUROLOGY | Facility: CLINIC | Age: 52
End: 2022-01-11
Payer: MEDICAID

## 2022-01-11 ENCOUNTER — PES CALL (OUTPATIENT)
Dept: ADMINISTRATIVE | Facility: CLINIC | Age: 52
End: 2022-01-11
Payer: MEDICAID

## 2022-01-11 DIAGNOSIS — M54.9 DORSALGIA, UNSPECIFIED: ICD-10-CM

## 2022-01-11 DIAGNOSIS — M54.41 CHRONIC RIGHT-SIDED LOW BACK PAIN WITH RIGHT-SIDED SCIATICA: ICD-10-CM

## 2022-01-11 DIAGNOSIS — G89.29 CHRONIC RIGHT-SIDED LOW BACK PAIN WITH RIGHT-SIDED SCIATICA: ICD-10-CM

## 2022-01-11 DIAGNOSIS — M47.26 OSTEOARTHRITIS OF SPINE WITH RADICULOPATHY, LUMBAR REGION: ICD-10-CM

## 2022-01-11 PROCEDURE — 95913 PR NERVE CONDUCTION STUDY; 13 OR MORE STUDIES: ICD-10-PCS | Mod: 26,S$PBB,, | Performed by: PSYCHIATRY & NEUROLOGY

## 2022-01-11 PROCEDURE — 95886 MUSC TEST DONE W/N TEST COMP: CPT | Mod: PBBFAC | Performed by: PSYCHIATRY & NEUROLOGY

## 2022-01-11 PROCEDURE — 95913 NRV CNDJ TEST 13/> STUDIES: CPT | Mod: PBBFAC | Performed by: PSYCHIATRY & NEUROLOGY

## 2022-01-11 PROCEDURE — 95913 NRV CNDJ TEST 13/> STUDIES: CPT | Mod: 26,S$PBB,, | Performed by: PSYCHIATRY & NEUROLOGY

## 2022-01-11 PROCEDURE — 95886 PR EMG COMPLETE, W/ NERVE CONDUCTION STUDIES, 5+ MUSCLES: ICD-10-PCS | Mod: 26,S$PBB,, | Performed by: PSYCHIATRY & NEUROLOGY

## 2022-01-11 PROCEDURE — 95886 MUSC TEST DONE W/N TEST COMP: CPT | Mod: 26,S$PBB,, | Performed by: PSYCHIATRY & NEUROLOGY

## 2022-01-21 ENCOUNTER — OFFICE VISIT (OUTPATIENT)
Dept: PRIMARY CARE CLINIC | Facility: CLINIC | Age: 52
End: 2022-01-21
Payer: MEDICAID

## 2022-01-21 DIAGNOSIS — M54.41 CHRONIC RIGHT-SIDED LOW BACK PAIN WITH RIGHT-SIDED SCIATICA: ICD-10-CM

## 2022-01-21 DIAGNOSIS — E66.01 CLASS 3 SEVERE OBESITY WITH BODY MASS INDEX (BMI) OF 40.0 TO 44.9 IN ADULT, UNSPECIFIED OBESITY TYPE, UNSPECIFIED WHETHER SERIOUS COMORBIDITY PRESENT: ICD-10-CM

## 2022-01-21 DIAGNOSIS — G89.29 CHRONIC RIGHT-SIDED LOW BACK PAIN WITH RIGHT-SIDED SCIATICA: ICD-10-CM

## 2022-01-21 DIAGNOSIS — E11.9 TYPE 2 DIABETES MELLITUS WITHOUT COMPLICATION, WITHOUT LONG-TERM CURRENT USE OF INSULIN: Primary | ICD-10-CM

## 2022-01-21 PROCEDURE — 1160F RVW MEDS BY RX/DR IN RCRD: CPT | Mod: CPTII,95,, | Performed by: FAMILY MEDICINE

## 2022-01-21 PROCEDURE — 99213 OFFICE O/P EST LOW 20 MIN: CPT | Mod: 95,,, | Performed by: FAMILY MEDICINE

## 2022-01-21 PROCEDURE — 3051F PR MOST RECENT HEMOGLOBIN A1C LEVEL 7.0 - < 8.0%: ICD-10-PCS | Mod: CPTII,95,, | Performed by: FAMILY MEDICINE

## 2022-01-21 PROCEDURE — 1159F PR MEDICATION LIST DOCUMENTED IN MEDICAL RECORD: ICD-10-PCS | Mod: CPTII,95,, | Performed by: FAMILY MEDICINE

## 2022-01-21 PROCEDURE — 99213 PR OFFICE/OUTPT VISIT, EST, LEVL III, 20-29 MIN: ICD-10-PCS | Mod: 95,,, | Performed by: FAMILY MEDICINE

## 2022-01-21 PROCEDURE — 3051F HG A1C>EQUAL 7.0%<8.0%: CPT | Mod: CPTII,95,, | Performed by: FAMILY MEDICINE

## 2022-01-21 PROCEDURE — 1160F PR REVIEW ALL MEDS BY PRESCRIBER/CLIN PHARMACIST DOCUMENTED: ICD-10-PCS | Mod: CPTII,95,, | Performed by: FAMILY MEDICINE

## 2022-01-21 PROCEDURE — 1159F MED LIST DOCD IN RCRD: CPT | Mod: CPTII,95,, | Performed by: FAMILY MEDICINE

## 2022-01-21 NOTE — PROGRESS NOTES
Established Patient - Audio Only Telehealth Visit     The chief complaint leading to consultation is: f/u  Visit type: Virtual visit with audio only (telephone)  Total time spent with patient: 15       The reason for the audio only service rather than synchronous audio and video virtual visit was related to technical difficulties or patient preference/necessity.     Each patient to whom I provide medical services by telemedicine is:  (1) informed of the relationship between the physician and patient and the respective role of any other health care provider with respect to management of the patient; and (2) notified that they may decline to receive medical services by telemedicine and may withdraw from such care at any time. Patient verbally consented to receive this service via voice-only telephone call.     This service was not originating from a related E/M service provided within the previous 7 days nor will  to an E/M service or procedure within the next 24 hours or my soonest available appointment.  Prevailing standard of care was able to be met in this audio-only visit.      Clinic Note  1/21/2022      Subjective:       Patient ID:  Haseeb is a 51 y.o. male being seen for an established visit.    Chief Complaint: Follow-up     DM2 - has not gotten A1c checked recently, still taking metformin, jardiance, victoza without problems    Lower back pain - being followed by neurosurgery for this, recently had MRI lumbar spine    Obesity - having trouble with losing weight, drinks mostly water. Yesterday for breakfast ate grits, for lunch ate several pieces of [employer] chicken, did not eat dinner. Pt does not frequently eat vegetables    Review of Systems   Constitutional: Negative for chills, fever, malaise/fatigue and weight loss.   HENT: Negative for congestion, sinus pain and sore throat.    Respiratory: Negative for cough, shortness of breath and wheezing.    Cardiovascular: Negative for chest pain and  palpitations.   Gastrointestinal: Negative for constipation, diarrhea, nausea and vomiting.   Genitourinary: Negative for dysuria, frequency and urgency.   Musculoskeletal: Positive for back pain. Negative for myalgias.   Skin: Negative for rash.   Neurological: Negative for headaches.       Medication List with Changes/Refills   Current Medications    ATORVASTATIN (LIPITOR) 40 MG TABLET    Take 1 tablet (40 mg total) by mouth every evening.    BLOOD-GLUCOSE METER [facility]    Accu-Chek Guide Me Glucose Meter   CHECK BLOOD GLUCOSE TWICE DAILY FOR DIABETES    CARTIA  MG CP24    TAKE 1 CAPSULE BY MOUTH ONCE DAILY FOR HIGH BLOOD PRESSURE    CHOLECALCIFEROL, VITAMIN D3, 1,250 MCG (50,000 UNIT) CAPSULE    Take 50,000 Units by mouth once a week.    CYCLOBENZAPRINE (FLEXERIL) 10 MG TABLET    Take 1 tablet (10 mg total) by mouth 3 (three) times daily as needed for Muscle spasms. Start off taking at night first, take at dinnertime    DULOXETINE (CYMBALTA) 60 MG CAPSULE    Take 1 capsule (60 mg total) by mouth once daily.    EMPAGLIFLOZIN (JARDIANCE) 10 MG TABLET    Take 1 tablet (10 mg total) by mouth once daily. For diabetes    FAMOTIDINE (PEPCID) 40 MG TABLET    TAKE 1 TABLET BY MOUTH ONCE DAILY FOR ACID REFLUX AND HEARTBURN    FUROSEMIDE (LASIX) 40 MG TABLET    Take 1 tablet (40 mg total) by mouth once daily. Water pill    LANCETS (ACCU-CHEK FASTCLIX LANCET DRUM [facility])    Accu-Chek Fastclix Lancet Drum   USE TO CHECK GLUCOSE TWICE DAILY FOR DIABETES    LINACLOTIDE (LINZESS) 145 MCG CAP CAPSULE    Linzess 145 mcg capsule   TAKE 1 CAPSULE BY MOUTH ONCE DAILY FOR CONSTIPATION    LIRAGLUTIDE 0.6 MG/0.1 ML, 18 MG/3 ML, SUBQ PNIJ (VICTOZA 2-TIGRE) 0.6 MG/0.1 ML (18 MG/3 ML) PNIJ PEN    Inject 1.8 mg into the skin once daily.    LISINOPRIL-HYDROCHLOROTHIAZIDE (PRINZIDE,ZESTORETIC) 20-25 MG TAB    Take 1 tablet by mouth once daily. High blood pressure    METFORMIN (GLUCOPHAGE) 1000 MG TABLET    Take 1 tablet (1,000 mg total) by  "mouth 2 (two) times daily with meals.    NAPROXEN (NAPROSYN) 500 MG TABLET    Take 1 tablet (500 mg total) by mouth 2 (two) times daily with meals.    PANTOPRAZOLE (PROTONIX) 40 MG TABLET    Take 1 tablet (40 mg total) by mouth once daily.    PEN NEEDLE, DIABETIC (PEN NEEDLE) 31 GAUGE X 5/16" NDLE    1 each by Misc.(Non-Drug; Combo Route) route once daily.    PEN NEEDLE, DIABETIC 31 GAUGE X 1/4" NDLE    pen needle, diabetic 31 gauge x 1/4"   USE 1 PEN NEEDLE WITH VICTOZA ONCE DAILY    PEN NEEDLE, DIABETIC 31 GAUGE X 5/16" NDLE    BD Ultra-Fine Short Pen Needle 31 gauge x 5/16"   USE 1 PEN NEEDLE TO INJECT INSULIN ONCE DAILY.    POTASSIUM CHLORIDE (KLOR-CON) 8 MEQ TBSR    Take 1 tablet (8 mEq total) by mouth once daily.    SUCRALFATE (CARAFATE) 1 GRAM TABLET    sucralfate 1 gram tablet   TAKE 1 TABLET BY MOUTH 4 TIMES DAILY       Patient Active Problem List   Diagnosis    Chronic right-sided low back pain with right-sided sciatica    Type 2 diabetes mellitus without complication, without long-term current use of insulin    Class 3 severe obesity with body mass index (BMI) of 40.0 to 44.9 in adult    Constipation    Essential hypertension    Hyperlipidemia    Rectal mass    Osteoarthritis of spine with radiculopathy, lumbar region           Objective:      There were no vitals taken for this visit.  Estimated body mass index is 38.49 kg/m² as calculated from the following:    Height as of 12/30/21: 5' 11" (1.803 m).    Weight as of 12/30/21: 125.2 kg (276 lb).  Physical Exam  Constitutional:       Comments: Audio only   Neurological:      Mental Status: He is alert.   Psychiatric:         Mood and Affect: Mood normal.         Behavior: Behavior normal.         Thought Content: Thought content normal.         Judgment: Judgment normal.           Assessment and Plan:     1. Type 2 diabetes mellitus without complication, without long-term current use of insulin  - last Ha1c 7.1, discussed with patient to come in " at any time to get repeat HA1c. Continue metformin 1000mg bid, victoza 1.8mg qd, and jardiance 10mg qd  - Ambulatory referral/consult to Nutrition Services; Future    2. Class 3 severe obesity with body mass index (BMI) of 40.0 to 44.9 in adult, unspecified obesity type, unspecified whether serious comorbidity present  - refer patient to dietician to discuss more extensively about patient's diet    3. Chronic right-sided low back pain with right-sided sciatica  - continue f/u with neurosurgery. Recent MRI lumbar spine showing degenerative changes of the lumbar spine, mild to mod neural foraminal narrowing L3-S1      Follow Up:   No follow-ups on file.    Other Orders Placed This Visit:  Orders Placed This Encounter   Procedures    Ambulatory referral/consult to Nutrition Services         Aman Patino MD        This note is dictated on [employer] word recognition program.  There are word recognition mistakes that are occasionally missed on review.

## 2022-01-30 DIAGNOSIS — R11.2 NON-INTRACTABLE VOMITING WITH NAUSEA, UNSPECIFIED VOMITING TYPE: ICD-10-CM

## 2022-01-31 RX ORDER — PROMETHAZINE HYDROCHLORIDE 25 MG/1
TABLET ORAL
Qty: 60 TABLET | Refills: 0 | Status: SHIPPED | OUTPATIENT
Start: 2022-01-31 | End: 2023-04-13

## 2022-02-09 ENCOUNTER — TELEPHONE (OUTPATIENT)
Dept: PRIMARY CARE CLINIC | Facility: CLINIC | Age: 52
End: 2022-02-09
Payer: MEDICAID

## 2022-02-09 RX ORDER — FAMOTIDINE 40 MG/1
40 TABLET, FILM COATED ORAL NIGHTLY PRN
Qty: 90 TABLET | Refills: 3 | Status: SHIPPED | OUTPATIENT
Start: 2022-02-09 | End: 2023-03-01

## 2022-02-10 NOTE — TELEPHONE ENCOUNTER
----- Message from Kings Ledesma MA sent at 2/9/2022  2:56 PM CST -----  Contact: self [phone]    ----- Message -----  From: Ivory Fragoso  Sent: 2/9/2022   1:14 PM CST  To: [facility] Staff    Requesting an RX refill or new RX.  Is this a refill or new RX: New  RX name and strength :famotidine (PEPCID) 40 MG tablet  Is this a 30 day or 90 day RX: 30  Pharmacy name and phone #   [facility] [address] [place], [place] - [address]  [address] [facility] [address]  Phone: [phone] Fax: [phone]    The doctors have asked that we provide their patients with the following 2 reminders -- prescription refills can take up to 72 hours, and a friendly reminder that in the future you can use your [facility] account to request refills: yes

## 2022-02-18 ENCOUNTER — NUTRITION (OUTPATIENT)
Dept: NUTRITION | Facility: CLINIC | Age: 52
End: 2022-02-18
Payer: MEDICAID

## 2022-02-18 VITALS — HEIGHT: 71 IN | WEIGHT: 294.56 LBS | BODY MASS INDEX: 41.24 KG/M2

## 2022-02-18 DIAGNOSIS — Z71.3 DIETARY COUNSELING: ICD-10-CM

## 2022-02-18 DIAGNOSIS — E66.01 MORBID OBESITY WITH BMI OF 40.0-44.9, ADULT: ICD-10-CM

## 2022-02-18 DIAGNOSIS — E11.9 TYPE 2 DIABETES MELLITUS WITHOUT COMPLICATION, WITHOUT LONG-TERM CURRENT USE OF INSULIN: Primary | ICD-10-CM

## 2022-02-18 PROCEDURE — 99214 OFFICE O/P EST MOD 30 MIN: CPT | Mod: PBBFAC

## 2022-02-18 PROCEDURE — 99999 PR PBB SHADOW E&M-EST. PATIENT-LVL IV: CPT | Mod: PBBFAC,,,

## 2022-02-18 PROCEDURE — 99999 PR PBB SHADOW E&M-EST. PATIENT-LVL IV: ICD-10-PCS | Mod: PBBFAC,,,

## 2022-02-18 NOTE — PROGRESS NOTES
"Referring Physician:Aman Patino MD     Reason for visit:  Chief Complaint   Patient presents with    Diabetes    Obesity    Nutrition Counseling      Initial Visit    :1970     Allergies Reviewed  Meds Reviewed    Anthropometrics  Weight:133.6 kg (294 lb 8.6 oz)  Height:5' 11" (1.803 m)  BMI:Body mass index is 41.08 kg/m².   IBW:   78.2 kg  +/-10%    Meds:  Outpatient Medications Prior to Visit   Medication Sig Dispense Refill    atorvastatin (LIPITOR) 40 MG tablet Take 1 tablet (40 mg total) by mouth every evening. 90 tablet 3    blood-glucose meter Misc Accu-Chek Guide Me Glucose Meter   CHECK BLOOD GLUCOSE TWICE DAILY FOR DIABETES      CARTIA  mg Cp24 TAKE 1 CAPSULE BY MOUTH ONCE DAILY FOR HIGH BLOOD PRESSURE      cholecalciferol, vitamin D3, 1,250 mcg (50,000 unit) capsule Take 50,000 Units by mouth once a week.      cyclobenzaprine (FLEXERIL) 10 MG tablet Take 1 tablet (10 mg total) by mouth 3 (three) times daily as needed for Muscle spasms. Start off taking at night first, take at dinnertime 90 tablet 3    DULoxetine (CYMBALTA) 60 MG capsule Take 1 capsule (60 mg total) by mouth once daily. 90 capsule 3    empagliflozin (JARDIANCE) 10 mg tablet Take 1 tablet (10 mg total) by mouth once daily. For diabetes 90 tablet 3    famotidine (PEPCID) 40 MG tablet Take 1 tablet (40 mg total) by mouth nightly as needed for Heartburn. 90 tablet 3    furosemide (LASIX) 40 MG tablet Take 1 tablet (40 mg total) by mouth once daily. Water pill 90 tablet 3    lancets (ACCU-CHEK FASTCLIX LANCET DRUM [facility]) Accu-Chek Fastclix Lancet Drum   USE TO CHECK GLUCOSE TWICE DAILY FOR DIABETES      linaCLOtide (LINZESS) 145 mcg Cap capsule Linzess 145 mcg capsule   TAKE 1 CAPSULE BY MOUTH ONCE DAILY FOR CONSTIPATION      liraglutide 0.6 mg/0.1 mL, 18 mg/3 mL, subq PNIJ (VICTOZA 2-TIGRE) 0.6 mg/0.1 mL (18 mg/3 mL) PnIj pen Inject 1.8 mg into the skin once daily. 27 mL 3    lisinopriL-hydrochlorothiazide " "(PRINZIDE,ZESTORETIC) 20-25 mg Tab Take 1 tablet by mouth once daily. High blood pressure 90 tablet 3    metFORMIN (GLUCOPHAGE) 1000 MG tablet Take 1 tablet (1,000 mg total) by mouth 2 (two) times daily with meals. 180 tablet 3    naproxen (NAPROSYN) 500 MG tablet Take 1 tablet (500 mg total) by mouth 2 (two) times daily with meals. 14 tablet 0    pantoprazole (PROTONIX) 40 MG tablet Take 1 tablet (40 mg total) by mouth once daily. 90 tablet 3    pen needle, diabetic (PEN NEEDLE) 31 gauge x 5/16" Ndle 1 each by Misc.(Non-Drug; Combo Route) route once daily. 100 each 1    pen needle, diabetic 31 gauge x 1/4" Ndle pen needle, diabetic 31 gauge x 1/4"   USE 1 PEN NEEDLE WITH VICTOZA ONCE DAILY      pen needle, diabetic 31 gauge x 5/16" Ndle BD Ultra-Fine Short Pen Needle 31 gauge x 5/16"   USE 1 PEN NEEDLE TO INJECT INSULIN ONCE DAILY.      potassium chloride (KLOR-CON) 8 MEQ TbSR Take 1 tablet (8 mEq total) by mouth once daily. 90 tablet 3    promethazine (PHENERGAN) 25 MG tablet TAKE 1 TABLET BY MOUTH THREE TIMES DAILY AS NEEDED FOR NAUSEA 60 tablet 0    sucralfate (CARAFATE) 1 gram tablet sucralfate 1 gram tablet   TAKE 1 TABLET BY MOUTH 4 TIMES DAILY       No facility-administered medications prior to visit.       Food/Drug Interactions Noted:  n/a    Vitamins/Supplements/Herbs:  Vit D    Labs: 5/12/2021  HgbA1c  7.1  (advised pt order is in for current A1c)     Nutrition Prescription:   2346 Kcals/day( 30 kcal/kg IBW),  62 g protein( 0.8 g/kg IBW)     Support System:  Pt states he is staying with family at present, or living in his car.  Has no income, and is reliant on others for food/meals    Diet Hx:   Pt states he weighed 340 lbs in 2020, when he got hurt on his job.  Was losing weight, but then weight loss just stopped.  He states he doesn't have much of an appetite.  At present has a craving for candy and chips.  Beverages:  Water, grape juice, coffee    Breakfast: 2 cups coffee with Splenda, hot " "chocolate mix and powdered creamer.  Lunch:   No appetite; doesn't eat  Dinner: "whatever is cooked"; yesterday had 2 grilled cheese with chopped ham sandwiches, and later had birthday cake and ice cream.    Current activity level and/or physical limitations:  With his hurt back, he can't stand for too long, and cannot exercise    Motivation to make changes/anticipated barriers and/or expected adherence:   Barrier is pt's reliance on others for food    Nutrition-Focus Physical Findings:  Pt wearing face covering per COVID19 protocol; pt appears well nourished      Assessment:  Pt initially stated he thought the purpose of this visit was to address his "bad back".    Pt states he doesn't have an appetite; cannot understand why he cannot lose weight.  Diet history was scattered, and pt not receptive to review of any information.  States if he eats the healthy plate model portions of food, he would be too full.     Nutrition Diagnosis:   Obesity RT excess energy intake and physical inactivity AEB BMI > 40    Recommendations:   1800 calorie, low fat, low sodium, high fiber diet Exercise goal:  30 minutes per day, 3-5 days per week.  Pt declined education handouts    Strategies Implemented:  n/a    Consultation Time:30 minutes. N/C  Communicated with referring healthcare provider:  Consult note available in pt's Epic chart per MD discretion  Follow Up:  As needed                "

## 2022-02-18 NOTE — PATIENT INSTRUCTIONS
1800 calorie, low fat, low sodium, high fiber diet  Exercise goal:  30 minutes per day, 3-5 days per week.

## 2022-03-22 ENCOUNTER — TELEPHONE (OUTPATIENT)
Dept: NEUROSURGERY | Facility: CLINIC | Age: 52
End: 2022-03-22
Payer: MEDICAID

## 2022-03-22 ENCOUNTER — TELEPHONE (OUTPATIENT)
Dept: PRIMARY CARE CLINIC | Facility: CLINIC | Age: 52
End: 2022-03-22
Payer: MEDICAID

## 2022-03-22 DIAGNOSIS — K21.9 GASTRIC REFLUX: ICD-10-CM

## 2022-03-22 RX ORDER — PANTOPRAZOLE SODIUM 40 MG/1
40 TABLET, DELAYED RELEASE ORAL DAILY
Qty: 90 TABLET | Refills: 3 | Status: SHIPPED | OUTPATIENT
Start: 2022-03-22 | End: 2022-10-21 | Stop reason: SDUPTHER

## 2022-03-22 NOTE — TELEPHONE ENCOUNTER
Called and left message for patient that Dr. Patino needed clarification on which medication he needed a refill for.

## 2022-03-22 NOTE — TELEPHONE ENCOUNTER
----- Message from Kenzie Almonte MA sent at 3/22/2022  2:50 PM CDT -----  Contact: Patient @ [phone]    ----- Message -----  From: Shannan Pineda  Sent: 3/22/2022   2:48 PM CDT  To: [facility] Staff    Good Afternoon  Patient is calling due to Rx for stomach. Patient did not have name but would like to have it refilled please and sent to [facility] - [place], [place] - [address]    Thank you

## 2022-03-22 NOTE — TELEPHONE ENCOUNTER
Patient is on several different stomach medicines.  He is on Pepcid, Protonix, and Carafate for his acid reflux.  He is also on linaclotide/Linzess for his constipation.  Which medicine does he need a refill for?

## 2022-03-22 NOTE — TELEPHONE ENCOUNTER
----- Message from Maxx Torres sent at 3/22/2022  2:41 PM CDT -----  Contact: pt  Pt requesting a call back regarding results to test that were ran in Nov.. Pt stats he's called but never received a return call.       Confirmed contact info below:  Contact Name: Haseeb Reilly  Phone Number: [phone]

## 2022-04-05 ENCOUNTER — TELEPHONE (OUTPATIENT)
Dept: PRIMARY CARE CLINIC | Facility: CLINIC | Age: 52
End: 2022-04-05
Payer: MEDICAID

## 2022-04-05 NOTE — TELEPHONE ENCOUNTER
"----- Message from Kings Ledesma MA sent at 4/5/2022  2:34 PM CDT -----  Contact: [phone]    ----- Message -----  From: Madelyn Lopez  Sent: 4/5/2022   1:11 PM CDT  To: [facility] Staff    Requesting an RX refill or new RX.  Is this a refill or new RX: refill  RX name and strength (copy/paste from chart):  cyclobenzaprine (FLEXERIL) 10 MG tabl  Is this a 30 day or 90 day RX: 30 day  Pharmacy name and phone # (copy/paste from chart):    [facility] [address] [place] [address] - [address]  [address]  [facility] [address]  Phone: [phone] Fax: [phone]  The doctors have asked that we provide their patients with the following 2 reminders -- prescription refills can take up to 72 hours, and a friendly reminder that in the future you can use your [facility] account to request refills: aware       Requesting an RX refill or new RX.  Is this a refill or new RX: refill  RX name and strength (copy/paste from chart):  pen needle, diabetic 31 gauge x 1/4" Ndle  Is this a 30 day or 90 day RX: 90 day  Pharmacy name and phone # (copy/paste from chart):    [facility] [address] [place] [address] - [address]  [address]  [place] [address]  Phone: [phone] Fax: [phone]  The doctors have asked that we provide their patients with the following 2 reminders -- prescription refills can take up to 72 hours, and a friendly reminder that in the future you can use your [facility] account to request refills:               "

## 2022-04-06 DIAGNOSIS — E11.9 TYPE 2 DIABETES MELLITUS WITHOUT COMPLICATION, WITHOUT LONG-TERM CURRENT USE OF INSULIN: ICD-10-CM

## 2022-04-06 DIAGNOSIS — G89.29 CHRONIC RIGHT-SIDED LOW BACK PAIN WITH RIGHT-SIDED SCIATICA: ICD-10-CM

## 2022-04-06 DIAGNOSIS — M54.41 CHRONIC RIGHT-SIDED LOW BACK PAIN WITH RIGHT-SIDED SCIATICA: ICD-10-CM

## 2022-04-07 RX ORDER — CYCLOBENZAPRINE HCL 10 MG
TABLET ORAL
Qty: 90 TABLET | Refills: 0 | Status: SHIPPED | OUTPATIENT
Start: 2022-04-07 | End: 2022-07-14 | Stop reason: SDUPTHER

## 2022-04-07 RX ORDER — PEN NEEDLE, DIABETIC 30 GX3/16"
NEEDLE, DISPOSABLE MISCELLANEOUS
Qty: 100 EACH | Refills: 0 | Status: SHIPPED | OUTPATIENT
Start: 2022-04-07

## 2022-04-11 ENCOUNTER — OFFICE VISIT (OUTPATIENT)
Dept: NEUROSURGERY | Facility: CLINIC | Age: 52
End: 2022-04-11
Payer: MEDICAID

## 2022-04-11 VITALS
BODY MASS INDEX: 38.22 KG/M2 | WEIGHT: 273 LBS | TEMPERATURE: 98 F | HEIGHT: 71 IN | HEART RATE: 110 BPM | SYSTOLIC BLOOD PRESSURE: 127 MMHG | DIASTOLIC BLOOD PRESSURE: 88 MMHG

## 2022-04-11 DIAGNOSIS — M47.26 OSTEOARTHRITIS OF SPINE WITH RADICULOPATHY, LUMBAR REGION: Primary | ICD-10-CM

## 2022-04-11 DIAGNOSIS — G62.9 POLYNEUROPATHY: ICD-10-CM

## 2022-04-11 PROCEDURE — 99999 PR PBB SHADOW E&M-EST. PATIENT-LVL IV: CPT | Mod: PBBFAC,,, | Performed by: NEUROLOGICAL SURGERY

## 2022-04-11 PROCEDURE — 3074F PR MOST RECENT SYSTOLIC BLOOD PRESSURE < 130 MM HG: ICD-10-PCS | Mod: CPTII,,, | Performed by: NEUROLOGICAL SURGERY

## 2022-04-11 PROCEDURE — 1160F RVW MEDS BY RX/DR IN RCRD: CPT | Mod: CPTII,,, | Performed by: NEUROLOGICAL SURGERY

## 2022-04-11 PROCEDURE — 3074F SYST BP LT 130 MM HG: CPT | Mod: CPTII,,, | Performed by: NEUROLOGICAL SURGERY

## 2022-04-11 PROCEDURE — 4010F ACE/ARB THERAPY RXD/TAKEN: CPT | Mod: CPTII,,, | Performed by: NEUROLOGICAL SURGERY

## 2022-04-11 PROCEDURE — 99214 OFFICE O/P EST MOD 30 MIN: CPT | Mod: PBBFAC | Performed by: NEUROLOGICAL SURGERY

## 2022-04-11 PROCEDURE — 3008F PR BODY MASS INDEX (BMI) DOCUMENTED: ICD-10-PCS | Mod: CPTII,,, | Performed by: NEUROLOGICAL SURGERY

## 2022-04-11 PROCEDURE — 3008F BODY MASS INDEX DOCD: CPT | Mod: CPTII,,, | Performed by: NEUROLOGICAL SURGERY

## 2022-04-11 PROCEDURE — 99213 OFFICE O/P EST LOW 20 MIN: CPT | Mod: S$PBB,,, | Performed by: NEUROLOGICAL SURGERY

## 2022-04-11 PROCEDURE — 1159F MED LIST DOCD IN RCRD: CPT | Mod: CPTII,,, | Performed by: NEUROLOGICAL SURGERY

## 2022-04-11 PROCEDURE — 3079F PR MOST RECENT DIASTOLIC BLOOD PRESSURE 80-89 MM HG: ICD-10-PCS | Mod: CPTII,,, | Performed by: NEUROLOGICAL SURGERY

## 2022-04-11 PROCEDURE — 99213 PR OFFICE/OUTPT VISIT, EST, LEVL III, 20-29 MIN: ICD-10-PCS | Mod: S$PBB,,, | Performed by: NEUROLOGICAL SURGERY

## 2022-04-11 PROCEDURE — 1159F PR MEDICATION LIST DOCUMENTED IN MEDICAL RECORD: ICD-10-PCS | Mod: CPTII,,, | Performed by: NEUROLOGICAL SURGERY

## 2022-04-11 PROCEDURE — 1160F PR REVIEW ALL MEDS BY PRESCRIBER/CLIN PHARMACIST DOCUMENTED: ICD-10-PCS | Mod: CPTII,,, | Performed by: NEUROLOGICAL SURGERY

## 2022-04-11 PROCEDURE — 4010F PR ACE/ARB THEARPY RXD/TAKEN: ICD-10-PCS | Mod: CPTII,,, | Performed by: NEUROLOGICAL SURGERY

## 2022-04-11 PROCEDURE — 3079F DIAST BP 80-89 MM HG: CPT | Mod: CPTII,,, | Performed by: NEUROLOGICAL SURGERY

## 2022-04-11 PROCEDURE — 99999 PR PBB SHADOW E&M-EST. PATIENT-LVL IV: ICD-10-PCS | Mod: PBBFAC,,, | Performed by: NEUROLOGICAL SURGERY

## 2022-04-12 ENCOUNTER — TELEPHONE (OUTPATIENT)
Dept: NEUROLOGY | Facility: CLINIC | Age: 52
End: 2022-04-12
Payer: MEDICAID

## 2022-04-12 DIAGNOSIS — Z12.11 COLON CANCER SCREENING: Primary | ICD-10-CM

## 2022-04-12 NOTE — TELEPHONE ENCOUNTER
----- Message from Mavis Painting sent at 4/12/2022 11:04 AM CDT -----  Contact: self @ [phone]  Pt says he is returning your call concerning an appt.  Pls call.

## 2022-04-12 NOTE — TELEPHONE ENCOUNTER
----- Message from Kathy Martinez MA sent at 4/11/2022  3:02 PM CDT -----  Good afternoon,    Dr. Pappas is referring this patient to Dr. Samson for Polyneuropathy. Please call patient to schedule an appointment.     Thanks

## 2022-04-12 NOTE — PROGRESS NOTES
Neurosurgery  Established Patient    SUBJECTIVE:     History of Present Illness:  Mr. Reilly is a 51-year-old male who is seeing me today in follow-up.  His last neurosurgery clinic appointment was on November 4, 2021. He complains of a chronic history of neck pain and back pain.  Approximately 1 and half years ago he fell off his truck at work.  Since that time, he complains of chronic low back pain.  This pain goes across his lower back.  He also reports weakness and difficulty walking secondary to pain.  Initially, he reports left leg pain.  He also complained of neck pain and bilateral arm paresthesias.  Physical therapy and epidural steroid injections did not help his pain or paresthesias.  At the time of his last clinic appointment, his neck pain was actually reasonably tolerable.  However, he complained of bilateral numbness of the entirety of his arms into the entirety of his hands which was getting progressively worse.  This was worse when he was sleeping at night.  He also complained of right-sided low back pain with radiation into his right hip and into his right leg.  He denied any significant or constant pain in his left leg.  I did not feel that his arm numbness was due to any cervical spine findings as an MRI of the cervical spine failed to show any significant central canal stenosis or neural foraminal narrowing throughout the cervical spine.  Therefore, I had sent the patient for an EMG of the upper extremities.  I also sent the patient for an updated MRI of lumbar spine as his last MRI was a year ago.  He is here today to see me in follow-up.  Currently, he continues to complain of severe bilateral upper extremity paresthesias.  These are worsening and are now present during the day in addition to at night.  They seem to be present constantly.  The paresthesias involve the entirety of his arms into the entirety of his hands bilaterally.  He continues to complain of right-sided low back pain,  right hip pain, and right leg pain which is worse with any type of activity.    Review of patient's allergies indicates:   Allergen Reactions    Morphine        Current Outpatient Medications   Medication Sig Dispense Refill    atorvastatin (LIPITOR) 40 MG tablet Take 1 tablet (40 mg total) by mouth every evening. 90 tablet 3    blood-glucose meter Misc Accu-Chek Guide Me Glucose Meter   CHECK BLOOD GLUCOSE TWICE DAILY FOR DIABETES      CARTIA  mg Cp24 TAKE 1 CAPSULE BY MOUTH ONCE DAILY FOR HIGH BLOOD PRESSURE      cholecalciferol, vitamin D3, 1,250 mcg (50,000 unit) capsule Take 50,000 Units by mouth once a week.      cyclobenzaprine (FLEXERIL) 10 MG tablet TAKE 1 TABLET BY MOUTH THREE TIMES DAILY AS NEEDED FOR MUSCLE SPASM .  START  OFF  TAKING  AT  NIGHT  FIRST,  THEN  TAKE  AT  DINNERTIME. 90 tablet 0    DULoxetine (CYMBALTA) 60 MG capsule Take 1 capsule (60 mg total) by mouth once daily. 90 capsule 3    empagliflozin (JARDIANCE) 10 mg tablet Take 1 tablet (10 mg total) by mouth once daily. For diabetes 90 tablet 3    famotidine (PEPCID) 40 MG tablet Take 1 tablet (40 mg total) by mouth nightly as needed for Heartburn. 90 tablet 3    furosemide (LASIX) 40 MG tablet Take 1 tablet (40 mg total) by mouth once daily. Water pill 90 tablet 3    lancets (ACCU-CHEK FASTCLIX LANCET DRUM [facility] [place] [facility]) Accu-Chek Fastclix Lancet Drum   USE TO CHECK GLUCOSE TWICE DAILY FOR DIABETES      linaCLOtide (LINZESS) 145 mcg Cap capsule Linzess 145 mcg capsule   TAKE 1 CAPSULE BY MOUTH ONCE DAILY FOR CONSTIPATION      liraglutide 0.6 mg/0.1 mL, 18 mg/3 mL, subq PNIJ (VICTOZA 2-TIGRE) 0.6 mg/0.1 mL (18 mg/3 mL) PnIj pen Inject 1.8 mg into the skin once daily. 27 mL 3    lisinopriL-hydrochlorothiazide (PRINZIDE,ZESTORETIC) 20-25 mg Tab Take 1 tablet by mouth once daily. High blood pressure 90 tablet 3    metFORMIN (GLUCOPHAGE) 1000 MG tablet Take 1 tablet (1,000 mg total) by mouth 2 (two) times daily with meals. 180 tablet 3  "   naproxen (NAPROSYN) 500 MG tablet Take 1 tablet (500 mg total) by mouth 2 (two) times daily with meals. 14 tablet 0    pantoprazole (PROTONIX) 40 MG tablet Take 1 tablet (40 mg total) by mouth once daily. heartburn 90 tablet 3    pen needle, diabetic (BD ULTRA-FINE SHORT PEN NEEDLE) 31 gauge x 5/16" Ndle USE 1 PEN NEEDLE TO INJECT INSULIN ONCE DAILY. 100 each 0    pen needle, diabetic 31 gauge x 1/4" Ndle pen needle, diabetic 31 gauge x 1/4"   USE 1 PEN NEEDLE WITH VICTOZA ONCE DAILY      pen needle, diabetic 31 gauge x 5/16" Ndle BD Ultra-Fine Short Pen Needle 31 gauge x 5/16"   USE 1 PEN NEEDLE TO INJECT INSULIN ONCE DAILY.      potassium chloride (KLOR-CON) 8 MEQ TbSR Take 1 tablet (8 mEq total) by mouth once daily. 90 tablet 3    promethazine (PHENERGAN) 25 MG tablet TAKE 1 TABLET BY MOUTH THREE TIMES DAILY AS NEEDED FOR NAUSEA 60 tablet 0    sucralfate (CARAFATE) 1 gram tablet sucralfate 1 gram tablet   TAKE 1 TABLET BY MOUTH 4 TIMES DAILY       No current facility-administered medications for this visit.       Past Medical History:   Diagnosis Date    Diabetes mellitus     Hypertension      History reviewed. No pertinent surgical history.  Family History    None       Social History     Socioeconomic History    Marital status:    Tobacco Use    Smoking status: Never Smoker    Smokeless tobacco: Never Used   Substance and Sexual Activity    Alcohol use: No    Drug use: No       Review of Systems    OBJECTIVE:     Vital Signs  Temp: 98.2 °F (36.8 °C)  Pulse: 110  BP: 127/88  Pain Score:   7  Height: 5' 11" (180.3 cm)  Weight: 123.8 kg (273 lb)  Body mass index is 38.08 kg/m².    Physical Exam:  Vitals reviewed.    Constitutional: He appears well-developed and well-nourished. No distress.     Eyes: Pupils are equal, round, and reactive to light. Conjunctivae and EOM are normal.     Cardiovascular: Normal rate, regular rhythm, normal pulses and no edema.     Abdominal: Soft. Bowel sounds " are normal.     Skin: Skin displays no rash on trunk and no rash on extremities. Skin displays no lesions on trunk and no lesions on extremities.     Psych/Behavior: He is alert. He is oriented to person, place, and time. He has a normal mood and affect.     Musculoskeletal: Gait is normal.        Neck: Range of motion is full. There is no tenderness. Muscle strength is 5/5. Tone is normal.        Back: Range of motion is full. There is no tenderness. Muscle strength is 5/5. Tone is normal.        Right Upper Extremities: Range of motion is full. There is no tenderness. Muscle strength is 5/5. Tone is normal.        Left Upper Extremities: Range of motion is full. There is no tenderness. Muscle strength is 5/5. Tone is normal.       Right Lower Extremities: Range of motion is full. There is no tenderness. Muscle strength is 5/5. Tone is normal.        Left Lower Extremities: Range of motion is full. There is no tenderness. Muscle strength is 5/5. Tone is normal.     Neurological:        Coordination: He has a normal Romberg Test, normal finger to nose coordination and normal tandem walking coordination.        Sensory: There is no sensory deficit in the trunk. There is no sensory deficit in the extremities.        DTRs: DTRs are DTRS NORMAL AND SYMMETRICnormal and symmetric. He displays no Babinski's sign on the right side. He displays no Babinski's sign on the left side.        Cranial nerves: Cranial nerve(s) II, III, IV, V, VI, VII, VIII, IX, X, XI and XII are intact.         Diagnostic Results:  He has an EMG of the upper and lower extremities available for review which I personally reviewed.  This shows a severe sensory polyneuropathy in bilateral upper extremities and in the right lower extremity.    He has an updated MRI of the lumbar spine available for review which I personally reviewed.  This shows normal alignment of the lumbar spine there are broad-based disc bulges most pronounced at L4-5 and L5-S1.   There is mild-to-moderate right greater than left neural foraminal narrowing at the aforementioned levels.    ASSESSMENT/PLAN:     Mr. Reilly is a 51-year-old male who complains of bilateral arm and hand paresthesias as described above.  He also complains of low back pain and right lower extremity radiating pain.  His EMG of the upper extremities shows severe sensory polyneuropathy.  In order to further workup this EMG finding, I would like the patient to be seen by Dr. Sudheer Crum of Neurology for evaluation and treatment.  Again, I do not believe this is related to his cervical spine as there are no significant finding on an MRI of the cervical spine.  From a back pain standpoint and MRI of the lumbar spine standpoint, he does have some neural foraminal narrowing on the right at L4-5 and L5-S1 but his symptoms seem to be out of proportion to his radiographic findings.  There were findings of a sensory polyneuropathy in his right lower extremity as well.  Therefore, I would like Dr. Crum to complete his workup before pursuing any further treatment options from a neurosurgical standpoint.  I will see the patient back once he has been evaluated by Dr. Crum.  He knows he can call with any further questions or concerns in the meantime.        Note dictated with voice recognition software, please excuse any grammatical errors.

## 2022-04-12 NOTE — TELEPHONE ENCOUNTER
Called pt to schedule neurology appt. Left vm with clinic number to return call to schedule   no history of blood product transfusion

## 2022-04-26 ENCOUNTER — TELEPHONE (OUTPATIENT)
Dept: ENDOSCOPY | Facility: HOSPITAL | Age: 52
End: 2022-04-26
Payer: MEDICAID

## 2022-05-03 NOTE — TELEPHONE ENCOUNTER
----- Message from Ivory Fragoso sent at 5/3/2022  9:13 AM CDT -----  Contact: self [phone]    Requesting an RX refill or new RX.  Is this a refill or new RX: NEW  RX name and strength:linaCLOtide (LINZESS) 145 mcg Cap capsule  Is this a 30 day or 90 day RX: 30  Pharmacy name and phone #   [facility] - [place], [place] - [address]  [address] [facility] [address]  Phone: [phone] Fax: [phone]    The doctors have asked that we provide their patients with the following 2 reminders -- prescription refills can take up to 72 hours, and a friendly reminder that in the future you can use your [facility] account to request refills: yes

## 2022-05-14 DIAGNOSIS — I10 ESSENTIAL HYPERTENSION: ICD-10-CM

## 2022-05-16 DIAGNOSIS — I10 ESSENTIAL HYPERTENSION: ICD-10-CM

## 2022-05-16 RX ORDER — FUROSEMIDE 40 MG/1
TABLET ORAL
Qty: 30 TABLET | Refills: 0 | OUTPATIENT
Start: 2022-05-16

## 2022-05-16 RX ORDER — FUROSEMIDE 40 MG/1
40 TABLET ORAL DAILY
Qty: 30 TABLET | Refills: 0 | Status: SHIPPED | OUTPATIENT
Start: 2022-05-16 | End: 2022-05-16 | Stop reason: SDUPTHER

## 2022-05-16 RX ORDER — FUROSEMIDE 40 MG/1
40 TABLET ORAL DAILY
Qty: 30 TABLET | Refills: 0 | Status: SHIPPED | OUTPATIENT
Start: 2022-05-16 | End: 2022-06-28

## 2022-05-16 NOTE — TELEPHONE ENCOUNTER
----- Message from Susie Curtis sent at 5/16/2022  3:25 PM CDT -----  Contact: Arsalan sampson [facility] pharmacy phone [phone]  Requesting an RX refill or new RX.  Is this a refill or new RX: Refill  RX name and strength (copy/paste from chart):  furosemide (LASIX) 40 MG tablet  Is this a 30 day or 90 day RX:   Pharmacy name and phone # (copy/paste from chart):    [facility] [address] [place], [place] - [address]  [address] [facility] [address]  Phone: [phone] Fax: [phone]  The doctors have asked that we provide their patients with the following 2 reminders -- prescription refills can take up to 72 hours, and a friendly reminder that in the future you can use your [facility] account to request refills: N/A

## 2022-05-23 ENCOUNTER — TELEPHONE (OUTPATIENT)
Dept: NEUROLOGY | Facility: CLINIC | Age: 52
End: 2022-05-23
Payer: MEDICAID

## 2022-05-23 NOTE — TELEPHONE ENCOUNTER
"Pt called and stated he is in severe back pain. He stated he is on his way to the clinic and is demanding to be seen. He needs a "shot or something". Explained have no available appts at this time and his appt is not until June 30th. Pt stated he knows when his appt is but something needs to be done. I explained he needs to go to ED or urgent care. He said is going to go see the doctor on 8th floor (Dr. Pappas). I explained again without an appt he can not be seen. Offered ED or urgent care. Pt went off on a rant about his visit with  being a waste of time to him and money to the ins company. Every time he sees someone they say there is nothing that can be done. He was suppose to have surgery but then was told no he doesn't need surgery. He is very angry and upset. Apologized to pt but explained we do not keep routine pain meds in clinics and he really should go to ED or urgent care. Pt disconnected call.   "

## 2022-05-24 ENCOUNTER — TELEPHONE (OUTPATIENT)
Dept: PRIMARY CARE CLINIC | Facility: CLINIC | Age: 52
End: 2022-05-24
Payer: MEDICAID

## 2022-05-24 NOTE — TELEPHONE ENCOUNTER
----- Message from Ivory Fragoso sent at 5/24/2022  3:20 PM CDT -----  Contact: pt   Requesting an RX refill or new RX.  Is this a refill or new RX: NEW  RX name and strength:empagliflozin (JARDIANCE) 10 mg tablet  Is this a 30 day or 90 day RX: 90   Pharmacy name and phone #  [facility] - [place], [place] - [address] [facility]  [address] [facility] [address]  Phone: [phone] Fax: [phone]    The doctors have asked that we provide their patients with the following 2 reminders -- prescription refills can take up to 72 hours, and a friendly reminder that in the future you can use your [facility] account to request refills: yes      Patient has called several times and has not rcvd a response. Please advise

## 2022-05-25 ENCOUNTER — TELEPHONE (OUTPATIENT)
Dept: PRIMARY CARE CLINIC | Facility: CLINIC | Age: 52
End: 2022-05-25
Payer: MEDICAID

## 2022-05-25 DIAGNOSIS — E11.9 TYPE 2 DIABETES MELLITUS WITHOUT COMPLICATION, WITHOUT LONG-TERM CURRENT USE OF INSULIN: Primary | ICD-10-CM

## 2022-05-25 DIAGNOSIS — E78.5 HYPERLIPIDEMIA, UNSPECIFIED HYPERLIPIDEMIA TYPE: ICD-10-CM

## 2022-05-25 DIAGNOSIS — Z11.59 ENCOUNTER FOR HEPATITIS C SCREENING TEST FOR LOW RISK PATIENT: ICD-10-CM

## 2022-05-25 RX ORDER — ATORVASTATIN CALCIUM 40 MG/1
40 TABLET, FILM COATED ORAL NIGHTLY
Qty: 90 TABLET | Refills: 3 | Status: SHIPPED | OUTPATIENT
Start: 2022-05-25 | End: 2023-06-07

## 2022-05-25 NOTE — PROGRESS NOTES
Please let patient know that I have sent a refill of his Jardiance.  However he needs to get his diabetes labs done.  I have already placed order, he come to our clinic or any [facility] facility to get this done

## 2022-05-25 NOTE — TELEPHONE ENCOUNTER
"Called patient advised him of physician"s reccommendatins. Patient states he will come in on Friday for labs.          "

## 2022-05-25 NOTE — TELEPHONE ENCOUNTER
----- Message from Kings Ledesma MA sent at 5/23/2022  2:24 PM CDT -----  Contact: Cam [phone]    ----- Message -----  From: Estefanía Morse  Sent: 5/23/2022  11:38 AM CDT  To: [facility] Staff    Requesting an RX refill or new RX.  Is this a refill or new RX: refill  RX name and strength (copy/paste from chart):  empagliflozin (JARDIANCE) 10 mg tablet  Is this a 30 day or 90 day RX: 90  Pharmacy name and phone # (copy/paste from chart):    [facility] [address] [place] [address] [email or web address]  [address] [facility] [address]  Phone: [phone] Fax: [phone]    Requesting an RX refill or new RX.  Is this a refill or new RX: Refill  RX name and strength (copy/paste from chart):  atorvastatin (LIPITOR) 40 MG tablet  Is this a 30 day or 90 day RX: 90  Pharmacy name and phone # (copy/paste from chart):    [facility] [address] [place] [address] [address]  [address] [facility] [address]  Phone: [phone] Fax: [phone]    Please call and advise.    Thank You

## 2022-05-27 ENCOUNTER — LAB VISIT (OUTPATIENT)
Dept: PRIMARY CARE CLINIC | Facility: CLINIC | Age: 52
End: 2022-05-27
Payer: MEDICAID

## 2022-05-27 DIAGNOSIS — E11.9 TYPE 2 DIABETES MELLITUS WITHOUT COMPLICATION, WITHOUT LONG-TERM CURRENT USE OF INSULIN: ICD-10-CM

## 2022-05-27 DIAGNOSIS — Z11.59 ENCOUNTER FOR HEPATITIS C SCREENING TEST FOR LOW RISK PATIENT: ICD-10-CM

## 2022-05-27 LAB
ANION GAP SERPL CALC-SCNC: 13 MMOL/L (ref 8–16)
BUN SERPL-MCNC: 11 MG/DL (ref 6–20)
CALCIUM SERPL-MCNC: 9.6 MG/DL (ref 8.7–10.5)
CHLORIDE SERPL-SCNC: 99 MMOL/L (ref 95–110)
CO2 SERPL-SCNC: 25 MMOL/L (ref 23–29)
CREAT SERPL-MCNC: 1 MG/DL (ref 0.5–1.4)
EST. GFR  (AFRICAN AMERICAN): >60 ML/MIN/1.73 M^2
EST. GFR  (NON AFRICAN AMERICAN): >60 ML/MIN/1.73 M^2
ESTIMATED AVG GLUCOSE: 148 MG/DL (ref 68–131)
GLUCOSE SERPL-MCNC: 92 MG/DL (ref 70–110)
HBA1C MFR BLD: 6.8 % (ref 4–5.6)
POTASSIUM SERPL-SCNC: 3.6 MMOL/L (ref 3.5–5.1)
SODIUM SERPL-SCNC: 137 MMOL/L (ref 136–145)

## 2022-05-27 PROCEDURE — 86803 HEPATITIS C AB TEST: CPT | Performed by: FAMILY MEDICINE

## 2022-05-27 PROCEDURE — 80048 BASIC METABOLIC PNL TOTAL CA: CPT | Performed by: FAMILY MEDICINE

## 2022-05-27 PROCEDURE — 83036 HEMOGLOBIN GLYCOSYLATED A1C: CPT | Performed by: FAMILY MEDICINE

## 2022-05-27 PROCEDURE — 36415 COLL VENOUS BLD VENIPUNCTURE: CPT | Mod: PBBFAC,PN

## 2022-05-30 ENCOUNTER — TELEPHONE (OUTPATIENT)
Dept: PRIMARY CARE CLINIC | Facility: CLINIC | Age: 52
End: 2022-05-30
Payer: MEDICAID

## 2022-05-30 NOTE — TELEPHONE ENCOUNTER
----- Message from Aman Patino MD sent at 5/30/2022  4:48 PM CDT -----  Please let patient know that his diabetes levels look good. Continue making dietary changes and taking his medications. His kidney function and electrolytes look good.

## 2022-05-31 LAB — HCV AB SERPL QL IA: NEGATIVE

## 2022-06-24 ENCOUNTER — TELEPHONE (OUTPATIENT)
Dept: PRIMARY CARE CLINIC | Facility: CLINIC | Age: 52
End: 2022-06-24
Payer: MEDICAID

## 2022-06-24 NOTE — TELEPHONE ENCOUNTER
----- Message from Ivory Fragoso sent at 6/24/2022  3:16 PM CDT -----  Contact: pt [phone]  Patient is requesting a medication list be faxed to an outside provider, he does not have the name of the physician.    Fax

## 2022-06-30 ENCOUNTER — OFFICE VISIT (OUTPATIENT)
Dept: NEUROLOGY | Facility: CLINIC | Age: 52
End: 2022-06-30
Payer: MEDICAID

## 2022-06-30 VITALS
HEIGHT: 71 IN | BODY MASS INDEX: 40.66 KG/M2 | DIASTOLIC BLOOD PRESSURE: 91 MMHG | HEART RATE: 90 BPM | WEIGHT: 290.44 LBS | SYSTOLIC BLOOD PRESSURE: 129 MMHG

## 2022-06-30 DIAGNOSIS — I10 ESSENTIAL HYPERTENSION: ICD-10-CM

## 2022-06-30 DIAGNOSIS — E11.9 TYPE 2 DIABETES MELLITUS WITHOUT COMPLICATION, WITHOUT LONG-TERM CURRENT USE OF INSULIN: ICD-10-CM

## 2022-06-30 DIAGNOSIS — M47.26 OSTEOARTHRITIS OF SPINE WITH RADICULOPATHY, LUMBAR REGION: ICD-10-CM

## 2022-06-30 DIAGNOSIS — E78.5 HYPERLIPIDEMIA, UNSPECIFIED HYPERLIPIDEMIA TYPE: ICD-10-CM

## 2022-06-30 DIAGNOSIS — G62.9 POLYNEUROPATHY: Primary | ICD-10-CM

## 2022-06-30 PROCEDURE — 3044F HG A1C LEVEL LT 7.0%: CPT | Mod: CPTII,,, | Performed by: PSYCHIATRY & NEUROLOGY

## 2022-06-30 PROCEDURE — 3008F BODY MASS INDEX DOCD: CPT | Mod: CPTII,,, | Performed by: PSYCHIATRY & NEUROLOGY

## 2022-06-30 PROCEDURE — 4010F PR ACE/ARB THEARPY RXD/TAKEN: ICD-10-PCS | Mod: CPTII,,, | Performed by: PSYCHIATRY & NEUROLOGY

## 2022-06-30 PROCEDURE — 4010F ACE/ARB THERAPY RXD/TAKEN: CPT | Mod: CPTII,,, | Performed by: PSYCHIATRY & NEUROLOGY

## 2022-06-30 PROCEDURE — 3080F DIAST BP >= 90 MM HG: CPT | Mod: CPTII,,, | Performed by: PSYCHIATRY & NEUROLOGY

## 2022-06-30 PROCEDURE — 99214 OFFICE O/P EST MOD 30 MIN: CPT | Mod: PBBFAC | Performed by: PSYCHIATRY & NEUROLOGY

## 2022-06-30 PROCEDURE — 3044F PR MOST RECENT HEMOGLOBIN A1C LEVEL <7.0%: ICD-10-PCS | Mod: CPTII,,, | Performed by: PSYCHIATRY & NEUROLOGY

## 2022-06-30 PROCEDURE — 3008F PR BODY MASS INDEX (BMI) DOCUMENTED: ICD-10-PCS | Mod: CPTII,,, | Performed by: PSYCHIATRY & NEUROLOGY

## 2022-06-30 PROCEDURE — 3080F PR MOST RECENT DIASTOLIC BLOOD PRESSURE >= 90 MM HG: ICD-10-PCS | Mod: CPTII,,, | Performed by: PSYCHIATRY & NEUROLOGY

## 2022-06-30 PROCEDURE — 3074F PR MOST RECENT SYSTOLIC BLOOD PRESSURE < 130 MM HG: ICD-10-PCS | Mod: CPTII,,, | Performed by: PSYCHIATRY & NEUROLOGY

## 2022-06-30 PROCEDURE — 1160F RVW MEDS BY RX/DR IN RCRD: CPT | Mod: CPTII,,, | Performed by: PSYCHIATRY & NEUROLOGY

## 2022-06-30 PROCEDURE — 99999 PR PBB SHADOW E&M-EST. PATIENT-LVL IV: ICD-10-PCS | Mod: PBBFAC,,, | Performed by: PSYCHIATRY & NEUROLOGY

## 2022-06-30 PROCEDURE — 99204 PR OFFICE/OUTPT VISIT, NEW, LEVL IV, 45-59 MIN: ICD-10-PCS | Mod: S$PBB,,, | Performed by: PSYCHIATRY & NEUROLOGY

## 2022-06-30 PROCEDURE — 99204 OFFICE O/P NEW MOD 45 MIN: CPT | Mod: S$PBB,,, | Performed by: PSYCHIATRY & NEUROLOGY

## 2022-06-30 PROCEDURE — 3074F SYST BP LT 130 MM HG: CPT | Mod: CPTII,,, | Performed by: PSYCHIATRY & NEUROLOGY

## 2022-06-30 PROCEDURE — 1160F PR REVIEW ALL MEDS BY PRESCRIBER/CLIN PHARMACIST DOCUMENTED: ICD-10-PCS | Mod: CPTII,,, | Performed by: PSYCHIATRY & NEUROLOGY

## 2022-06-30 PROCEDURE — 99999 PR PBB SHADOW E&M-EST. PATIENT-LVL IV: CPT | Mod: PBBFAC,,, | Performed by: PSYCHIATRY & NEUROLOGY

## 2022-06-30 PROCEDURE — 1159F MED LIST DOCD IN RCRD: CPT | Mod: CPTII,,, | Performed by: PSYCHIATRY & NEUROLOGY

## 2022-06-30 PROCEDURE — 1159F PR MEDICATION LIST DOCUMENTED IN MEDICAL RECORD: ICD-10-PCS | Mod: CPTII,,, | Performed by: PSYCHIATRY & NEUROLOGY

## 2022-06-30 NOTE — PROGRESS NOTES
[facility] - NEUROLOGY 7TH [place] [facility], [place]    Date: 6/30/22  Patient Name: Haseeb Reilly   MRN: [id number]   PCP: Aman Patino  Referring Provider: Aimee Pappas MD    Chief Complaint: Polyneuropathy  Subjective:   Haseeb Reilly is a 51 y.o. male presenting to clinic today for the evaluation of polyneuropathy.        HPI:   Mr. Haseeb Reilly is a 51 y.o. male with HTN, HLD, and DM2 (last A1C 6.8) presenting for evaluation of polyneuropathy of the bilateral arms. Per chart review he was seen by neurosurgery for these complaints but it was determined not to be originating from the cervical spine. He is currently taking 60mg of cymbalta daily. Patient has received a nerve conduction study which showed:   A severe sensory polyneuropathy in bilateral upper extremities and in the right lower extremity. A diabetic polyneuropathy would typically include motor nerve abnormalities and so its difficult to attribute the abnormal findings in this EDX to the patients diabetes. Paraneoplastic panel evaluation that includes anti-Hu antibody and SPEP with immunofixation are recommended.     He states that the numbness of his feet began about 2 years ago and he states his arms began getting numbness about a month ago. He denies any known thyroid problems. He states he has a decreased appetite and occasionally has to force himself to eat. He denies any history of gastric bypass however he is considering it. He denies red meat intake and state he mostly eats pork. He denies any well water consumption. He denies history of syphillis, hepatitis, or HIV. He states he rarely drinks alcohol and does not smoke. He has no history of cancer.           PAST MEDICAL HISTORY:  Past Medical History:   Diagnosis Date    Diabetes mellitus     Hypertension        PAST SURGICAL HISTORY:  No past surgical history on file.    CURRENT MEDS:  Current Outpatient Medications   Medication Sig Dispense  "Refill    atorvastatin (LIPITOR) 40 MG tablet Take 1 tablet (40 mg total) by mouth every evening. High cholesterol 90 tablet 3    blood-glucose meter Misc Accu-Chek Guide Me Glucose Meter   CHECK BLOOD GLUCOSE TWICE DAILY FOR DIABETES      CARTIA  mg Cp24 TAKE 1 CAPSULE BY MOUTH ONCE DAILY FOR HIGH BLOOD PRESSURE      cholecalciferol, vitamin D3, 1,250 mcg (50,000 unit) capsule Take 50,000 Units by mouth once a week.      cyclobenzaprine (FLEXERIL) 10 MG tablet TAKE 1 TABLET BY MOUTH THREE TIMES DAILY AS NEEDED FOR MUSCLE SPASM .  START  OFF  TAKING  AT  NIGHT  FIRST,  THEN  TAKE  AT  DINNERTIME. 90 tablet 0    DULoxetine (CYMBALTA) 60 MG capsule Take 1 capsule (60 mg total) by mouth once daily. 90 capsule 3    famotidine (PEPCID) 40 MG tablet Take 1 tablet (40 mg total) by mouth nightly as needed for Heartburn. 90 tablet 3    furosemide (LASIX) 40 MG tablet TAKE 1 TABLET BY MOUTH ONCE DAILY FOR  WATER  PILL. 30 tablet 0    lancets (ACCU-CHEK FASTCLIX LANCET DRUM [facility]) Accu-Chek Fastclix Lancet Drum   USE TO CHECK GLUCOSE TWICE DAILY FOR DIABETES      linaCLOtide (LINZESS) 145 mcg Cap capsule TAKE 1 CAPSULE BY MOUTH ONCE DAILY FOR CONSTIPATION 90 capsule 1    liraglutide 0.6 mg/0.1 mL, 18 mg/3 mL, subq PNIJ (VICTOZA 2-TIGRE) 0.6 mg/0.1 mL (18 mg/3 mL) PnIj pen Inject 1.8 mg into the skin once daily. 27 mL 3    lisinopriL-hydrochlorothiazide (PRINZIDE,ZESTORETIC) 20-25 mg Tab Take 1 tablet by mouth once daily. High blood pressure 90 tablet 3    metFORMIN (GLUCOPHAGE) 1000 MG tablet TAKE 1 TABLET BY MOUTH TWICE DAILY WITH MEALS 180 tablet 0    naproxen (NAPROSYN) 500 MG tablet Take 1 tablet (500 mg total) by mouth 2 (two) times daily with meals. 14 tablet 0    pantoprazole (PROTONIX) 40 MG tablet Take 1 tablet (40 mg total) by mouth once daily. heartburn 90 tablet 3    pen needle, diabetic (BD ULTRA-FINE SHORT PEN NEEDLE) 31 gauge x 5/16" Ndle USE 1 PEN NEEDLE TO INJECT INSULIN ONCE DAILY. 100 each " "0    pen needle, diabetic 31 gauge x 1/4" Ndle pen needle, diabetic 31 gauge x 1/4"   USE 1 PEN NEEDLE WITH VICTOZA ONCE DAILY      pen needle, diabetic 31 gauge x 5/16" Ndle BD Ultra-Fine Short Pen Needle 31 gauge x 5/16"   USE 1 PEN NEEDLE TO INJECT INSULIN ONCE DAILY.      promethazine (PHENERGAN) 25 MG tablet TAKE 1 TABLET BY MOUTH THREE TIMES DAILY AS NEEDED FOR NAUSEA 60 tablet 0    sucralfate (CARAFATE) 1 gram tablet sucralfate 1 gram tablet   TAKE 1 TABLET BY MOUTH 4 TIMES DAILY      potassium chloride (KLOR-CON) 8 MEQ TbSR Take 1 tablet (8 mEq total) by mouth once daily. (Patient not taking: Reported on 6/30/2022) 90 tablet 3     No current facility-administered medications for this visit.       ALLERGIES:  Review of patient's allergies indicates:   Allergen Reactions    Morphine        FAMILY HISTORY:  No family history on file.    SOCIAL HISTORY:  Social History     Tobacco Use    Smoking status: Never Smoker    Smokeless tobacco: Never Used   Substance Use Topics    Alcohol use: No    Drug use: No       Review of Systems:  Review of Systems   Constitutional: Negative for chills, fever and weight loss.   HENT: Negative for ear discharge, ear pain, hearing loss, sinus pain, sore throat and tinnitus.    Eyes: Negative for blurred vision, double vision and photophobia.   Respiratory: Negative for cough, shortness of breath and wheezing.    Cardiovascular: Negative for chest pain, palpitations and orthopnea.   Gastrointestinal: Negative for constipation, diarrhea, nausea and vomiting.   Genitourinary: Negative for dysuria, frequency and urgency.   Musculoskeletal: Positive for back pain. Negative for myalgias and neck pain.   Neurological: Positive for tingling. Negative for seizures, loss of consciousness, weakness and headaches.            Objective:     Vitals:    06/30/22 1300   BP: (!) 129/91   BP Location: Left arm   Patient Position: Sitting   Pulse: 90   Weight: 131.8 kg (290 lb 7.3 oz) " "  Height: 5' 11" (1.803 m)         NEUROLOGICAL EXAMINATION:     MENTAL STATUS   Oriented to person, place, and time.   Level of consciousness: drowsy ,  alert    MOTOR EXAM     Strength   Strength 5/5 except as noted.        Patient had significant pain with deltoid use    Wrist flexion bilateral 4+ L and 4 R    APB Left 4    Hip Flexion 4+ Right  Hip Flexion 4 Left    Right Knee extension 4                 REFLEXES     Reflexes   Right brachioradialis: 1+  Left brachioradialis: 1+  Right biceps: 1+  Left biceps: 1+  Right triceps: 1+  Left triceps: 1+  Right patellar reflex grade: trace.  Left patellar: 1+  Right achilles: 0  Left achilles: 0    SENSORY EXAM   Light touch normal.   Vibration normal.   Pinprick normal.   Sensory deficit distribution on right: median       + Tinel on L side              ? ?        Assessment:   Haseeb Reilly is a 51 y.o. male presenting for evaluation of bilateral upper and lower polyneuropathy.     Plan:     Problem List Items Addressed This Visit     Polyneuropathy - Primary    Current Assessment & Plan     Unknown underlying etiology may be due to the diabetes however could also be multifactorial. Lab orders were placed to help determine if there is further need for work up.            Relevant Orders    Heavy Metals Screen, Blood (Quantitative)    Methylmalonic Acid, Serum    Phosphatidylethanol (PETH)    RPR    Sedimentation rate    TSH    Vitamin B1    Vitamin B12 Deficiency Panel    Vitamin B2    Vitamin B6    Paraneoplastic Autoantibody Evaulation, Serum    PROTEIN ELECTROPHORESIS, SERUM    IMMUNOFIXATION ELECTROPHORESIS, SERUM    Copper, Serum    Hepatitis Panel, Acute    Type 2 diabetes mellitus without complication, without long-term current use of insulin    Current Assessment & Plan     Patient is managed by PCP last A1C was 6.8                Essential hypertension    Current Assessment & Plan     Discussed with patient importance of management of hypertension due " to secondary stroke risk. Patient is on appropriate therapy currently managed by PCP.              Hyperlipidemia    Current Assessment & Plan     Discussed with patient importance of management of hyperlipidemia due to secondary stroke risk. Patient is on appropriate therapy currently managed by PCP.              Osteoarthritis of spine with radiculopathy, lumbar region            I spent a total of 45 minutes on the day of the visit. This includes face to face time and non-face to face time preparing to see the patient (eg, review of tests), obtaining and/or reviewing separately obtained history, documenting clinical information in the electronic or other health record, independently interpreting results and communicating results to the patient/family/caregiver, or care coordinator.    Viktoriya Daniel PA-C  Supervising physician Teja Crum MD was avalible for all questions during this exam.  [facility]

## 2022-06-30 NOTE — ASSESSMENT & PLAN NOTE
Unknown underlying etiology may be due to the diabetes however could also be multifactorial. Lab orders were placed to help determine if there is further need for work up.

## 2022-07-13 DIAGNOSIS — E11.9 TYPE 2 DIABETES MELLITUS WITHOUT COMPLICATION, UNSPECIFIED WHETHER LONG TERM INSULIN USE: ICD-10-CM

## 2022-07-13 DIAGNOSIS — E11.9 TYPE 2 DIABETES MELLITUS WITHOUT COMPLICATION: ICD-10-CM

## 2022-07-18 ENCOUNTER — OFFICE VISIT (OUTPATIENT)
Dept: PRIMARY CARE CLINIC | Facility: CLINIC | Age: 52
End: 2022-07-18
Payer: MEDICAID

## 2022-07-18 VITALS
HEART RATE: 81 BPM | SYSTOLIC BLOOD PRESSURE: 124 MMHG | DIASTOLIC BLOOD PRESSURE: 84 MMHG | WEIGHT: 285.25 LBS | BODY MASS INDEX: 39.94 KG/M2 | HEIGHT: 71 IN | OXYGEN SATURATION: 97 %

## 2022-07-18 DIAGNOSIS — M25.511 CHRONIC RIGHT SHOULDER PAIN: ICD-10-CM

## 2022-07-18 DIAGNOSIS — E11.9 TYPE 2 DIABETES MELLITUS WITHOUT COMPLICATION, WITHOUT LONG-TERM CURRENT USE OF INSULIN: ICD-10-CM

## 2022-07-18 DIAGNOSIS — G89.29 CHRONIC RIGHT-SIDED LOW BACK PAIN WITH RIGHT-SIDED SCIATICA: ICD-10-CM

## 2022-07-18 DIAGNOSIS — G62.9 POLYNEUROPATHY: Primary | ICD-10-CM

## 2022-07-18 DIAGNOSIS — G89.29 CHRONIC RIGHT SHOULDER PAIN: ICD-10-CM

## 2022-07-18 DIAGNOSIS — Z00.00 ANNUAL PHYSICAL EXAM: ICD-10-CM

## 2022-07-18 DIAGNOSIS — E66.01 CLASS 3 SEVERE OBESITY WITH BODY MASS INDEX (BMI) OF 40.0 TO 44.9 IN ADULT, UNSPECIFIED OBESITY TYPE, UNSPECIFIED WHETHER SERIOUS COMORBIDITY PRESENT: ICD-10-CM

## 2022-07-18 DIAGNOSIS — M54.41 CHRONIC RIGHT-SIDED LOW BACK PAIN WITH RIGHT-SIDED SCIATICA: ICD-10-CM

## 2022-07-18 DIAGNOSIS — K21.9 GASTRIC REFLUX: ICD-10-CM

## 2022-07-18 PROCEDURE — 1160F PR REVIEW ALL MEDS BY PRESCRIBER/CLIN PHARMACIST DOCUMENTED: ICD-10-PCS | Mod: CPTII,,, | Performed by: FAMILY MEDICINE

## 2022-07-18 PROCEDURE — 99999 PR PBB SHADOW E&M-EST. PATIENT-LVL V: CPT | Mod: PBBFAC,,, | Performed by: FAMILY MEDICINE

## 2022-07-18 PROCEDURE — 4010F PR ACE/ARB THEARPY RXD/TAKEN: ICD-10-PCS | Mod: CPTII,,, | Performed by: FAMILY MEDICINE

## 2022-07-18 PROCEDURE — 3044F PR MOST RECENT HEMOGLOBIN A1C LEVEL <7.0%: ICD-10-PCS | Mod: CPTII,,, | Performed by: FAMILY MEDICINE

## 2022-07-18 PROCEDURE — 1159F PR MEDICATION LIST DOCUMENTED IN MEDICAL RECORD: ICD-10-PCS | Mod: CPTII,,, | Performed by: FAMILY MEDICINE

## 2022-07-18 PROCEDURE — 3044F HG A1C LEVEL LT 7.0%: CPT | Mod: CPTII,,, | Performed by: FAMILY MEDICINE

## 2022-07-18 PROCEDURE — 3074F PR MOST RECENT SYSTOLIC BLOOD PRESSURE < 130 MM HG: ICD-10-PCS | Mod: CPTII,,, | Performed by: FAMILY MEDICINE

## 2022-07-18 PROCEDURE — 99214 OFFICE O/P EST MOD 30 MIN: CPT | Mod: S$PBB,,, | Performed by: FAMILY MEDICINE

## 2022-07-18 PROCEDURE — 3008F PR BODY MASS INDEX (BMI) DOCUMENTED: ICD-10-PCS | Mod: CPTII,,, | Performed by: FAMILY MEDICINE

## 2022-07-18 PROCEDURE — 1160F RVW MEDS BY RX/DR IN RCRD: CPT | Mod: CPTII,,, | Performed by: FAMILY MEDICINE

## 2022-07-18 PROCEDURE — 3008F BODY MASS INDEX DOCD: CPT | Mod: CPTII,,, | Performed by: FAMILY MEDICINE

## 2022-07-18 PROCEDURE — 99214 PR OFFICE/OUTPT VISIT, EST, LEVL IV, 30-39 MIN: ICD-10-PCS | Mod: S$PBB,,, | Performed by: FAMILY MEDICINE

## 2022-07-18 PROCEDURE — 99215 OFFICE O/P EST HI 40 MIN: CPT | Mod: PBBFAC,PN | Performed by: FAMILY MEDICINE

## 2022-07-18 PROCEDURE — 4010F ACE/ARB THERAPY RXD/TAKEN: CPT | Mod: CPTII,,, | Performed by: FAMILY MEDICINE

## 2022-07-18 PROCEDURE — 3074F SYST BP LT 130 MM HG: CPT | Mod: CPTII,,, | Performed by: FAMILY MEDICINE

## 2022-07-18 PROCEDURE — 1159F MED LIST DOCD IN RCRD: CPT | Mod: CPTII,,, | Performed by: FAMILY MEDICINE

## 2022-07-18 PROCEDURE — 3079F PR MOST RECENT DIASTOLIC BLOOD PRESSURE 80-89 MM HG: ICD-10-PCS | Mod: CPTII,,, | Performed by: FAMILY MEDICINE

## 2022-07-18 PROCEDURE — 99999 PR PBB SHADOW E&M-EST. PATIENT-LVL V: ICD-10-PCS | Mod: PBBFAC,,, | Performed by: FAMILY MEDICINE

## 2022-07-18 PROCEDURE — 3079F DIAST BP 80-89 MM HG: CPT | Mod: CPTII,,, | Performed by: FAMILY MEDICINE

## 2022-07-18 RX ORDER — SUCRALFATE 1 G/1
1 TABLET ORAL
Qty: 60 TABLET | Refills: 3 | Status: SHIPPED | OUTPATIENT
Start: 2022-07-18 | End: 2023-04-13

## 2022-07-18 RX ORDER — EMPAGLIFLOZIN 10 MG/1
TABLET, FILM COATED ORAL
COMMUNITY
End: 2022-09-09

## 2022-07-18 RX ORDER — CYCLOBENZAPRINE HCL 10 MG
10 TABLET ORAL 3 TIMES DAILY
Qty: 90 TABLET | Refills: 6 | Status: SHIPPED | OUTPATIENT
Start: 2022-07-18 | End: 2023-04-13 | Stop reason: SDUPTHER

## 2022-07-18 RX ORDER — POLYETHYLENE GLYCOL 3350 17 G/17G
POWDER, FOR SOLUTION ORAL
COMMUNITY
End: 2023-04-13

## 2022-07-18 RX ORDER — DULAGLUTIDE 3 MG/.5ML
3 INJECTION, SOLUTION SUBCUTANEOUS
Qty: 4 PEN | Refills: 2 | Status: SHIPPED | OUTPATIENT
Start: 2022-07-18 | End: 2022-10-16

## 2022-07-18 RX ORDER — PEN NEEDLE, DIABETIC 30 GX3/16"
NEEDLE, DISPOSABLE MISCELLANEOUS
COMMUNITY
End: 2023-07-05

## 2022-07-18 NOTE — PROGRESS NOTES
Clinic Note  7/18/2022      Subjective:       Patient ID:  Haseeb is a 51 y.o. male being seen for an established visit.    Chief Complaint: Follow-up    Polyneuropathy-has been evaluated by Neurosurgery and Neurology.  Patient has had MRI of his cervical spine and neck, and there is concern that patient has worse symptoms compared to what his imaging shows.  Now getting a workup by Neurology for other causes of patient's polyneuropathy.  Was last seen by Neurology several weeks ago, has labs ordered but has not gotten it done yet.  Patient reports significant numbness and tingling and pain of his bilateral upper extremities whenever patient is lying in bed.      Chronic lower back pain-has had MRI of his lumbar spine, evaluated by Neurosurgery who believes that his pain is out of proportion with what his MRI shows.  Being evaluated by Neurology for other causes of his neuropathy.    Right shoulder pain-having significant right shoulder pain with decreased range of motion    Type 2 diabetes-on metformin, daily Victoza, Jardiance.  Doing well on this.    Obesity-would like possible gastric sleeve    GERD and poor appetite-patient reports that he has been having poor appetite with significant nausea and vomiting associated with many of his meals.  Has been evaluated by GI in the past with a normal gastric emptying study in 2021, had an on upper endoscopy at [facility] last year showing no lining of his stomach .  Patient reports doing well on Victoza, however was given multiple different medications afterwards that he thinks caused his stomach to have issues including gabapentin.      Review of Systems   Constitutional: Negative for chills, fever, malaise/fatigue and weight loss.   HENT: Negative for congestion, sinus pain and sore throat.    Respiratory: Negative for cough, shortness of breath and wheezing.    Cardiovascular: Negative for chest pain and palpitations.   Gastrointestinal: Negative for constipation,  "diarrhea, nausea and vomiting.   Genitourinary: Negative for dysuria, frequency and urgency.   Musculoskeletal: Positive for back pain, joint pain and neck pain. Negative for myalgias.   Skin: Negative for rash.   Neurological: Negative for headaches.       Medication List with Changes/Refills   New Medications    DULAGLUTIDE (TRULICITY) 3 MG/0.5 ML PEN INJECTOR    Inject 3 mg into the skin every 7 days.   Current Medications    ATORVASTATIN (LIPITOR) 40 MG TABLET    Take 1 tablet (40 mg total) by mouth every evening. High cholesterol    BLOOD-GLUCOSE METER [facility]    Accu-Chek Guide Me Glucose Meter   CHECK BLOOD GLUCOSE TWICE DAILY FOR DIABETES    CARTIA  MG CP24    TAKE 1 CAPSULE BY MOUTH ONCE DAILY FOR HIGH BLOOD PRESSURE    CHOLECALCIFEROL, VITAMIN D3, 1,250 MCG (50,000 UNIT) CAPSULE    Take 50,000 Units by mouth once a week.    DULOXETINE (CYMBALTA) 60 MG CAPSULE    Take 1 capsule (60 mg total) by mouth once daily.    EMPAGLIFLOZIN (JARDIANCE) 10 MG TABLET    Jardiance 10 mg tablet    FAMOTIDINE (PEPCID) 40 MG TABLET    Take 1 tablet (40 mg total) by mouth nightly as needed for Heartburn.    FUROSEMIDE (LASIX) 40 MG TABLET    TAKE 1 TABLET BY MOUTH ONCE DAILY FOR  WATER  PILL.    LANCETS (ACCU-CHEK FASTCLIX LANCET DRUM [facility])    Accu-Chek Fastclix Lancet Drum   USE TO CHECK GLUCOSE TWICE DAILY FOR DIABETES    LINACLOTIDE (LINZESS) 145 MCG CAP CAPSULE    TAKE 1 CAPSULE BY MOUTH ONCE DAILY FOR CONSTIPATION    LISINOPRIL-HYDROCHLOROTHIAZIDE (PRINZIDE,ZESTORETIC) 20-25 MG TAB    Take 1 tablet by mouth once daily. High blood pressure    METFORMIN (GLUCOPHAGE) 1000 MG TABLET    TAKE 1 TABLET BY MOUTH TWICE DAILY WITH MEALS    NAPROXEN (NAPROSYN) 500 MG TABLET    Take 1 tablet (500 mg total) by mouth 2 (two) times daily with meals.    PANTOPRAZOLE (PROTONIX) 40 MG TABLET    Take 1 tablet (40 mg total) by mouth once daily. heartburn    PEN NEEDLE, DIABETIC (BD ULTRA-FINE SHORT PEN NEEDLE) 31 GAUGE X 5/16" NDLE    " "USE 1 PEN NEEDLE TO INJECT INSULIN ONCE DAILY.    PEN NEEDLE, DIABETIC 31 GAUGE X 1/4" NDLE    pen needle, diabetic 31 gauge x 1/4"   USE 1 PEN NEEDLE WITH VICTOZA ONCE DAILY    PEN NEEDLE, DIABETIC 31 GAUGE X 5/16" NDLE    BD Ultra-Fine Short Pen Needle 31 gauge x 5/16"   USE 1 PEN NEEDLE TO INJECT INSULIN ONCE DAILY.    PEN NEEDLE, DIABETIC 31 GAUGE X 5/16" NDLE    BD Ultra-Fine Short Pen Needle 31 gauge x 5/16"   USE 1 PEN NEEDLE TO INJECT INSULIN ONCE DAILY    POLYETHYLENE GLYCOL (GLYCOLAX) 17 GRAM/DOSE POWDER    polyethylene glycol 3350 17 gram/dose oral powder    PROMETHAZINE (PHENERGAN) 25 MG TABLET    TAKE 1 TABLET BY MOUTH THREE TIMES DAILY AS NEEDED FOR NAUSEA   Changed and/or Refilled Medications    Modified Medication Previous Medication    CYCLOBENZAPRINE (FLEXERIL) 10 MG TABLET cyclobenzaprine (FLEXERIL) 10 MG tablet       Take 1 tablet (10 mg total) by mouth 3 (three) times daily.    TAKE 1 TABLET BY MOUTH THREE TIMES DAILY AS NEEDED FOR MUSCLE SPASM. START OFF TAKING AT NIGHT FIRST, THEN TAKE AT DINNERTIME    SUCRALFATE (CARAFATE) 1 GRAM TABLET sucralfate (CARAFATE) 1 gram tablet       Take 1 tablet (1 g total) by mouth 4 (four) times daily before meals and nightly. (lines the stomach)    sucralfate 1 gram tablet   TAKE 1 TABLET BY MOUTH 4 TIMES DAILY   Discontinued Medications    LIRAGLUTIDE 0.6 MG/0.1 ML, 18 MG/3 ML, SUBQ PNIJ (VICTOZA 2-TIGRE) 0.6 MG/0.1 ML (18 MG/3 ML) PNIJ PEN    Inject 1.8 mg into the skin once daily.    POTASSIUM CHLORIDE (KLOR-CON) 8 MEQ TBSR    Take 1 tablet (8 mEq total) by mouth once daily.       Patient Active Problem List   Diagnosis    Chronic right-sided low back pain with right-sided sciatica    Type 2 diabetes mellitus without complication, without long-term current use of insulin    Class 3 severe obesity with body mass index (BMI) of 40.0 to 44.9 in adult    Constipation    Essential hypertension    Hyperlipidemia    Rectal mass    Osteoarthritis of spine " "with radiculopathy, lumbar region    Polyneuropathy           Objective:      /84 (BP Location: Right arm, Patient Position: Sitting, BP Method: Large (Automatic))   Pulse 81   Ht 5' 11" (1.803 m)   Wt 129.4 kg (285 lb 4.4 oz)   SpO2 97%   BMI 39.79 kg/m²   Estimated body mass index is 39.79 kg/m² as calculated from the following:    Height as of this encounter: 5' 11" (1.803 m).    Weight as of this encounter: 129.4 kg (285 lb 4.4 oz).  Physical Exam  Vitals reviewed.   Constitutional:       General: He is not in acute distress.     Appearance: He is obese. He is not diaphoretic.   HENT:      Head: Normocephalic and atraumatic.   Eyes:      Conjunctiva/sclera: Conjunctivae normal.   Cardiovascular:      Rate and Rhythm: Normal rate and regular rhythm.      Heart sounds: Normal heart sounds.   Pulmonary:      Effort: Pulmonary effort is normal. No respiratory distress.      Breath sounds: Normal breath sounds. No wheezing.   Abdominal:      General: Bowel sounds are normal.      Palpations: Abdomen is soft.   Musculoskeletal:         General: Normal range of motion.        Arms:       Cervical back: Normal range of motion.   Skin:     General: Skin is warm and dry.      Findings: No erythema or rash.   Neurological:      Mental Status: He is alert and oriented to person, place, and time.   Psychiatric:         Mood and Affect: Mood and affect normal.         Behavior: Behavior normal.         Thought Content: Thought content normal.         Judgment: Judgment normal.           Assessment and Plan:     1. Polyneuropathy  - currently being worked up by Neurology, being followed by Neurosurgery after neurological workup    2. Chronic right-sided low back pain with right-sided sciatica  - see above  - cyclobenzaprine (FLEXERIL) 10 MG tablet; Take 1 tablet (10 mg total) by mouth 3 (three) times daily.  Dispense: 90 tablet; Refill: 6    3. Type 2 diabetes mellitus without complication, without long-term " current use of insulin  - A1c well controlled 6.8 on Victoza 1.8 mg q.d., Jardiance 10 mg q.d., metformin 1000 mg b.i.d. Will try switch to Trulicity to see if covered by insurance  - dulaglutide (TRULICITY) 3 mg/0.5 mL pen injector; Inject 3 mg into the skin every 7 days.  Dispense: 4 pen; Refill: 2    4. Class 3 severe obesity with body mass index (BMI) of 40.0 to 44.9 in adult, unspecified obesity type, unspecified whether serious comorbidity present  - Ambulatory referral/consult to Bariatric Surgery; Future    5. Gastric reflux  - has had extensive workup by this with GI in the past, continue pantoprazole 40 mg q.d., Pepcid, will restart Carafate.  Discussed with patient that some of his GI symptoms may be related to Victoza  - sucralfate (CARAFATE) 1 gram tablet; Take 1 tablet (1 g total) by mouth 4 (four) times daily before meals and nightly. (lines the stomach)  Dispense: 60 tablet; Refill: 3    6. Annual physical exam  - Ambulatory referral/consult to Optometry; Future    7. Chronic right shoulder pain  - impingement syndrome versus frozen shoulder versus rotator cuff pathology.  Refer to physical therapy and follow-up with orthopedics  - Ambulatory referral/consult to Physical/Occupational Therapy; Future  - Ambulatory referral/consult to Orthopedics; Future      Follow Up:   No follow-ups on file.    Other Orders Placed This Visit:  Orders Placed This Encounter   Procedures    Ambulatory referral/consult to Bariatric Surgery    Ambulatory referral/consult to Optometry    Ambulatory referral/consult to Physical/Occupational Therapy    Ambulatory referral/consult to Orthopedics         Aman Patino MD        This note is dictated on M*Modal word recognition program.  There are word recognition mistakes that are occasionally missed on review.

## 2022-07-19 ENCOUNTER — LAB VISIT (OUTPATIENT)
Dept: LAB | Facility: HOSPITAL | Age: 52
End: 2022-07-19
Attending: PSYCHIATRY & NEUROLOGY
Payer: MEDICAID

## 2022-07-19 DIAGNOSIS — G62.9 POLYNEUROPATHY: ICD-10-CM

## 2022-07-19 PROCEDURE — 36415 COLL VENOUS BLD VENIPUNCTURE: CPT | Performed by: PSYCHIATRY & NEUROLOGY

## 2022-07-19 PROCEDURE — 82525 ASSAY OF COPPER: CPT | Performed by: PSYCHIATRY & NEUROLOGY

## 2022-07-19 PROCEDURE — 80074 ACUTE HEPATITIS PANEL: CPT | Performed by: PSYCHIATRY & NEUROLOGY

## 2022-07-20 LAB
HAV IGM SERPL QL IA: NEGATIVE
HBV CORE IGM SERPL QL IA: NEGATIVE
HBV SURFACE AG SERPL QL IA: NEGATIVE
HCV AB SERPL QL IA: NEGATIVE

## 2022-07-22 ENCOUNTER — TELEPHONE (OUTPATIENT)
Dept: PRIMARY CARE CLINIC | Facility: CLINIC | Age: 52
End: 2022-07-22
Payer: MEDICAID

## 2022-07-22 ENCOUNTER — TELEPHONE (OUTPATIENT)
Dept: BARIATRICS | Facility: CLINIC | Age: 52
End: 2022-07-22
Payer: MEDICAID

## 2022-07-22 DIAGNOSIS — E66.01 CLASS 3 SEVERE OBESITY WITH BODY MASS INDEX (BMI) OF 40.0 TO 44.9 IN ADULT, UNSPECIFIED OBESITY TYPE, UNSPECIFIED WHETHER SERIOUS COMORBIDITY PRESENT: Primary | ICD-10-CM

## 2022-07-22 LAB — COPPER SERPL-MCNC: 1052 UG/L (ref 665–1480)

## 2022-07-23 NOTE — TELEPHONE ENCOUNTER
----- Message from Olena Hood sent at 7/22/2022  4:35 PM CDT -----  Regarding: Referral  Good afternoon,    Thank you for the Bariatric Referral. Mr. Reilly has Medicaid as his primary insurance.  Unfortunately, the [facility] does not accept Medicaid for Bariatric Services. [facility] ([phone] or [phone]: Fax [phone]) and [facility] ([phone] or [phone]: Fax [phone]) Bariatric Clinic accept Medicaid.    Thanks,    Olena Hood  [facility]

## 2022-07-28 DIAGNOSIS — I10 ESSENTIAL HYPERTENSION: ICD-10-CM

## 2022-07-28 RX ORDER — FUROSEMIDE 40 MG/1
TABLET ORAL
Qty: 90 TABLET | Refills: 3 | Status: SHIPPED | OUTPATIENT
Start: 2022-07-28 | End: 2023-07-05 | Stop reason: SDUPTHER

## 2022-07-28 NOTE — TELEPHONE ENCOUNTER
----- Message from Aislinnmartina Reilly sent at 7/28/2022  3:57 PM CDT -----  Contact: Pt Mobile [phone]  Patient would like a call back right away in regards to him saying that he came in to see you on 07/18/2022 and he put in an order for all of his scripts to be sent in, patient said that his furosemide (LASIX) 40 MG tablet was not sent in and he's been calling several times for this script to be sent ion and he's almost out of this medication.     Patient would like for you to send his script in right away to..    [facility] [address] [place] [address] - [address]  [address]  [facility] [address]  Phone: [phone] Fax: [phone]

## 2022-07-30 NOTE — TELEPHONE ENCOUNTER
----- Message from Raciel Garg MA sent at 7/29/2022  5:02 PM CDT -----  Contact: [phone]  Insurance company needs permission from you to override the medication.   ----- Message -----  From: Aman Patino MD  Sent: 7/29/2022   2:02 PM CDT  To: Carey Vega Staff    Can you call pharmacy and ask if insurance only covers 30 tablets of flexeril at a time, is there anything else we can do so that pt gets 90 tablets at a time?        ----- Message -----  From: Kings Ledesma MA  Sent: 7/29/2022  10:48 AM CDT  To: Aman Patino MD      ----- Message -----  From: Estefanía Morse  Sent: 7/29/2022  10:23 AM CDT  To: Carey Vega Staff    Patient states that [facility] is only giving him a 30 pills and states the insurance will not allow more than that.  Please call the Insurance company to override the current prescription.    RX   cyclobenzaprine (FLEXERIL) 10 MG tablet    Please call and advise.    Thank You

## 2022-07-30 NOTE — TELEPHONE ENCOUNTER
I'm not sure what that means, but can you call his insurance company and say that he can get flexeril 10mg, 90 tablets at a time?

## 2022-08-01 ENCOUNTER — TELEPHONE (OUTPATIENT)
Dept: NEUROLOGY | Facility: CLINIC | Age: 52
End: 2022-08-01
Payer: MEDICAID

## 2022-08-01 DIAGNOSIS — G62.9 POLYNEUROPATHY: Primary | ICD-10-CM

## 2022-08-01 NOTE — TELEPHONE ENCOUNTER
----- Message from Lety Simons sent at 8/1/2022 12:33 PM CDT -----    Haseeb Reilly MRN [id number] asking to be seen. States he has had pain enough has been seen in ER. ER told patient they couldnt help him and he needed to contact Dr. Crum office      Patient requesting return call  Please contact @# [phone]

## 2022-08-02 ENCOUNTER — LAB VISIT (OUTPATIENT)
Dept: LAB | Facility: HOSPITAL | Age: 52
End: 2022-08-02
Payer: MEDICAID

## 2022-08-02 DIAGNOSIS — G62.9 POLYNEUROPATHY: ICD-10-CM

## 2022-08-02 LAB
ERYTHROCYTE [SEDIMENTATION RATE] IN BLOOD BY PHOTOMETRIC METHOD: 55 MM/HR (ref 0–23)
ESTIMATED AVG GLUCOSE: 137 MG/DL (ref 68–131)
HBA1C MFR BLD: 6.4 % (ref 4–5.6)
T4 SERPL-MCNC: 5.6 UG/DL (ref 4.5–11.5)
TSH SERPL DL<=0.005 MIU/L-ACNC: 1.52 UIU/ML (ref 0.4–4)

## 2022-08-02 PROCEDURE — 85652 RBC SED RATE AUTOMATED: CPT | Performed by: PHYSICIAN ASSISTANT

## 2022-08-02 PROCEDURE — 87522 HEPATITIS C REVRS TRNSCRPJ: CPT | Performed by: PHYSICIAN ASSISTANT

## 2022-08-02 PROCEDURE — 82300 ASSAY OF CADMIUM: CPT | Performed by: PHYSICIAN ASSISTANT

## 2022-08-02 PROCEDURE — 80321 ALCOHOLS BIOMARKERS 1OR 2: CPT | Performed by: PHYSICIAN ASSISTANT

## 2022-08-02 PROCEDURE — 84252 ASSAY OF VITAMIN B-2: CPT | Performed by: PHYSICIAN ASSISTANT

## 2022-08-02 PROCEDURE — 82607 VITAMIN B-12: CPT | Performed by: PHYSICIAN ASSISTANT

## 2022-08-02 PROCEDURE — 83921 ORGANIC ACID SINGLE QUANT: CPT | Performed by: PHYSICIAN ASSISTANT

## 2022-08-02 PROCEDURE — 84436 ASSAY OF TOTAL THYROXINE: CPT | Performed by: PHYSICIAN ASSISTANT

## 2022-08-02 PROCEDURE — 86592 SYPHILIS TEST NON-TREP QUAL: CPT | Performed by: PHYSICIAN ASSISTANT

## 2022-08-02 PROCEDURE — 84207 ASSAY OF VITAMIN B-6: CPT | Performed by: PHYSICIAN ASSISTANT

## 2022-08-02 PROCEDURE — 84425 ASSAY OF VITAMIN B-1: CPT | Performed by: PHYSICIAN ASSISTANT

## 2022-08-02 PROCEDURE — 83036 HEMOGLOBIN GLYCOSYLATED A1C: CPT | Performed by: PHYSICIAN ASSISTANT

## 2022-08-02 PROCEDURE — 84443 ASSAY THYROID STIM HORMONE: CPT | Performed by: PHYSICIAN ASSISTANT

## 2022-08-02 PROCEDURE — 82525 ASSAY OF COPPER: CPT | Performed by: PHYSICIAN ASSISTANT

## 2022-08-02 PROCEDURE — 36415 COLL VENOUS BLD VENIPUNCTURE: CPT | Performed by: PHYSICIAN ASSISTANT

## 2022-08-03 LAB — RPR SER QL: NORMAL

## 2022-08-04 DIAGNOSIS — E53.8 LOW SERUM VITAMIN B12: Primary | ICD-10-CM

## 2022-08-04 LAB
ARSENIC BLD-MCNC: <1 NG/ML
CADMIUM BLD-MCNC: <0.2 NG/ML
CITY: NORMAL
COUNTY: NORMAL
GUARDIAN FIRST NAME: NORMAL
GUARDIAN LAST NAME: NORMAL
HCV RNA SERPL NAA+PROBE-ACNC: NORMAL IU/ML
HOME PHONE: NORMAL
LEAD BLD-MCNC: <1 MCG/DL
MERCURY BLD-MCNC: <1 NG/ML
RACE: NORMAL
STATE: NORMAL
STREET ADDRESS: NORMAL
VENOUS/CAPILLARY: NORMAL
VIT B12 SERPL-MCNC: 276 NG/L (ref 180–914)
ZIP: NORMAL

## 2022-08-04 RX ORDER — CYANOCOBALAMIN 1000 UG/ML
INJECTION, SOLUTION INTRAMUSCULAR; SUBCUTANEOUS
Qty: 1 ML | Refills: 7 | Status: SHIPPED | OUTPATIENT
Start: 2022-08-04 | End: 2022-10-20 | Stop reason: SDUPTHER

## 2022-08-05 LAB — COPPER SERPL-MCNC: 1098 UG/L (ref 665–1480)

## 2022-08-06 LAB
METHYLMALONATE SERPL-SCNC: 0.22 NMOL/ML
METHYLMALONATE SERPL-SCNC: 0.26 UMOL/L
PETH 16:0/18.1 (POPETH): <10 NG/ML
PETH 16:0/18.2 (PLPETH): <10 NG/ML
VIT B2 SERPL-MCNC: 4 MCG/L (ref 1–19)

## 2022-08-08 LAB
PYRIDOXAL SERPL-MCNC: 5 UG/L (ref 5–50)
VIT B1 BLD-MCNC: 59 UG/L (ref 38–122)

## 2022-08-12 ENCOUNTER — OFFICE VISIT (OUTPATIENT)
Dept: PRIMARY CARE CLINIC | Facility: CLINIC | Age: 52
End: 2022-08-12
Payer: MEDICAID

## 2022-08-12 ENCOUNTER — TELEPHONE (OUTPATIENT)
Dept: NEUROLOGY | Facility: CLINIC | Age: 52
End: 2022-08-12
Payer: MEDICAID

## 2022-08-12 VITALS
OXYGEN SATURATION: 95 % | DIASTOLIC BLOOD PRESSURE: 92 MMHG | BODY MASS INDEX: 39.43 KG/M2 | HEIGHT: 71 IN | HEART RATE: 109 BPM | SYSTOLIC BLOOD PRESSURE: 120 MMHG | WEIGHT: 281.63 LBS

## 2022-08-12 DIAGNOSIS — N52.9 ERECTILE DYSFUNCTION, UNSPECIFIED ERECTILE DYSFUNCTION TYPE: Primary | ICD-10-CM

## 2022-08-12 DIAGNOSIS — G62.9 POLYNEUROPATHY: ICD-10-CM

## 2022-08-12 PROCEDURE — 99212 OFFICE O/P EST SF 10 MIN: CPT | Mod: S$PBB,,, | Performed by: NURSE PRACTITIONER

## 2022-08-12 PROCEDURE — 4010F PR ACE/ARB THEARPY RXD/TAKEN: ICD-10-PCS | Mod: CPTII,,, | Performed by: NURSE PRACTITIONER

## 2022-08-12 PROCEDURE — 99999 PR PBB SHADOW E&M-EST. PATIENT-LVL III: ICD-10-PCS | Mod: PBBFAC,,, | Performed by: NURSE PRACTITIONER

## 2022-08-12 PROCEDURE — 3044F PR MOST RECENT HEMOGLOBIN A1C LEVEL <7.0%: ICD-10-PCS | Mod: CPTII,,, | Performed by: NURSE PRACTITIONER

## 2022-08-12 PROCEDURE — 4010F ACE/ARB THERAPY RXD/TAKEN: CPT | Mod: CPTII,,, | Performed by: NURSE PRACTITIONER

## 2022-08-12 PROCEDURE — 1160F PR REVIEW ALL MEDS BY PRESCRIBER/CLIN PHARMACIST DOCUMENTED: ICD-10-PCS | Mod: CPTII,,, | Performed by: NURSE PRACTITIONER

## 2022-08-12 PROCEDURE — 3080F PR MOST RECENT DIASTOLIC BLOOD PRESSURE >= 90 MM HG: ICD-10-PCS | Mod: CPTII,,, | Performed by: NURSE PRACTITIONER

## 2022-08-12 PROCEDURE — 1159F MED LIST DOCD IN RCRD: CPT | Mod: CPTII,,, | Performed by: NURSE PRACTITIONER

## 2022-08-12 PROCEDURE — 3080F DIAST BP >= 90 MM HG: CPT | Mod: CPTII,,, | Performed by: NURSE PRACTITIONER

## 2022-08-12 PROCEDURE — 1160F RVW MEDS BY RX/DR IN RCRD: CPT | Mod: CPTII,,, | Performed by: NURSE PRACTITIONER

## 2022-08-12 PROCEDURE — 1159F PR MEDICATION LIST DOCUMENTED IN MEDICAL RECORD: ICD-10-PCS | Mod: CPTII,,, | Performed by: NURSE PRACTITIONER

## 2022-08-12 PROCEDURE — 3008F BODY MASS INDEX DOCD: CPT | Mod: CPTII,,, | Performed by: NURSE PRACTITIONER

## 2022-08-12 PROCEDURE — 99213 OFFICE O/P EST LOW 20 MIN: CPT | Mod: PBBFAC,PN | Performed by: NURSE PRACTITIONER

## 2022-08-12 PROCEDURE — 3008F PR BODY MASS INDEX (BMI) DOCUMENTED: ICD-10-PCS | Mod: CPTII,,, | Performed by: NURSE PRACTITIONER

## 2022-08-12 PROCEDURE — 3044F HG A1C LEVEL LT 7.0%: CPT | Mod: CPTII,,, | Performed by: NURSE PRACTITIONER

## 2022-08-12 PROCEDURE — 3074F PR MOST RECENT SYSTOLIC BLOOD PRESSURE < 130 MM HG: ICD-10-PCS | Mod: CPTII,,, | Performed by: NURSE PRACTITIONER

## 2022-08-12 PROCEDURE — 99212 PR OFFICE/OUTPT VISIT, EST, LEVL II, 10-19 MIN: ICD-10-PCS | Mod: S$PBB,,, | Performed by: NURSE PRACTITIONER

## 2022-08-12 PROCEDURE — 3074F SYST BP LT 130 MM HG: CPT | Mod: CPTII,,, | Performed by: NURSE PRACTITIONER

## 2022-08-12 PROCEDURE — 99999 PR PBB SHADOW E&M-EST. PATIENT-LVL III: CPT | Mod: PBBFAC,,, | Performed by: NURSE PRACTITIONER

## 2022-08-12 RX ORDER — METHOCARBAMOL 750 MG/1
TABLET, FILM COATED ORAL
COMMUNITY
End: 2023-02-08

## 2022-08-12 RX ORDER — CLONIDINE 0.2 MG/24H
PATCH, EXTENDED RELEASE TRANSDERMAL
COMMUNITY
End: 2023-09-19

## 2022-08-12 RX ORDER — AMITRIPTYLINE HYDROCHLORIDE 25 MG/1
TABLET, FILM COATED ORAL
COMMUNITY
End: 2023-07-05 | Stop reason: SDUPTHER

## 2022-08-12 RX ORDER — POTASSIUM CHLORIDE 600 MG/1
1 TABLET, FILM COATED, EXTENDED RELEASE ORAL DAILY
COMMUNITY
End: 2023-04-13

## 2022-08-12 NOTE — TELEPHONE ENCOUNTER
----- Message from Promise Malloy MA sent at 8/12/2022  3:09 PM CDT -----  Regarding: FW: advise  Contact: PT @ [phone]    ----- Message -----  From: Maranda Diop  Sent: 8/12/2022   2:12 PM CDT  To: Radames Willard Staff  Subject: advise                                           Pt is calling to speak to someone in Dr. Crum office to discuss his pain level. Pt has advised me that he went to the hospital in [place], 2 weeks ago. Pt states that his pain is getting worse and he would like a call back. Pt states that he is very concern. Please call. Thanks.

## 2022-08-12 NOTE — PROGRESS NOTES
Subjective:       Patient ID: Haseeb Reilly is a 51 y.o. male.    Chief Complaint: Follow-up    Mr. Haseeb Reilly is a 51 year old male, new to me, presents to the clinic for follow up . PCP is Aman Patino.  Medical and surgical history in addition to problem list reviewed as listed below.    Presents for follow up polyneuropathy, being worked up by Neurology, complains of numbness and tingling in upper bilateral extremities.     Erectile Dysfunction  This is a new problem. The current episode started 1 to 4 weeks ago. The problem is unchanged. The nature of his difficulty is achieving erection and maintaining erection. Non-physiologic factors contributing to erectile dysfunction are anxiety. He reports his erection duration to be less than 1 minute. Nothing aggravates the symptoms. Past treatments include nothing.       Past Medical History:   Diagnosis Date    Diabetes mellitus     Hypertension         History reviewed. No pertinent surgical history.     History reviewed. No pertinent family history.    Social History     Tobacco Use   Smoking Status Never Smoker   Smokeless Tobacco Never Used       Social History     Social History Narrative    Not on file       Review of patient's allergies indicates:   Allergen Reactions    Morphine         Review of Systems   HENT: Negative.    Respiratory: Negative.    Cardiovascular: Negative.    Skin: Negative.    Psychiatric/Behavioral: The patient is nervous/anxious.          Objective:        Vitals:    08/12/22 1320   BP: (!) 120/92   Pulse: 109        Physical Exam  Constitutional:       General: He is not in acute distress.     Appearance: He is well-developed.   HENT:      Head: Normocephalic and atraumatic.      Right Ear: External ear normal.      Left Ear: External ear normal.   Eyes:      General: No scleral icterus.     Extraocular Movements: Extraocular movements intact.      Conjunctiva/sclera: Conjunctivae normal.   Cardiovascular:      Rate and  Rhythm: Normal rate and regular rhythm.      Heart sounds: Normal heart sounds. No murmur heard.    No friction rub. No gallop.   Pulmonary:      Effort: Pulmonary effort is normal. No respiratory distress.      Breath sounds: Normal breath sounds. No wheezing or rales.   Musculoskeletal:      Cervical back: Normal range of motion.      Comments: Numbness and tingling to upper extremities.   Skin:     General: Skin is warm and dry.      Findings: No erythema or rash.   Neurological:      Mental Status: He is alert and oriented to person, place, and time.      Motor: No abnormal muscle tone.      Gait: Gait normal.   Psychiatric:         Behavior: Behavior normal.         Assessment:       1. Erectile dysfunction, unspecified erectile dysfunction type    2. Polyneuropathy        Plan:       Erectile dysfunction, unspecified erectile dysfunction type  -     Ambulatory referral/consult to Urology; Future; Expected date: 08/19/2022    Polyneuropathy       Medication List with Changes/Refills   Current Medications    AMITRIPTYLINE (ELAVIL) 25 MG TABLET    TAKE 1 TABLET    ATORVASTATIN (LIPITOR) 40 MG TABLET    Take 1 tablet (40 mg total) by mouth every evening. High cholesterol    BLOOD-GLUCOSE METER [facility]    Accu-Chek Guide Me Glucose Meter   CHECK BLOOD GLUCOSE TWICE DAILY FOR DIABETES    CARTIA  MG CP24    TAKE 1 CAPSULE BY MOUTH ONCE DAILY FOR HIGH BLOOD PRESSURE    CHOLECALCIFEROL, VITAMIN D3, 1,250 MCG (50,000 UNIT) CAPSULE    Take 50,000 Units by mouth once a week.    CLONIDINE 0.2 MG/24 HR TD PTWK (CATAPRES) 0.2 MG/24 HR    1 patch to skin    CYANOCOBALAMIN 1,000 MCG/ML INJECTION    Inject 1 mL (1,000 mcg total) into the muscle once a week for 30 days, THEN 1 mL (1,000 mcg total) every 30 days.    CYCLOBENZAPRINE (FLEXERIL) 10 MG TABLET    Take 1 tablet (10 mg total) by mouth 3 (three) times daily.    DULAGLUTIDE (TRULICITY) 3 MG/0.5 ML PEN INJECTOR    Inject 3 mg into the skin every 7 days.    DULOXETINE  "(CYMBALTA) 60 MG CAPSULE    Take 1 capsule (60 mg total) by mouth once daily.    EMPAGLIFLOZIN (JARDIANCE) 10 MG TABLET    Jardiance 10 mg tablet    FAMOTIDINE (PEPCID) 40 MG TABLET    Take 1 tablet (40 mg total) by mouth nightly as needed for Heartburn.    FUROSEMIDE (LASIX) 40 MG TABLET    TAKE 1 TABLET BY MOUTH ONCE DAILY FOR  WATER  PILL.    LANCETS (ACCU-CHEK FASTCLIX LANCET DRUM MISC)    Accu-Chek Fastclix Lancet Drum   USE TO CHECK GLUCOSE TWICE DAILY FOR DIABETES    LINACLOTIDE (LINZESS) 145 MCG CAP CAPSULE    TAKE 1 CAPSULE BY MOUTH ONCE DAILY FOR CONSTIPATION    LISINOPRIL-HYDROCHLOROTHIAZIDE (PRINZIDE,ZESTORETIC) 20-25 MG TAB    Take 1 tablet by mouth once daily. High blood pressure    METFORMIN (GLUCOPHAGE) 1000 MG TABLET    TAKE 1 TABLET BY MOUTH TWICE DAILY WITH MEALS    METHOCARBAMOL (ROBAXIN) 750 MG TAB    1 tablet    NAPROXEN (NAPROSYN) 500 MG TABLET    Take 1 tablet (500 mg total) by mouth 2 (two) times daily with meals.    PANTOPRAZOLE (PROTONIX) 40 MG TABLET    Take 1 tablet (40 mg total) by mouth once daily. heartburn    PEN NEEDLE, DIABETIC (BD ULTRA-FINE SHORT PEN NEEDLE) 31 GAUGE X 5/16" NDLE    USE 1 PEN NEEDLE TO INJECT INSULIN ONCE DAILY.    PEN NEEDLE, DIABETIC 31 GAUGE X 1/4" NDLE    pen needle, diabetic 31 gauge x 1/4"   USE 1 PEN NEEDLE WITH VICTOZA ONCE DAILY    PEN NEEDLE, DIABETIC 31 GAUGE X 5/16" NDLE    BD Ultra-Fine Short Pen Needle 31 gauge x 5/16"   USE 1 PEN NEEDLE TO INJECT INSULIN ONCE DAILY.    PEN NEEDLE, DIABETIC 31 GAUGE X 5/16" NDLE    BD Ultra-Fine Short Pen Needle 31 gauge x 5/16"   USE 1 PEN NEEDLE TO INJECT INSULIN ONCE DAILY    POLYETHYLENE GLYCOL (GLYCOLAX) 17 GRAM/DOSE POWDER    polyethylene glycol 3350 17 gram/dose oral powder    POTASSIUM CHLORIDE (KLOR-CON) 8 MEQ TBSR    Take 1 tablet by mouth once daily.    PROMETHAZINE (PHENERGAN) 25 MG TABLET    TAKE 1 TABLET BY MOUTH THREE TIMES DAILY AS NEEDED FOR NAUSEA    SUCRALFATE (CARAFATE) 1 GRAM TABLET    Take 1 " tablet (1 g total) by mouth 4 (four) times daily before meals and nightly. (lines the stomach)            Follow up if symptoms worsen or fail to improve.    Sally Garg APRN, MSN, FNP-C

## 2022-08-12 NOTE — TELEPHONE ENCOUNTER
Returned phone call to patient, left voicemail informing him that I will let Viktoriya GRADY know about increased pain, and advised him to be seen at ED or Urgent care if needed.

## 2022-08-12 NOTE — PATIENT INSTRUCTIONS
Unfortunately, the [facility] does not accept Medicaid for Bariatric Services. [facility] ([phone] or [phone]: Fax [phone]) and [place] ([phone] or [phone]: Fax [phone]) Bariatric Clinic accept Medicaid.

## 2022-08-15 NOTE — TELEPHONE ENCOUNTER
Called and spoke with the patient informed him that we can do a trial of gabapentin to help with his pain level. I also reviewed with the patient that controlling his blood sugar and vitamin B with the replenishment injections will aid in his pain.       Viktoriya Daniel PA-C

## 2022-08-16 ENCOUNTER — CLINICAL SUPPORT (OUTPATIENT)
Dept: REHABILITATION | Facility: HOSPITAL | Age: 52
End: 2022-08-16
Payer: MEDICAID

## 2022-08-16 DIAGNOSIS — M25.611 DECREASED ROM OF RIGHT SHOULDER: ICD-10-CM

## 2022-08-16 DIAGNOSIS — M25.511 CHRONIC RIGHT SHOULDER PAIN: ICD-10-CM

## 2022-08-16 DIAGNOSIS — R29.898 DECREASED STRENGTH OF UPPER EXTREMITY: ICD-10-CM

## 2022-08-16 DIAGNOSIS — R29.3 POOR POSTURE: ICD-10-CM

## 2022-08-16 DIAGNOSIS — G89.29 CHRONIC RIGHT SHOULDER PAIN: ICD-10-CM

## 2022-08-16 PROCEDURE — 97162 PT EVAL MOD COMPLEX 30 MIN: CPT | Mod: PN

## 2022-08-16 NOTE — PLAN OF CARE
[facility]  Physical Therapy Initial Evaluation    Name: [facility] Number: [id number]    Therapy Diagnosis:   Encounter Diagnoses   Name Primary?    Chronic right shoulder pain     Decreased ROM of right shoulder     Decreased strength of upper extremity     Poor posture      Physician: Aman Patino MD    Physician Orders: PT Eval only and submit Treat   Medical Diagnosis from Referral: M25.511,G89.29 (ICD-10-CM) - Chronic right shoulder pain  Evaluation Date: 8/16/2022  Authorization Period Expiration: 12/31/22  Plan of Care Expiration: 10/11/22  Visit # / Visits authorized: 1/ 1  FOTO: 1/10    Time In: 9:05  Time Out: 9:55  Total Billable Time: 50 minutes (mod Complexity Evaluation)    Precautions: Standard and Diabetes, hypertension, polyneuropathy,falls     Subjective   Date of onset: 3-4 months     History of current condition - [facility] reports: right shoulder pain ongoing 3-4 months. Pt reports several falls, no major injuries. Pt with hx of LBP and right hip and leg goes numb leading to fall when he tries to stand up. Most recent fall 3 weeks ago.  Hx of chronic neck pain with n/t bilaterally, followed by neurosurgery Dr. Crum for back and neck. He is very tired today he relates to gabapentin. Also had colonoscopy with anesthesia yesterday.  He has n/t in right hand with left sidelying.  He has had left hand numbness for 3 months as bilateral whole hand or finger tips. Pain radiates up back and shoulder into head causing headaches. He stopped taking pain medications due to affecting stomach. Pt with right shoulder pain with difficulty with lifting arm, cooking, cleaning, dressing, feeding dogs.  Pt denies clicking or popping in shoulder. Pt feels decreased  strength bilaterally and difficulty with fine motor tasks such as opening jar, buttoning, using screw, left > right . He is right hand dominant.     Medical History:   Past Medical History:    Diagnosis Date    Diabetes mellitus     Hypertension        Surgical History:   Haseeb Reilly  has no past surgical history on file.    Medications:   Haseeb has a current medication list which includes the following prescription(s): amitriptyline, atorvastatin, blood-glucose meter, cartia xt, cholecalciferol (vitamin d3), clonidine 0.2 mg/24 hr td ptwk, cyanocobalamin, cyclobenzaprine, trulicity, duloxetine, jardiance, famotidine, furosemide, lancets, linaclotide, lisinopril-hydrochlorothiazide, metformin, methocarbamol, naproxen, pantoprazole, pen needle, diabetic, pen needle, diabetic, pen needle, diabetic, pen needle, diabetic, polyethylene glycol, potassium chloride, promethazine, and sucralfate.    Allergies:   Review of patient's allergies indicates:   Allergen Reactions    Morphine         Imaging, no new imaging in last year available in chart    Prior Therapy: PT for back   Social History:  lives with a cousin, no stairs  Occupation: not working, applying for disability, previously   Prior Level of Function: occasional rollator use, independent with ADLs,   Current Level of Function: assist with household chores, pain in right shoulder    Pain:   Current 6/10, worst 10/10, best 6/10   Location: right neck  and shoulder   Description: Aching, Dull, Numb and Sharp  Aggravating Factors: Lifting, Getting out of bed/chair and opening jar, overhead reaching, opening back   Easing Factors: gabapentin    Pts goals: improve function so he can live again and get back to work.     Objective   Pt very drowsy throughout session, low mood, slow answers     Posture: head tilt left, FHP, rounded/protracted shoulders. left foot external rotation in standing   Palpation: TTP right supraspinatus and UT   Sensation: decreased left sensation C 5-7  DTRs: 1+ reflex bilateral UEs   Coordination: min impaired finger to nose    strength 17lbs right 11lbs  left     Range of Motion/Strength:   CERVICAL AROM  Pain/Dysfunction with Movement   Flexion 42* Right neck pain all movements    Extension 30*    Right side bending 30*    Left side bending 25*    Right rotation 60*    Left rotation 50*      Shoulder Right Left Pain/Dysfunction with Movement   AROM/PROM      flexion  100*/150* 140/170 Neck pain > shoulder; end range shoulder pain    extension  45  55    abduction  100*/150* 145/170 Neck pain > shoulder; end range shoulder pain    Internal rotation  sacrum*/50*  T10/ WNL     External rotation Occiput*/85*  T2/ WNL       U/E MMT Right Left Pain/Dysfunction with Movement   Shoulder Flexion 4/5* 4+/5    Shoulder Extension 4/5 4/5    Shoulder Abduction 4/5* 4+/5    Shoulder IR 5/5 5/5    Shoulder ER   4+/5* 4+/5    Elbow Flexion  4+/5 4+/5    Elbow Extension 5/5 5/5    Wrist flex 4+/5 4+/5    Wrist ext  5/5 5/5      Joint Mobility:   -GHJ: decreased posterior mobility; significant relief with distraction    Special Tests:   Shoulder capsular pattern (for frozen shoulder): ER>Abd>IR limited: neg  Subacromial impingement test cluster: (+3/3 = +LR of 10.56, +2/3 = +LR of 5.03, 0/3 = -LR 0.17)  - Painful arc sign (pain at  deg range) = + right   - Infraspinatus Test = slight pain and weakness right   - Logan Erlin Test = + right   Full thickness rotator cuff tear test cluster: (+3/3 = +LR 15.6, +2/3 = +LR 3.6, 0/0 = -LR 0.16)  - Painful arc sign = + right   - Drop arm test = neg   - Infraspinatus test = slight pain and weakness right   Other:  - Neer's Test (rotator cuff pathology)= + right   - Speed's Test (long head of biceps, rotator cuff pathology) = + right   - Empty Can Test (supraspinatus, rotator cuff pathology) = + right   - Subscapularis Lift off Sign (rotator cuff pathology) = neg        CMS Impairment/Limitation/Restriction for FOTO shoulder Survey    Therapist reviewed FOTO scores for Haseeb Reilly on 8/16/2022.   FOTO documents entered into [employer] - see Media section.    Limitation Score:  58%  Category: Carrying         TREATMENT       Home Exercises Provided and Patient Education Provided   Clasped shoulder active assisted range of motion  scap squeeze     Education provided:    Anatomy and Pathology.   Symptom management and plan of care progressions.   Home Exercise Program.    Written Home Exercises Provided: yes.  Exercises were reviewed and Haseeb was able to demonstrate them prior to the end of the session.  Haseeb demonstrated good  understanding of the education provided.     See EMR under Patient Instructions for exercises provided 8/16/2022.      Assessment   Haseeb is a 51 y.o. male referred to outpatient Physical Therapy with a medical diagnosis of chronic right shoulder pain. Pt presents with right shoulder pain with past medical history significant for cervical and lumbar radiculopathy and falls leading to development of shoulder pain. Pt presents with decreased right shoulder range of motion, decreased upper extremity strength, impaired posture, decreased joint mobility of glenohumeral joint, decreased cervical range of motion, positive numbness and tingling bilaterally, decreased  and decreased functional mobility. Pts shoulder symptoms are consistent with right impingement syndrome. Cervical impairments appear to contribute to neck and shoulder pain and he may benefit from addressing cervical/thoracic mobility, deep cervical strength, and posture in addition to shoulder.  These impairments impact ability to dress, bathe, reach overhead, lift, carry, perform fine motor tasks and decrease quality of life. Pt with significant fatigue and drowsiness during today's evaluation secondary to high medication dosage and/or recovering from anesthesia from yesterday's colonoscopy. Recommend pt reach out to neurosurgeon for medication management if this continues to be an issue.     Pt prognosis is Fair.   Pt will benefit from skilled outpatient Physical Therapy to address the deficits  stated above and in the chart below, provide pt/family education, and to maximize pt's level of independence.     Plan of care discussed with patient: Yes  Pt's spiritual, cultural and educational needs considered and patient is agreeable to the plan of care and goals as stated below:     Anticipated Barriers for therapy: chronicity of neck and back pain, medication side effects    Medical Necessity is demonstrated by the following  History  Co-morbidities and personal factors that may impact the plan of care Co-morbidities:   diabetes, high BMI and HTN    Personal Factors:   age  lifestyle     moderate   Examination  Body Structures and Functions, activity limitations and participation restrictions that may impact the plan of care Body Regions:   neck  upper extremities    Body Systems:    ROM  strength  transfers  transitions  motor control    Participation Restrictions:   Overhead activities and ADLs     Activity limitations:   Learning and applying knowledge  no deficits    General Tasks and Commands  no deficits    Communication  no deficits    Mobility  lifting and carrying objects  driving (bike, car, motorcycle)    Self care  washing oneself (bathing, drying, washing hands)  dressing    Domestic Life  shopping  cooking  doing house work (cleaning house, washing dishes, laundry)    Interactions/Relationships  no deficits    Life Areas  no deficits    Community and Social Life  community life  recreation and leisure         moderate   Clinical Presentation evolving clinical presentation with changing clinical characteristics moderate   Decision Making/ Complexity Score: moderate       Goals:    Short Term Goals (4 Weeks):   1. Pt will be compliant with HEP to assist PT treatment in restoring pain free motion of the R shoulder.   2. Pt will improve impaired shoulder MMTs 1/2 grade B to improve strength for functional tasks.  3. Pt will improve right  shoulder flexion to >/= 20 deg to improve functional mobility  of UEs.  4. Pt will improve right  shoulder abduction to >/= 20 deg to improve functional mobility of UEs     Long Term Goals (8 Weeks):   1. Pt will improve FOTO score to </= 38% to demonstrate improvements in carrying, moving, and handling objects  2. Pt will improve impaired shoulder MMTs 1 grade B to improve strength for household duties.  3. Pt will improve right  shoulder flexion to >/= 150 deg to improve functional mobility of UEs to improve reach into cabinet   4. Pt will improve right  shoulder abduction to >/= 150 deg to improve functional mobility of UEs to improve dressing.      Plan   Plan of care Certification: 8/16/2022 to 10/11/22.    Outpatient Physical Therapy 2 times weekly for 8 weeks to include the following interventions: Cervical/Lumbar Traction, Electrical Stimulation  , Manual Therapy, Moist Heat/ Ice, Neuromuscular Re-ed, Patient Education, Therapeutic Activities and Therapeutic Exercise, ASTYM, Kinesiotaping PRN, Functional Dry Needling    ROSI HAGEN, PT, DPT

## 2022-08-17 ENCOUNTER — TELEPHONE (OUTPATIENT)
Dept: NEUROLOGY | Facility: CLINIC | Age: 52
End: 2022-08-17
Payer: MEDICAID

## 2022-08-17 NOTE — TELEPHONE ENCOUNTER
----- Message from Promise Mlaloy MA sent at 8/17/2022 10:37 AM CDT -----  Contact: Pt [phone]    ----- Message -----  From: Amari Cornejo  Sent: 8/17/2022  10:26 AM CDT  To: Radames Willard Staff    Pt called in regards to speaking with the nurse he has some concerns he states he may have had a light stroke please advise

## 2022-08-17 NOTE — TELEPHONE ENCOUNTER
"Returned phone call, patient states for several days he has been having intermittent episodes of Expressive Aphasia, STM, right sided shoulder, hip and lower back pain, and HA.  Patient stated, "it's on and off"  I recommended that patient be seen at ED, but patient states he will go if symptoms worsen, would like an appointment scheduled at our clinic.  First available is in November and he agreed to this and was also placed on the waiting list.  Routing this message to Dr. Crum and Viktoriya GRDAY to inform.   "

## 2022-08-18 PROBLEM — N52.9 ERECTILE DYSFUNCTION: Status: ACTIVE | Noted: 2022-08-18

## 2022-08-19 ENCOUNTER — PATIENT MESSAGE (OUTPATIENT)
Dept: NEUROLOGY | Facility: CLINIC | Age: 52
End: 2022-08-19
Payer: MEDICAID

## 2022-08-24 ENCOUNTER — HOSPITAL ENCOUNTER (EMERGENCY)
Facility: HOSPITAL | Age: 52
Discharge: HOME | End: 2022-08-24
Attending: EMERGENCY MEDICINE
Payer: MEDICAID

## 2022-08-24 ENCOUNTER — DOCUMENTATION ONLY (OUTPATIENT)
Dept: REHABILITATION | Facility: HOSPITAL | Age: 52
End: 2022-08-24
Payer: MEDICAID

## 2022-08-24 ENCOUNTER — CLINICAL SUPPORT (OUTPATIENT)
Dept: REHABILITATION | Facility: HOSPITAL | Age: 52
End: 2022-08-24
Payer: MEDICAID

## 2022-08-24 VITALS
HEART RATE: 72 BPM | OXYGEN SATURATION: 95 % | WEIGHT: 285 LBS | SYSTOLIC BLOOD PRESSURE: 140 MMHG | TEMPERATURE: 98 F | BODY MASS INDEX: 39.9 KG/M2 | DIASTOLIC BLOOD PRESSURE: 91 MMHG | HEIGHT: 71 IN | RESPIRATION RATE: 16 BRPM

## 2022-08-24 DIAGNOSIS — M25.611 DECREASED ROM OF RIGHT SHOULDER: Primary | ICD-10-CM

## 2022-08-24 DIAGNOSIS — R29.898 DECREASED STRENGTH OF UPPER EXTREMITY: ICD-10-CM

## 2022-08-24 DIAGNOSIS — G89.4 CHRONIC PAIN SYNDROME: Primary | ICD-10-CM

## 2022-08-24 DIAGNOSIS — R53.1 WEAKNESS: ICD-10-CM

## 2022-08-24 DIAGNOSIS — R29.3 POOR POSTURE: ICD-10-CM

## 2022-08-24 LAB
ALBUMIN SERPL BCP-MCNC: 3.4 G/DL (ref 3.5–5.2)
ALP SERPL-CCNC: 89 U/L (ref 55–135)
ALT SERPL W/O P-5'-P-CCNC: 14 U/L (ref 10–44)
ANION GAP SERPL CALC-SCNC: 8 MMOL/L (ref 8–16)
AST SERPL-CCNC: 16 U/L (ref 10–40)
BACTERIA #/AREA URNS AUTO: NORMAL /HPF
BASOPHILS # BLD AUTO: 0.05 K/UL (ref 0–0.2)
BASOPHILS NFR BLD: 0.9 % (ref 0–1.9)
BILIRUB SERPL-MCNC: 0.6 MG/DL (ref 0.1–1)
BILIRUB UR QL STRIP: NEGATIVE
BUN SERPL-MCNC: 10 MG/DL (ref 6–20)
CALCIUM SERPL-MCNC: 9.6 MG/DL (ref 8.7–10.5)
CHLORIDE SERPL-SCNC: 102 MMOL/L (ref 95–110)
CLARITY UR REFRACT.AUTO: CLEAR
CO2 SERPL-SCNC: 28 MMOL/L (ref 23–29)
COLOR UR AUTO: COLORLESS
CREAT SERPL-MCNC: 1 MG/DL (ref 0.5–1.4)
DIFFERENTIAL METHOD: ABNORMAL
EOSINOPHIL # BLD AUTO: 0.6 K/UL (ref 0–0.5)
EOSINOPHIL NFR BLD: 10.6 % (ref 0–8)
ERYTHROCYTE [DISTWIDTH] IN BLOOD BY AUTOMATED COUNT: 13.3 % (ref 11.5–14.5)
EST. GFR  (NO RACE VARIABLE): >60 ML/MIN/1.73 M^2
GLUCOSE SERPL-MCNC: 80 MG/DL (ref 70–110)
GLUCOSE UR QL STRIP: ABNORMAL
HCT VFR BLD AUTO: 42.9 % (ref 40–54)
HGB BLD-MCNC: 14.3 G/DL (ref 14–18)
HGB UR QL STRIP: NEGATIVE
IMM GRANULOCYTES # BLD AUTO: 0.01 K/UL (ref 0–0.04)
IMM GRANULOCYTES NFR BLD AUTO: 0.2 % (ref 0–0.5)
KETONES UR QL STRIP: NEGATIVE
LEUKOCYTE ESTERASE UR QL STRIP: NEGATIVE
LYMPHOCYTES # BLD AUTO: 2.5 K/UL (ref 1–4.8)
LYMPHOCYTES NFR BLD: 46.2 % (ref 18–48)
MCH RBC QN AUTO: 29.5 PG (ref 27–31)
MCHC RBC AUTO-ENTMCNC: 33.3 G/DL (ref 32–36)
MCV RBC AUTO: 89 FL (ref 82–98)
MICROSCOPIC COMMENT: NORMAL
MONOCYTES # BLD AUTO: 0.5 K/UL (ref 0.3–1)
MONOCYTES NFR BLD: 9.6 % (ref 4–15)
NEUTROPHILS # BLD AUTO: 1.8 K/UL (ref 1.8–7.7)
NEUTROPHILS NFR BLD: 32.5 % (ref 38–73)
NITRITE UR QL STRIP: NEGATIVE
NRBC BLD-RTO: 0 /100 WBC
PH UR STRIP: 5 [PH] (ref 5–8)
PLATELET # BLD AUTO: 429 K/UL (ref 150–450)
PMV BLD AUTO: 8.9 FL (ref 9.2–12.9)
POTASSIUM SERPL-SCNC: 3.8 MMOL/L (ref 3.5–5.1)
PROT SERPL-MCNC: 7.8 G/DL (ref 6–8.4)
PROT UR QL STRIP: NEGATIVE
RBC # BLD AUTO: 4.84 M/UL (ref 4.6–6.2)
RBC #/AREA URNS AUTO: 1 /HPF (ref 0–4)
SODIUM SERPL-SCNC: 138 MMOL/L (ref 136–145)
SP GR UR STRIP: 1.01 (ref 1–1.03)
SQUAMOUS #/AREA URNS AUTO: 2 /HPF
URN SPEC COLLECT METH UR: ABNORMAL
WBC # BLD AUTO: 5.39 K/UL (ref 3.9–12.7)
WBC #/AREA URNS AUTO: 2 /HPF (ref 0–5)
YEAST UR QL AUTO: NORMAL

## 2022-08-24 PROCEDURE — 93010 ELECTROCARDIOGRAM REPORT: CPT | Mod: ,,, | Performed by: INTERNAL MEDICINE

## 2022-08-24 PROCEDURE — 97110 THERAPEUTIC EXERCISES: CPT | Mod: PN,CQ

## 2022-08-24 PROCEDURE — 93005 ELECTROCARDIOGRAM TRACING: CPT

## 2022-08-24 PROCEDURE — 81001 URINALYSIS AUTO W/SCOPE: CPT | Performed by: EMERGENCY MEDICINE

## 2022-08-24 PROCEDURE — 85025 COMPLETE CBC W/AUTO DIFF WBC: CPT | Performed by: EMERGENCY MEDICINE

## 2022-08-24 PROCEDURE — 96374 THER/PROPH/DIAG INJ IV PUSH: CPT

## 2022-08-24 PROCEDURE — 93010 EKG 12-LEAD: ICD-10-PCS | Mod: ,,, | Performed by: INTERNAL MEDICINE

## 2022-08-24 PROCEDURE — 99284 PR EMERGENCY DEPT VISIT,LEVEL IV: ICD-10-PCS | Mod: ,,, | Performed by: EMERGENCY MEDICINE

## 2022-08-24 PROCEDURE — 99284 EMERGENCY DEPT VISIT MOD MDM: CPT | Mod: ,,, | Performed by: EMERGENCY MEDICINE

## 2022-08-24 PROCEDURE — 99285 EMERGENCY DEPT VISIT HI MDM: CPT | Mod: 25

## 2022-08-24 PROCEDURE — 63600175 PHARM REV CODE 636 W HCPCS: Performed by: EMERGENCY MEDICINE

## 2022-08-24 PROCEDURE — 80053 COMPREHEN METABOLIC PANEL: CPT | Performed by: EMERGENCY MEDICINE

## 2022-08-24 RX ORDER — KETOROLAC TROMETHAMINE 30 MG/ML
15 INJECTION, SOLUTION INTRAMUSCULAR; INTRAVENOUS
Status: COMPLETED | OUTPATIENT
Start: 2022-08-24 | End: 2022-08-24

## 2022-08-24 RX ADMIN — KETOROLAC TROMETHAMINE 15 MG: 30 INJECTION, SOLUTION INTRAMUSCULAR; INTRAVENOUS at 08:08

## 2022-08-24 NOTE — ED TRIAGE NOTES
The pt is a 51-year-old male who presents to the ED. Pt went to [place] three weeks ago due to right-sided pain. Pts chief complaint right sided sharp and stabbing pain primarily at the neck and right shoulder. Pt reports trouble sleeping due to pain.

## 2022-08-24 NOTE — PROGRESS NOTES
"Pt presented to therapy for first follow up visit with ongoing 10/10 shoulder pain and neck pain. Neck pain described as a sharp kink in neck. Pt continues to be very drowsy with slow but fluent speech without signs of aphasia. Pt relates drowsiness to medication use. Per chart, neurologist Dr. Gaston  recommended ED evaluation on 8/19/22 for aphasia and pt states he has been "forgetting to go." Pt with new information stating week prior to initial PT evaluation on 8/18/22 he was having complaints of aphasia with difficulty speaking, finding work, and forgetting with additional complaints of right side weakness which he was evaluated in ED in [place] but pt states he was told his pain was too high to do anything.  Hx of cervical and lumbar radiculopathy of right side as well. Shortened PT treatment performed by Rosa Singh PTA following discussion with focus on gentle shoulder range of motion as no emergent symptoms noted but recommended pt seek evaluation in ED post treatment. Pt agreed.     ROSI HAGEN, PT          "

## 2022-08-24 NOTE — PROGRESS NOTES
"[facility]   Physical Therapy Treatment Note     Name: Haseeb [facility] Number: [id number]    Therapy Diagnosis:   Encounter Diagnoses   Name Primary?    Decreased ROM of right shoulder Yes    Decreased strength of upper extremity     Poor posture      Physician: No ref. provider found    Visit Date: 8/24/2022    Physician Orders: PT Eval only and submit Treat   Medical Diagnosis from Referral: M25.511,G89.29 (ICD-10-CM) - Chronic right shoulder pain  Evaluation Date: 8/16/2022  Authorization Period Expiration: 12/31/22  Plan of Care Expiration: 10/11/22  Visit # / Visits authorized: 2/ 1  FOTO: 2/10    PTA Visit #: 1/5     Time In: 12:00  Time Out: 12:30  Total Billable Time: 30 minutes (2TE)    Precautions: Standard and Diabetes, hypertension, polyneuropathy,falls     SUBJECTIVE     Pt reports: 10/10 pain on right side of body but especially in his neck and shoulder. Reports that doctor advised him to go to Urgent care for recent possible stroke symptoms 3 weeks ago but he has been forgetting.   He was compliant with home exercise program.  Response to previous treatment: eval only  Functional change: Ongoing    Pain: 10/10  Location: right neck and shoulder     OBJECTIVE     Objective Measures updated at progress report unless specified.     Treatment     Haseeb received the treatments listed below:      therapeutic exercises to develop strength, endurance, ROM, flexibility, posture and core stabilization for 15 minutes including:  Scapular retractions 5" x 20  Supine shoulder flexion 2 x 10  Upper trap stretch 5" x 10  Bilateral shoulder ER x 10 yellow band    manual therapy techniques: Joint mobilizations, Myofacial release and Soft tissue Mobilization were applied to the: Right shoulder for 15 minutes, including:  Gentle shoulder oscillations   J mobs grade I-II  Shoulder Passive range of motion in all planes to patients tolerance    Patient Education and Home Exercises " "    Home Exercises Provided and Patient Education Provided     Education provided:   · Anatomy and Pathology.  · Symptom management and plan of care progressions.  · Home Exercise Program.    Written Home Exercises Provided: Patient instructed to cont prior HEP. Exercises were reviewed and Haseeb was able to demonstrate them prior to the end of the session.  Haseeb demonstrated good  understanding of the education provided. See EMR under Patient Instructions for exercises provided during therapy sessions    ASSESSMENT     Haseeb is a 51 y.o. male referred to outpatient Physical Therapy with a medical diagnosis of chronic right shoulder pain. Patient presents to clinic for first follow up appointment. Reports that since initial evaluation he has been in contact with MD over possible stroke symptoms. Patient was advised to go to ED on 8/19/2022 but has not gone stating "I've been forgetting". No immediate symptoms to cause concern of stroke noted by Hilaria Farris DPT (see note for full assessment) but patient in agreement to shorted PT session to go to emergency department as MD advised. Able to complete shortened session with fairly good tolerance although moderate drowsiness that he attributes to pain medication. Patient reports feeling better post session decreasing pain to 7/10. Patient reports that he feels well enough to drive himself to ED. Follow up on recent ED visit.    Haseeb Is progressing well towards his goals.   Pt prognosis is Fair.     Pt will continue to benefit from skilled outpatient physical therapy to address the deficits listed in the problem list box on initial evaluation, provide pt/family education and to maximize pt's level of independence in the home and community environment.     Pt's spiritual, cultural and educational needs considered and pt agreeable to plan of care and goals.     Anticipated barriers to physical therapy: chronicity of neck and back pain, medication side " effects    Goals:     Short Term Goals (4 Weeks):   1. Pt will be compliant with HEP to assist PT treatment in restoring pain free motion of the R shoulder. (Ongoing, not met)  2. Pt will improve impaired shoulder MMTs 1/2 grade B to improve strength for functional tasks. (Ongoing, not met)  3. Pt will improve right  shoulder flexion to >/= 20 deg to improve functional mobility of UEs. (Ongoing, not met)  4. Pt will improve right  shoulder abduction to >/= 20 deg to improve functional mobility of UEs (Ongoing, not met)     Long Term Goals (8 Weeks):   1. Pt will improve FOTO score to </= 38% to demonstrate improvements in carrying, moving, and handling objects (Ongoing, not met)  2. Pt will improve impaired shoulder MMTs 1 grade B to improve strength for household duties. (Ongoing, not met)  3. Pt will improve right  shoulder flexion to >/= 150 deg to improve functional mobility of UEs to improve reach into cabinet (Ongoing, not met)  4. Pt will improve right  shoulder abduction to >/= 150 deg to improve functional mobility of UEs to improve dressing. (Ongoing, not met)    PLAN     Plan of care Certification: 8/16/2022 to 10/11/22.  Cont with treatment per POC; Follow up on recent ED visit    Azul Singh, PTA

## 2022-08-24 NOTE — ED PROVIDER NOTES
Encounter Date: 8/24/2022       History     Chief Complaint   Patient presents with    Facial Droop     Pt c/o right facial droop, right arm weakness and neck/head pain x 3wks. Pt states he went to [place] ED and was told they couldn't do anything for him.      HPI   Haseeb Reilly is a 51-year-old male with a history of diabetes and hypertension presenting with right facial droop, right arm weakness and right lower extremity weakness for 3 weeks.  States he has been having associated neck and head pain for 3 weeks.  He was seen at [facility] emergency department 3 weeks ago, and was told that they could not do anything form.  He denies any associated fevers, chills, nausea, vomiting, diarrhea, back pain, chest pain, lightheadedness, pleuritic pain, left-sided weakness, falls or trauma.  He was seen in the emergency department at outside hospital on 07/22 with chronic neck pain and cervical radiculopathy.  At that time he was discharged with plans for follow-up with a chronic pain physician for chronic pain symptoms.  He had a negative workup at that time.  Patient is here with new weakness for 3 weeks including right arm weakness, right leg weakness and right-sided facial droop.  None of these findings are currently present on my evaluation.  He denies any visual changes, slurred speech, dysarthria or aphasia.    Review of patient's allergies indicates:   Allergen Reactions    Morphine      Past Medical History:   Diagnosis Date    Diabetes mellitus     Hypertension      No past surgical history on file.  No family history on file.  Social History     Tobacco Use    Smoking status: Never Smoker    Smokeless tobacco: Never Used   Substance Use Topics    Alcohol use: No    Drug use: No     Review of Systems   Constitutional: Negative for chills, fatigue and fever.   HENT: Negative for congestion and sore throat.    Eyes: Negative for photophobia and visual disturbance.   Respiratory: Negative for  chest tightness and shortness of breath.    Cardiovascular: Negative for chest pain, palpitations and leg swelling.   Gastrointestinal: Negative for abdominal pain, nausea and vomiting.   Endocrine: Negative for polyphagia and polyuria.   Genitourinary: Negative for flank pain and frequency.   Musculoskeletal: Positive for back pain, gait problem, myalgias and neck pain.   Skin: Negative for color change and wound.   Neurological: Positive for facial asymmetry and weakness. Negative for speech difficulty and light-headedness.   Psychiatric/Behavioral: Negative for confusion. The patient is not nervous/anxious.        Physical Exam     Initial Vitals [08/24/22 1319]   BP Pulse Resp Temp SpO2   138/87 88 20 97.7 °F (36.5 °C) 98 %      MAP       --         Physical Exam    Nursing note and vitals reviewed.      Gen/Constitutional: Interactive. No acute distress  Head: Normocephalic, Atraumatic  Neck: supple, no masses or LAD, no JVD  Eyes: PERRLA, conjunctiva clear  Ears, Nose and Throat: No rhinorrhea or stridor.  Cardiac:  Regular rate, Reg Rhythm, No murmur  Pulmonary: CTA Bilat, no wheezes, rhonchi, rales.  No increased work of breathing.  GI: Abdomen soft, non-tender, non-distended; no rebound or guarding  : No CVA tenderness.  Musculoskeletal: Extremities warm, well perfused, no erythema, no edema  Skin: No rashes, cyanosis or jaundice.  Neuro: Alert and Oriented x 3; No focal motor or sensory deficits.  Full range of motion upper and lower extremities, 2+ distal pulses, sensory intact to light touch, 5/5 strength upper and lower extremity, no truncal ataxia, steady gait, negative Romberg  Psych: Normal affect      ED Course   Procedures  Labs Reviewed   CBC W/ AUTO DIFFERENTIAL - Abnormal; Notable for the following components:       Result Value    MPV 8.9 (*)     Eos # 0.6 (*)     Gran % 32.5 (*)     Eosinophil % 10.6 (*)     All other components within normal limits   COMPREHENSIVE METABOLIC PANEL -  Abnormal; Notable for the following components:    Albumin 3.4 (*)     All other components within normal limits   URINALYSIS, REFLEX TO URINE CULTURE - Abnormal; Notable for the following components:    Color, UA Colorless (*)     Glucose, UA 4+ (*)     All other components within normal limits    Narrative:     Specimen Source->Urine   URINALYSIS MICROSCOPIC    Narrative:     Specimen Source->Urine     EKG Readings: (Independently Interpreted)   Initial Reading: No STEMI. Previous EKG: Compared with most recent EKG Rhythm: Normal Sinus Rhythm. Heart Rate: 73. Ectopy: No Ectopy. Conduction: Normal. ST Segments: Normal ST Segments.       Imaging Results          MRI Brain Without Contrast (Final result)  Result time 08/24/22 18:52:03    Final result by Adria Pinon MD (08/24/22 18:52:03)                 Impression:      No acute intracranial abnormality.    Suspected sequela of minimal chronic microvascular ischemic change.      Electronically signed by: Adria Pinon MD  Date:    08/24/2022  Time:    18:52             Narrative:    EXAMINATION:  MRI BRAIN WITHOUT CONTRAST    CLINICAL HISTORY:  Stroke, follow up;.    TECHNIQUE:  Multiplanar multisequence MR imaging of the brain was performed without contrast.    COMPARISON:  MRI cervical spine 09/10/2021    FINDINGS:  Intracranial compartment: Brain appears normally formed.    Ventricles and sulci are normal in size for age without evidence of hydrocephalus. No extra-axial blood or fluid collections.    Minimal nonspecific T2/FLAIR hyperintense signal of the periventricular and subcortical supratentorial white matter, likely sequela of chronic microvascular ischemic change in this age group.  No mass lesion, acute hemorrhage, edema or acute infarct.    Normal vascular flow voids are preserved.  The sella and parasellar regions are within normal limits.    Skull/extracranial contents (limited evaluation): Bone marrow signal intensity is normal.  Craniocervical  junction is within normal limits.  Mucosal thickening within a posterior right ethmoidal air cell.  Right-sided pedro bullosa noted.  Bilateral orbits are within normal limits.                              X-Rays:   Independently Interpreted Readings:   Other Readings:  MRI brain:  No acute stroke, intracranial hemorrhage or acute findings on my read    Medications   ketorolac injection 15 mg (15 mg Intravenous Given 8/24/22 2036)     Medical Decision Making:   History:   Old Medical Records: I decided to obtain old medical records.  Initial Assessment:   Haseeb Reilly is a 51-year-old male with a history of diabetes and hypertension presenting with right facial droop, right arm weakness and right lower extremity weakness for 3 weeks.   Differential Diagnosis:   CVA, TIA, radiculopathy, cervical stenosis, metabolic encephalopathy, malingering, chronic pain, pain seeking, electrolyte abnormality, dehydration  Independently Interpreted Test(s):   I have ordered and independently interpreted X-rays - see prior notes.  I have ordered and independently interpreted EKG Reading(s) - see prior notes  Clinical Tests:   Lab Tests: Ordered and Reviewed  Radiological Study: Ordered and Reviewed  Medical Tests: Ordered and Reviewed    Emergent evaluation of patient presenting with generalized weakness but more so on the right side associated with facial droop, right arm weakness and right lower extremity weakness that occurred 3 weeks ago.  He is currently afebrile vital signs are stable.  Physical exam findings unremarkable with no focal deficits, no sensory deficits, negative truncal ataxia, negative Romberg, no gait instability.  He does have chronic pain with radiculopathy that he has had for some time.  5/5 strength.  He states that he has been referred to pain management and has a pain physician.  He is requesting treatment for his pain, I have offered him Toradol his he has an allergy to morphine at this time.  Given  his history of weakness, MRI brain was obtained, no acute findings on my read or after radiology final read.  ECG without ischemia or STEMI on my read.  Labs unremarkable for leukocytosis, abnormal chemistry, LFTs or acidosis.  UA without evidence of UTI.  Recommend close follow-up with PCP including chronic pain medication doctor for any worsening symptoms.  Recommend physical therapy for cervical radiculopathy and outpatient follow-up.  No high risk features today to suggest emergent pathology including CVA, metabolic encephalopathy, electrolyte abnormality, or neurologic deficits. Patient agreeable to discharge plan. Strict ED precautions and return instructions discussed at length and patient verbalized understanding. All questions were answered and ample time was given for questions.      Complexity: High - level 5                      Clinical Impression:   Final diagnoses:  [R53.1] Weakness  [G89.4] Chronic pain syndrome (Primary)          ED Disposition Condition    Discharge Stable        ED Prescriptions     None        Follow-up Information     Follow up With Specialties Details Why Contact Info Additional Information    Aman Patino MD Family Medicine Schedule an appointment as soon as possible for a visit in 1 week  [address] [facility] [address]  [phone]       [facility] Pain Medicine Schedule an appointment as soon as possible for a visit in 1 week As needed, If symptoms worsen [address] [facility] [id number] [phone] [facility] Management [facility] - [facility], 9th Floor, [address] Please park in [place] and use [place] elevators         Minor French DO, FAAEM  Emergency Staff Physician   Dept of Emergency Medicine   [facility]  Spectralink: [id number]        Disclaimer: This note has been generated using voice-recognition software. There may be typographical errors that have been missed during proof-reading.       Minor SHARIF  DO Manolo  08/25/22 0924

## 2022-08-24 NOTE — ED NOTES
Pt ambulated to bathroom to provide a urine sample, tolerated well. Patient resting in stretcher and is in NAD at this time. Pt is awake alert and oriented x4, VSS, respirations even and unlabored. Pt updated on plan of care. Bed low and locked with side rails up x2. Pt voices no needs at this time.  Will continue to monitor.

## 2022-08-24 NOTE — FIRST PROVIDER EVALUATION
"Medical screening exam completed.  I have conducted a focused provider triage encounter, findings are as follows:    Brief history of present illness: 51 M hx of sciatica, diabetes, hypertension, hyperlipidemia presenting today with multiple complaints.  States he has had right-sided facial droop, difficulty finding his words and right sided tingling for the past 3 weeks, has been constant.  He tried to make an appointment with neurology who referred him to the ED for evaluation.    Vitals:    08/24/22 1319   BP: 138/87   Pulse: 88   Resp: 20   Temp: 97.7 °F (36.5 °C)   SpO2: 98%   Weight: 129.3 kg (285 lb)   Height: 5' 11" (1.803 m)       Pertinent physical exam:  Slight flattening of the right nasal labial fold, clear speech.  Strength equal    Brief workup plan:  Labs, defer imaging to Ed provider      Preliminary workup initiated; this workup will be continued and followed by the physician or advanced practice provider that is assigned to the patient when roomed.  "

## 2022-08-24 NOTE — ED NOTES
Patient identifiers for Haseeb Reilly 51 y.o. male checked and correct.  Chief Complaint   Patient presents with    Facial Droop     Pt c/o right facial droop, right arm weakness and neck/head pain x 3wks. Pt states he went to [place] ED and was told they couldn't do anything for him.      Past Medical History:   Diagnosis Date    Diabetes mellitus     Hypertension      Allergies reported:   Review of patient's allergies indicates:   Allergen Reactions    Morphine          LOC: Patient is awake, alert, and aware of environment with an appropriate affect. Patient is oriented x 4 and speaking appropriately.  APPEARANCE: Patient resting comfortably and in no acute distress. Patient is clean and well groomed, patient's clothing is properly fastened.  HEENT: Pt presents with surgical mask on.   SKIN: The skin is warm and dry. Patient has normal skin turgor and moist mucus membranes.   MUSKULOSKELETAL: Patient is moving all extremities well with the exception of the right upper and lower extremities. The pt reports pain and weakness to the right side. No obvious deformities noted. Pulses intact.   RESPIRATORY: Airway is open and patent. Respirations are spontaneous and non-labored with normal effort and rate.  CARDIAC: Patient has a normal rate and rhythm. 88 on cardiac monitor. No peripheral edema noted.   ABDOMEN: No distention noted. Soft and non-tender upon palpation.  NEUROLOGICAL: pupils 3mm, PERRL. Facial expression is symmetrical. Hand grasps are equal bilaterally. Normal sensation in all extremities when touched with finger with the exception of the left hand, pt reports numbness

## 2022-08-25 ENCOUNTER — PATIENT OUTREACH (OUTPATIENT)
Dept: EMERGENCY MEDICINE | Facility: HOSPITAL | Age: 52
End: 2022-08-25
Payer: MEDICAID

## 2022-08-25 ENCOUNTER — TELEPHONE (OUTPATIENT)
Dept: PRIMARY CARE CLINIC | Facility: CLINIC | Age: 52
End: 2022-08-25
Payer: MEDICAID

## 2022-08-25 NOTE — ED NOTES
Pt family member (Farideh) called for an update, pt provided consent for hospital staff to speak with family member, family member requests an update if pt is admitted to hosp.

## 2022-08-25 NOTE — TELEPHONE ENCOUNTER
----- Message from Ivory Fragoso sent at 8/25/2022 12:26 PM CDT -----  Contact: pt [phone]  Patient is requesting a call back from nurse regarding paperwork for his , pls advise

## 2022-08-25 NOTE — DISCHARGE INSTRUCTIONS
Today, your evaluation for weakness included today labs, full workup and MRI of your brain.  There was no signs of stroke.  We recommend follow-up with your PCP and chronic pain physician for your chronic pain syndrome.  If you do not have a pain syndrome physician, you may call the phone number above.  Please use the medications prescribed to you.    Our goal in the emergency department is to always give you outstanding care and exceptional service. You may receive a survey by mail or e-mail in the next week regarding your experience in our ED. We would greatly appreciate your completing and returning the survey. Your feedback provides us with a way to recognize our staff who give very good care and it helps us learn how to improve when your experience was below our aspiration of excellence.

## 2022-08-26 ENCOUNTER — TELEPHONE (OUTPATIENT)
Dept: REHABILITATION | Facility: HOSPITAL | Age: 52
End: 2022-08-26
Payer: MEDICAID

## 2022-08-31 ENCOUNTER — OFFICE VISIT (OUTPATIENT)
Dept: PRIMARY CARE CLINIC | Facility: CLINIC | Age: 52
End: 2022-08-31
Payer: MEDICAID

## 2022-08-31 ENCOUNTER — CLINICAL SUPPORT (OUTPATIENT)
Dept: REHABILITATION | Facility: HOSPITAL | Age: 52
End: 2022-08-31
Payer: MEDICAID

## 2022-08-31 VITALS
DIASTOLIC BLOOD PRESSURE: 76 MMHG | SYSTOLIC BLOOD PRESSURE: 102 MMHG | HEART RATE: 85 BPM | WEIGHT: 281.5 LBS | OXYGEN SATURATION: 98 % | BODY MASS INDEX: 39.41 KG/M2 | HEIGHT: 71 IN

## 2022-08-31 DIAGNOSIS — G89.29 CHRONIC RIGHT SHOULDER PAIN: ICD-10-CM

## 2022-08-31 DIAGNOSIS — M25.511 CHRONIC RIGHT SHOULDER PAIN: ICD-10-CM

## 2022-08-31 DIAGNOSIS — G89.4 CHRONIC PAIN SYNDROME: Primary | ICD-10-CM

## 2022-08-31 DIAGNOSIS — R29.898 RIGHT HAND WEAKNESS: ICD-10-CM

## 2022-08-31 DIAGNOSIS — M51.37 DDD (DEGENERATIVE DISC DISEASE), LUMBOSACRAL: ICD-10-CM

## 2022-08-31 DIAGNOSIS — R29.3 POOR POSTURE: ICD-10-CM

## 2022-08-31 DIAGNOSIS — G62.9 POLYNEUROPATHY: ICD-10-CM

## 2022-08-31 DIAGNOSIS — M25.611 DECREASED ROM OF RIGHT SHOULDER: Primary | ICD-10-CM

## 2022-08-31 DIAGNOSIS — R29.898 DECREASED STRENGTH OF UPPER EXTREMITY: ICD-10-CM

## 2022-08-31 PROCEDURE — 3044F HG A1C LEVEL LT 7.0%: CPT | Mod: CPTII,,, | Performed by: FAMILY MEDICINE

## 2022-08-31 PROCEDURE — 3078F DIAST BP <80 MM HG: CPT | Mod: CPTII,,, | Performed by: FAMILY MEDICINE

## 2022-08-31 PROCEDURE — 3008F BODY MASS INDEX DOCD: CPT | Mod: CPTII,,, | Performed by: FAMILY MEDICINE

## 2022-08-31 PROCEDURE — 3008F PR BODY MASS INDEX (BMI) DOCUMENTED: ICD-10-PCS | Mod: CPTII,,, | Performed by: FAMILY MEDICINE

## 2022-08-31 PROCEDURE — 97110 THERAPEUTIC EXERCISES: CPT | Mod: PN

## 2022-08-31 PROCEDURE — 3074F SYST BP LT 130 MM HG: CPT | Mod: CPTII,,, | Performed by: FAMILY MEDICINE

## 2022-08-31 PROCEDURE — 99215 OFFICE O/P EST HI 40 MIN: CPT | Mod: PBBFAC,PN | Performed by: FAMILY MEDICINE

## 2022-08-31 PROCEDURE — 97140 MANUAL THERAPY 1/> REGIONS: CPT | Mod: PN

## 2022-08-31 PROCEDURE — 99214 PR OFFICE/OUTPT VISIT, EST, LEVL IV, 30-39 MIN: ICD-10-PCS | Mod: S$PBB,,, | Performed by: FAMILY MEDICINE

## 2022-08-31 PROCEDURE — 1160F RVW MEDS BY RX/DR IN RCRD: CPT | Mod: CPTII,,, | Performed by: FAMILY MEDICINE

## 2022-08-31 PROCEDURE — 4010F PR ACE/ARB THEARPY RXD/TAKEN: ICD-10-PCS | Mod: CPTII,,, | Performed by: FAMILY MEDICINE

## 2022-08-31 PROCEDURE — 99214 OFFICE O/P EST MOD 30 MIN: CPT | Mod: S$PBB,,, | Performed by: FAMILY MEDICINE

## 2022-08-31 PROCEDURE — 1159F PR MEDICATION LIST DOCUMENTED IN MEDICAL RECORD: ICD-10-PCS | Mod: CPTII,,, | Performed by: FAMILY MEDICINE

## 2022-08-31 PROCEDURE — 99999 PR PBB SHADOW E&M-EST. PATIENT-LVL V: CPT | Mod: PBBFAC,,, | Performed by: FAMILY MEDICINE

## 2022-08-31 PROCEDURE — 1159F MED LIST DOCD IN RCRD: CPT | Mod: CPTII,,, | Performed by: FAMILY MEDICINE

## 2022-08-31 PROCEDURE — 99999 PR PBB SHADOW E&M-EST. PATIENT-LVL V: ICD-10-PCS | Mod: PBBFAC,,, | Performed by: FAMILY MEDICINE

## 2022-08-31 PROCEDURE — 4010F ACE/ARB THERAPY RXD/TAKEN: CPT | Mod: CPTII,,, | Performed by: FAMILY MEDICINE

## 2022-08-31 PROCEDURE — 3044F PR MOST RECENT HEMOGLOBIN A1C LEVEL <7.0%: ICD-10-PCS | Mod: CPTII,,, | Performed by: FAMILY MEDICINE

## 2022-08-31 PROCEDURE — 3074F PR MOST RECENT SYSTOLIC BLOOD PRESSURE < 130 MM HG: ICD-10-PCS | Mod: CPTII,,, | Performed by: FAMILY MEDICINE

## 2022-08-31 PROCEDURE — 3078F PR MOST RECENT DIASTOLIC BLOOD PRESSURE < 80 MM HG: ICD-10-PCS | Mod: CPTII,,, | Performed by: FAMILY MEDICINE

## 2022-08-31 PROCEDURE — 1160F PR REVIEW ALL MEDS BY PRESCRIBER/CLIN PHARMACIST DOCUMENTED: ICD-10-PCS | Mod: CPTII,,, | Performed by: FAMILY MEDICINE

## 2022-08-31 RX ORDER — DILTIAZEM HYDROCHLORIDE 120 MG/1
CAPSULE, EXTENDED RELEASE ORAL
COMMUNITY
End: 2023-07-05 | Stop reason: SDUPTHER

## 2022-08-31 RX ORDER — METHOCARBAMOL 750 MG/1
1 TABLET, FILM COATED ORAL
COMMUNITY
End: 2023-02-08

## 2022-08-31 NOTE — PROGRESS NOTES
Subjective:       Patient ID: Haseeb Reilly is a 51 y.o. male.    Chief Complaint: Follow-up (ER visit)    HPI:  Patient is a 51-year-old male with a history of chronic pain syndrome and polyneuropathy here for follow-up of 2 recent ER visits over the past month for pain.  Patient presented 10 days ago with facial drooping and pain involving the RUE/RLE.  MRI brain did not reveal acute CVA.  Patient has been receiving PT for chronic right shoulder pain.  Now reports weakness involving the right hand x 1 week.  Patient would like to consider surgery for chronic LBP.  Review of Systems   Musculoskeletal:  Positive for back pain.   Neurological:  Positive for weakness and numbness.       Objective:      Physical Exam  Constitutional:       Appearance: He is obese.   Musculoskeletal:      Cervical back: Neck supple. Tenderness (right lateral neck muscles) present.      Right lower leg: No edema.      Left lower leg: No edema.   Neurological:      Mental Status: He is alert.      Motor: Weakness present.      Comments: Motor strength RUE/RLE 4/5, LUE/LLE 5/5       Assessment:       Problem List Items Addressed This Visit          Neuro    Polyneuropathy    Relevant Orders    Ambulatory referral/consult to Pain Clinic     Other Visit Diagnoses       Chronic pain syndrome    -  Primary    Relevant Orders    Ambulatory referral/consult to Pain Clinic    Right hand weakness        Relevant Orders    Ambulatory referral/consult to Occupational Medicine    Chronic right shoulder pain        DDD (degenerative disc disease), lumbosacral                  Plan:     Haseeb was seen today for follow-up.    Diagnoses and all orders for this visit:    Chronic pain syndrome  -     Ambulatory referral/consult to Pain Clinic; Future    Polyneuropathy  -     Ambulatory referral/consult to Pain Clinic; Future    Right hand weakness  -     Ambulatory referral/consult to Occupational Medicine; Future    Chronic right shoulder  pain  Resume PT    DDD (degenerative disc disease), lumbosacral   Advised to follow up with neurosurgery for surgical options

## 2022-08-31 NOTE — PROGRESS NOTES
"Subjective:      Haseeb Reilly is a 51 y.o. male who presents for evaluation of ED.      Erectile Dysfunction  Patient complains of erectile dysfunction. Onset of dysfunction was 4 years ago and was unknown in onset.  Patient states the nature of difficulty is both attaining and maintaining erection. Full erections occur never. Partial erections occur never. Libido is not affected. Risk factors for ED include diabetes mellitus and antihypertensive medications. Patient denies history of neurologic disease, cranial, spinal, or pelvic trauma, and beta blocker use. No previous treatment of ED.    The following portions of the patient's history were reviewed and updated as appropriate: allergies, current medications, past family history, past medical history, past social history, past surgical history and problem list.    Review of Systems  Constitutional: no fever or chills  ENT: no nasal congestion or sore throat  Respiratory: no cough or shortness of breath  Cardiovascular: no chest pain or palpitations  Gastrointestinal: no nausea or vomiting, tolerating diet  Genitourinary: as per HPI  Hematologic/Lymphatic: no easy bruising or lymphadenopathy  Musculoskeletal: no arthralgias or myalgias  Neurological: no seizures or tremors  Behavioral/Psych: no auditory or visual hallucinations     Objective:   Vitals: /88 (BP Location: Right arm, Patient Position: Sitting, BP Method: Small (Automatic))   Pulse 83   Resp 16   Ht 5' 11" (1.803 m)   Wt 127.8 kg (281 lb 12 oz)   BMI 39.30 kg/m²     Physical Exam   General: alert and oriented, no acute distress  Head: normocephalic, atraumatic  Neck: supple, no lymphadenopathy, normal ROM, no masses  Respiratory: Symmetric expansion, non-labored breathing  Cardiovascular: regular rate and rhythm, nomal pulses, no peripheral edema  Abdomen: soft, non tender, non distended  Skin: normal coloration and turgor, no rashes, no suspicious skin lesions noted  Neuro: alert and " oriented x3, no gross deficits  Psych: normal judgment and insight, normal mood/affect, and non-anxious    Physical Exam    Lab Review   Urinalysis demonstrates no specimen   Lab Results   Component Value Date    WBC 5.39 08/24/2022    HGB 14.3 08/24/2022    HCT 42.9 08/24/2022    MCV 89 08/24/2022     08/24/2022     Lab Results   Component Value Date    CREATININE 1.0 08/24/2022    BUN 10 08/24/2022     No results found for: PSA      Assessment and Plan:   1. Erectile dysfunction due to arterial insufficiency  --Reviewed pathophysiology and differential diagnosis of erectile dysfunction with the patient. Treatment options, including relative risks and benefits, were discussed. All questions were answered.  - tadalafiL (CIALIS) 20 MG Tab; Take 1 tablet (20 mg total) by mouth Every 3 (three) days. for 10 days  Dispense: 12 tablet; Refill: 11   --rtc in 1 month for medication assessment     This note is dictated on M*Modal word recognition program.  There are word recognition mistakes that are occasionally missed on review.

## 2022-08-31 NOTE — PROGRESS NOTES
"[facility]   Physical Therapy Treatment Note     Name: Hase[facility] Number: [id number]    Therapy Diagnosis:   Encounter Diagnoses   Name Primary?    Decreased ROM of right shoulder Yes    Decreased strength of upper extremity     Poor posture        Physician: No ref. provider found    Visit Date: 8/31/2022    Physician Orders: PT Eval only and submit Treat   Medical Diagnosis from Referral: M25.511,G89.29 (ICD-10-CM) - Chronic right shoulder pain  Evaluation Date: 8/16/2022  Authorization Period Expiration: 12/31/22  Plan of Care Expiration: 10/11/22  Visit # / Visits authorized: 3/ TBD  FOTO: 3/10    PTA Visit #: 1/5     Time In: 12:00  Time Out: 12:45  Total Billable Time: 45 minutes (3TE,)    Precautions: Standard and Diabetes, hypertension, polyneuropathy,falls     SUBJECTIVE     Pt reports: he went to ED following last appt and he had an injection for pain. he had brain MRI and was cleared for stroke. He has been having bad right hand pain since his colonoscopy and relates to IV in hand. He has been having pain into shoulder blade. It is an aggravating pain.  He didn't take his medicine this morning. He got lost twice at the appoint at a place he has been many times before. He is looking to find a spine specialist. He also reports fall yesterday when he got up from a chair and stumbled and caught screen door which also aggravated it.    He was compliant with home exercise program.  Response to previous treatment: eval only  Functional change: Ongoing    Pain: 5/10  Location: right neck and shoulder     OBJECTIVE     Objective Measures updated at progress report unless specified.     Treatment     Haseeb received the treatments listed below:      therapeutic exercises to develop strength, endurance, ROM, flexibility, posture and core stabilization for 20 minutes including:  Scapular retractions 5" x 20  Supine shoulder flexion 2 x 10  Upper trap stretch 5" x " 10  Bilateral shoulder ER x 15 Red band  Chin tuck 15x     manual therapy techniques: Joint mobilizations, Myofacial release and Soft tissue Mobilization were applied to the: Right shoulder for 25 minutes, including:  Gentle shoulder oscillations   GHJ mobs grade I-II    Shoulder/elbow Passive range of motion in all planes to patients tolerance  Gentle manual Cervical traction   Gentle SOR     Patient Education and Home Exercises     Home Exercises Provided and Patient Education Provided     Education provided:   Anatomy and Pathology.  Symptom management and plan of care progressions.  Home Exercise Program.    Written Home Exercises Provided: Patient instructed to cont prior HEP. Exercises were reviewed and Haseeb was able to demonstrate them prior to the end of the session.  Haseeb demonstrated good  understanding of the education provided. See EMR under Patient Instructions for exercises provided during therapy sessions    ASSESSMENT     Haseeb is a 51 y.o. male referred to outpatient Physical Therapy with a medical diagnosis of chronic right shoulder pain. Patient presents to clinic more alert than previous sessions with report he did not take his pain medications this morning due to another doctors appt. Pt went to ED following last visit and ruled out stroke. He continues to have increased fatigue, report of one fall, and impaired memory leading to getting lost at appointment this morning. Cervical impairments with correlation to right shoulder symptoms. Added cervical manual traction to improve pts tolerance for other activities with reduction in pain. Pt also with good response to GHJ distraction as well. Continued with gentle postural strengthening exercises performed per tolerance. Pt reported some reduction in pain post treatment, not to scale. Continue to monitor and progress as tolerated.     aHseeb Is progressing well towards his goals.   Pt prognosis is Fair.     Pt will continue to benefit from  skilled outpatient physical therapy to address the deficits listed in the problem list box on initial evaluation, provide pt/family education and to maximize pt's level of independence in the home and community environment.     Pt's spiritual, cultural and educational needs considered and pt agreeable to plan of care and goals.     Anticipated barriers to physical therapy: chronicity of neck and back pain, medication side effects    Goals:     Short Term Goals (4 Weeks):   1. Pt will be compliant with HEP to assist PT treatment in restoring pain free motion of the R shoulder. (Ongoing, not met)  2. Pt will improve impaired shoulder MMTs 1/2 grade B to improve strength for functional tasks. (Ongoing, not met)  3. Pt will improve right  shoulder flexion to >/= 20 deg to improve functional mobility of UEs. (Ongoing, not met)  4. Pt will improve right  shoulder abduction to >/= 20 deg to improve functional mobility of UEs (Ongoing, not met)     Long Term Goals (8 Weeks):   1. Pt will improve FOTO score to </= 38% to demonstrate improvements in carrying, moving, and handling objects (Ongoing, not met)  2. Pt will improve impaired shoulder MMTs 1 grade B to improve strength for household duties. (Ongoing, not met)  3. Pt will improve right  shoulder flexion to >/= 150 deg to improve functional mobility of UEs to improve reach into cabinet (Ongoing, not met)  4. Pt will improve right  shoulder abduction to >/= 150 deg to improve functional mobility of UEs to improve dressing. (Ongoing, not met)    PLAN     Plan of care Certification: 8/16/2022 to 10/11/22.  Cont with treatment per POC;   ROSI HAGEN, PT

## 2022-09-01 ENCOUNTER — OFFICE VISIT (OUTPATIENT)
Dept: UROLOGY | Facility: CLINIC | Age: 52
End: 2022-09-01
Payer: MEDICAID

## 2022-09-01 VITALS
WEIGHT: 281.75 LBS | BODY MASS INDEX: 39.44 KG/M2 | RESPIRATION RATE: 16 BRPM | DIASTOLIC BLOOD PRESSURE: 88 MMHG | HEIGHT: 71 IN | SYSTOLIC BLOOD PRESSURE: 130 MMHG | HEART RATE: 83 BPM

## 2022-09-01 DIAGNOSIS — N52.01 ERECTILE DYSFUNCTION DUE TO ARTERIAL INSUFFICIENCY: Primary | ICD-10-CM

## 2022-09-01 PROCEDURE — 3079F PR MOST RECENT DIASTOLIC BLOOD PRESSURE 80-89 MM HG: ICD-10-PCS | Mod: CPTII,,, | Performed by: NURSE PRACTITIONER

## 2022-09-01 PROCEDURE — 99213 PR OFFICE/OUTPT VISIT, EST, LEVL III, 20-29 MIN: ICD-10-PCS | Mod: S$PBB,,, | Performed by: NURSE PRACTITIONER

## 2022-09-01 PROCEDURE — 1159F MED LIST DOCD IN RCRD: CPT | Mod: CPTII,,, | Performed by: NURSE PRACTITIONER

## 2022-09-01 PROCEDURE — 3075F PR MOST RECENT SYSTOLIC BLOOD PRESS GE 130-139MM HG: ICD-10-PCS | Mod: CPTII,,, | Performed by: NURSE PRACTITIONER

## 2022-09-01 PROCEDURE — 4010F PR ACE/ARB THEARPY RXD/TAKEN: ICD-10-PCS | Mod: CPTII,,, | Performed by: NURSE PRACTITIONER

## 2022-09-01 PROCEDURE — 99215 OFFICE O/P EST HI 40 MIN: CPT | Mod: PBBFAC,PN | Performed by: NURSE PRACTITIONER

## 2022-09-01 PROCEDURE — 99999 PR PBB SHADOW E&M-EST. PATIENT-LVL V: CPT | Mod: PBBFAC,,, | Performed by: NURSE PRACTITIONER

## 2022-09-01 PROCEDURE — 3079F DIAST BP 80-89 MM HG: CPT | Mod: CPTII,,, | Performed by: NURSE PRACTITIONER

## 2022-09-01 PROCEDURE — 3075F SYST BP GE 130 - 139MM HG: CPT | Mod: CPTII,,, | Performed by: NURSE PRACTITIONER

## 2022-09-01 PROCEDURE — 1159F PR MEDICATION LIST DOCUMENTED IN MEDICAL RECORD: ICD-10-PCS | Mod: CPTII,,, | Performed by: NURSE PRACTITIONER

## 2022-09-01 PROCEDURE — 1160F PR REVIEW ALL MEDS BY PRESCRIBER/CLIN PHARMACIST DOCUMENTED: ICD-10-PCS | Mod: CPTII,,, | Performed by: NURSE PRACTITIONER

## 2022-09-01 PROCEDURE — 3044F PR MOST RECENT HEMOGLOBIN A1C LEVEL <7.0%: ICD-10-PCS | Mod: CPTII,,, | Performed by: NURSE PRACTITIONER

## 2022-09-01 PROCEDURE — 1160F RVW MEDS BY RX/DR IN RCRD: CPT | Mod: CPTII,,, | Performed by: NURSE PRACTITIONER

## 2022-09-01 PROCEDURE — 4010F ACE/ARB THERAPY RXD/TAKEN: CPT | Mod: CPTII,,, | Performed by: NURSE PRACTITIONER

## 2022-09-01 PROCEDURE — 3008F BODY MASS INDEX DOCD: CPT | Mod: CPTII,,, | Performed by: NURSE PRACTITIONER

## 2022-09-01 PROCEDURE — 3008F PR BODY MASS INDEX (BMI) DOCUMENTED: ICD-10-PCS | Mod: CPTII,,, | Performed by: NURSE PRACTITIONER

## 2022-09-01 PROCEDURE — 99213 OFFICE O/P EST LOW 20 MIN: CPT | Mod: S$PBB,,, | Performed by: NURSE PRACTITIONER

## 2022-09-01 PROCEDURE — 3044F HG A1C LEVEL LT 7.0%: CPT | Mod: CPTII,,, | Performed by: NURSE PRACTITIONER

## 2022-09-01 PROCEDURE — 99999 PR PBB SHADOW E&M-EST. PATIENT-LVL V: ICD-10-PCS | Mod: PBBFAC,,, | Performed by: NURSE PRACTITIONER

## 2022-09-01 RX ORDER — TADALAFIL 20 MG/1
20 TABLET ORAL
Qty: 12 TABLET | Refills: 11 | Status: SHIPPED | OUTPATIENT
Start: 2022-09-01 | End: 2023-04-13

## 2022-09-02 DIAGNOSIS — E11.9 TYPE 2 DIABETES MELLITUS WITHOUT COMPLICATION, WITHOUT LONG-TERM CURRENT USE OF INSULIN: Primary | ICD-10-CM

## 2022-09-07 ENCOUNTER — CLINICAL SUPPORT (OUTPATIENT)
Dept: REHABILITATION | Facility: HOSPITAL | Age: 52
End: 2022-09-07
Payer: MEDICAID

## 2022-09-07 DIAGNOSIS — R29.898 DECREASED STRENGTH OF UPPER EXTREMITY: ICD-10-CM

## 2022-09-07 DIAGNOSIS — R29.3 POOR POSTURE: ICD-10-CM

## 2022-09-07 DIAGNOSIS — M25.611 DECREASED ROM OF RIGHT SHOULDER: Primary | ICD-10-CM

## 2022-09-07 PROCEDURE — 97110 THERAPEUTIC EXERCISES: CPT | Mod: PN,CQ

## 2022-09-07 NOTE — PROGRESS NOTES
"[facility]   Physical Therapy Treatment Note     Name: Haseeb [facility] Number: [id number]    Therapy Diagnosis:   Encounter Diagnoses   Name Primary?    Decreased ROM of right shoulder Yes    Decreased strength of upper extremity     Poor posture          Physician: Aman Patino MD    Visit Date: 9/7/2022    Physician Orders: PT Eval only and submit Treat   Medical Diagnosis from Referral: M25.511,G89.29 (ICD-10-CM) - Chronic right shoulder pain  Evaluation Date: 8/16/2022  Authorization Period Expiration: 12/31/22  Plan of Care Expiration: 10/11/22  Visit # / Visits authorized: 3/ TBD  FOTO: 3/10    PTA Visit #: 1/5     Time In: 12:00  Time Out: 12:45  Total Billable Time: 45 minutes (1TE, 2 MT)    Precautions: Standard and Diabetes, hypertension, polyneuropathy,falls     SUBJECTIVE     Pt reports: He is still having issues with his right hand still being swollen and his left elbow area swollen as well.  States he has an appt with a Neurologist but not until November.  States he took pain meds before coming to PT.    He was compliant with home exercise program.  Response to previous treatment:   Functional change: Ongoing    Pain: 3/10  Location: right neck and shoulder     OBJECTIVE     Objective Measures updated at progress report unless specified.     Treatment     Haseeb received the treatments listed below:      therapeutic exercises to develop strength, endurance, ROM, flexibility, posture and core stabilization for 20 minutes including:    Supine shoulder flexion 2 x 10  Scapular retractions 5" x 20  Upper trap stretch 5" x 10  Bilateral shoulder ER 2x10 Red theraband  Chin tuck 15x     manual therapy techniques: Joint mobilizations, Myofacial release and Soft tissue Mobilization were applied to the: Right shoulder for 25 minutes, including:  Gentle shoulder oscillations   J mobs grade I-II    Shoulder/elbow Passive range of motion in all planes to patients " "tolerance  Gentle sub occipital release  STM/MFR right Upper trap with manual stretch  Gentle SOR Not performed    Patient Education and Home Exercises     Home Exercises Provided and Patient Education Provided     Education provided:   Anatomy and Pathology.  Symptom management and plan of care progressions.  Home Exercise Program.    Written Home Exercises Provided: Patient instructed to cont prior HEP. Exercises were reviewed and Haseeb was able to demonstrate them prior to the end of the session.  Haseeb demonstrated good  understanding of the education provided. See EMR under Patient Instructions for exercises provided during therapy sessions    ASSESSMENT     Haseeb is a 51 y.o. male referred to outpatient Physical Therapy with a medical diagnosis of chronic right shoulder pain.  Cervical impairments with correlation to right shoulder symptoms. Added cervical manual tto improve pts tolerance for other activities with reduction in pain. Pt also with good response to GHJ distraction as well. Continued with gentle postural strengthening exercises performed per tolerance. Pt reported reduction in pain post treatment.  States "better now - I almost don't feel anything"  Not specific to scale.  Continue to monitor and progress as tolerated.     Haseeb Is progressing well towards his goals.   Pt prognosis is Fair.     Pt will continue to benefit from skilled outpatient physical therapy to address the deficits listed in the problem list box on initial evaluation, provide pt/family education and to maximize pt's level of independence in the home and community environment.     Pt's spiritual, cultural and educational needs considered and pt agreeable to plan of care and goals.     Anticipated barriers to physical therapy: chronicity of neck and back pain, medication side effects    Goals:     Short Term Goals (4 Weeks):   1. Pt will be compliant with HEP to assist PT treatment in restoring pain free motion of the R " shoulder. (Ongoing, not met)  2. Pt will improve impaired shoulder MMTs 1/2 grade B to improve strength for functional tasks. (Ongoing, not met)  3. Pt will improve right  shoulder flexion to >/= 20 deg to improve functional mobility of UEs. (Ongoing, not met)  4. Pt will improve right  shoulder abduction to >/= 20 deg to improve functional mobility of UEs (Ongoing, not met)     Long Term Goals (8 Weeks):   1. Pt will improve FOTO score to </= 38% to demonstrate improvements in carrying, moving, and handling objects (Ongoing, not met)  2. Pt will improve impaired shoulder MMTs 1 grade B to improve strength for household duties. (Ongoing, not met)  3. Pt will improve right  shoulder flexion to >/= 150 deg to improve functional mobility of UEs to improve reach into cabinet (Ongoing, not met)  4. Pt will improve right  shoulder abduction to >/= 150 deg to improve functional mobility of UEs to improve dressing. (Ongoing, not met)    PLAN     Plan of care Certification: 8/16/2022 to 10/11/22.  Cont with treatment per POC;   Venita Mario, PTA

## 2022-09-09 RX ORDER — EMPAGLIFLOZIN 10 MG/1
TABLET, FILM COATED ORAL
Qty: 90 TABLET | Refills: 0 | Status: SHIPPED | OUTPATIENT
Start: 2022-09-09 | End: 2022-12-28

## 2022-09-14 ENCOUNTER — CLINICAL SUPPORT (OUTPATIENT)
Dept: REHABILITATION | Facility: HOSPITAL | Age: 52
End: 2022-09-14
Payer: MEDICAID

## 2022-09-14 DIAGNOSIS — R29.898 DECREASED STRENGTH OF UPPER EXTREMITY: ICD-10-CM

## 2022-09-14 DIAGNOSIS — M25.611 DECREASED ROM OF RIGHT SHOULDER: Primary | ICD-10-CM

## 2022-09-14 DIAGNOSIS — R29.3 POOR POSTURE: ICD-10-CM

## 2022-09-14 PROCEDURE — 97110 THERAPEUTIC EXERCISES: CPT | Mod: PN

## 2022-09-14 NOTE — PROGRESS NOTES
[facility]   Physical Therapy Treatment Note     [facility] [id number]    Therapy Diagnosis:   Encounter Diagnoses   Name Primary?    Decreased ROM of right shoulder Yes    Decreased strength of upper extremity     Poor posture      Physician: Aman Patino MD    Visit Date: 9/14/2022    Physician Orders: PT Eval only and submit Treat   Medical Diagnosis from Referral: M25.511,G89.29 (ICD-10-CM) - Chronic right shoulder pain  Evaluation Date: 8/16/2022  Authorization Period Expiration: 12/31/22  Plan of Care Expiration: 10/11/22  Visit # / Visits authorized: 4/ 12  FOTO: 5/10    PTA Visit #: 0/5     Time In: 12:00  Time Out: 12:55  Total Billable Time: 53 minutes    Precautions: Standard and Diabetes, hypertension, polyneuropathy,falls     SUBJECTIVE     Pt reports: shoulder is moving better, but he is still having right sided neck pain (even at end range shoulder motion).  He was compliant with home exercise program.  Response to previous treatment:   Functional change: Ongoing    Pain: 2/10  Location: right neck and shoulder     OBJECTIVE     Objective Measures updated at progress report unless specified.    9/14/2022  Hypermobile C2-4 segments with flexion and extension >>> reproduction of neural tension symptoms down spine    Treatment     Haseeb received the treatments listed below:      therapeutic exercises to develop strength, endurance, ROM, flexibility, posture and core stabilization for 30 minutes including:  Supine shoulder flexion - 2#; 3x10  Sidelying external rotation (with towel) - 1#; 3x10  Seated 90/90 External Rotation - 2#; 5x8  Seated Slump position nerve floss - knee extension with plantarflexion/dorsiflexion (x30); knee extension with plantarflexion and cervical flexion/extension (x30)  Bilateral shoulder ER 2x10 Red theraband - NT    manual therapy techniques: Joint mobilizations, Myofacial release and Soft tissue Mobilization were  applied to the: Right shoulder for 23 minutes, including:  Right GH posterior-inferior mobilizations in internal rotation (grade 3-4)  Cervical segmental mobility assessment    Patient Education and Home Exercises     Home Exercises Provided and Patient Education Provided     Education provided:   Symptom management and plan of care progressions.  Home Exercise Program.    Written Home Exercises Provided: Patient instructed to cont prior HEP. Exercises were reviewed and Haseeb was able to demonstrate them prior to the end of the session.  Haseeb demonstrated good  understanding of the education provided. See EMR under Patient Instructions for exercises provided during therapy sessions    ASSESSMENT     Haseeb is a 51 y.o. male referred to outpatient Physical Therapy with a medical diagnosis of chronic right shoulder pain. Cervical symptoms remained following manual therapy techniques and shoulder exercises. After cervical assessment, pt demonstrated spinal neural tension. No shoulder pain at end range following new nerve floss exercise. Reassess symptoms at next visit and proceed as indicated.     Haseeb Is progressing well towards his goals.   Pt prognosis is Fair.     Pt will continue to benefit from skilled outpatient physical therapy to address the deficits listed in the problem list box on initial evaluation, provide pt/family education and to maximize pt's level of independence in the home and community environment. Pt's spiritual, cultural and educational needs considered and pt agreeable to plan of care and goals.     Anticipated barriers to physical therapy: chronicity of neck and back pain, medication side effects    Goals:  Short Term Goals (4 Weeks):   1. Pt will be compliant with HEP to assist PT treatment in restoring pain free motion of the R shoulder. (Ongoing, not met)  2. Pt will improve impaired shoulder MMTs 1/2 grade B to improve strength for functional tasks. (Ongoing, not met)  3. Pt will  improve right  shoulder flexion to >/= 20 deg to improve functional mobility of UEs. (Ongoing, not met)  4. Pt will improve right  shoulder abduction to >/= 20 deg to improve functional mobility of UEs (Ongoing, not met)     Long Term Goals (8 Weeks):   1. Pt will improve FOTO score to </= 38% to demonstrate improvements in carrying, moving, and handling objects (Ongoing, not met)  2. Pt will improve impaired shoulder MMTs 1 grade B to improve strength for household duties. (Ongoing, not met)  3. Pt will improve right  shoulder flexion to >/= 150 deg to improve functional mobility of UEs to improve reach into cabinet (Ongoing, not met)  4. Pt will improve right  shoulder abduction to >/= 150 deg to improve functional mobility of UEs to improve dressing. (Ongoing, not met)    PLAN     Cont with treatment per POC; Monitor neural tension signs.    Plan of care Certification: 8/16/2022 to 10/11/22.    Terell Blanc, PT , DPT, OCS

## 2022-09-19 ENCOUNTER — CLINICAL SUPPORT (OUTPATIENT)
Dept: REHABILITATION | Facility: HOSPITAL | Age: 52
End: 2022-09-19
Payer: MEDICAID

## 2022-09-19 DIAGNOSIS — R29.898 DECREASED STRENGTH OF UPPER EXTREMITY: ICD-10-CM

## 2022-09-19 DIAGNOSIS — M25.611 DECREASED ROM OF RIGHT SHOULDER: Primary | ICD-10-CM

## 2022-09-19 DIAGNOSIS — R29.3 POOR POSTURE: ICD-10-CM

## 2022-09-19 PROCEDURE — 97110 THERAPEUTIC EXERCISES: CPT | Mod: PO

## 2022-09-19 NOTE — PROGRESS NOTES
"[facility]   Physical Therapy Treatment Note     Name: Haseeb [facility] Number: [id number]    Therapy Diagnosis:   Encounter Diagnoses   Name Primary?    Decreased ROM of right shoulder Yes    Decreased strength of upper extremity     Poor posture        Physician: Aman Patino MD    Visit Date: 9/19/2022    Physician Orders: PT Eval only and submit Treat   Medical Diagnosis from Referral: M25.511,G89.29 (ICD-10-CM) - Chronic right shoulder pain  Evaluation Date: 8/16/2022  Authorization Period Expiration: 12/31/22  Plan of Care Expiration: 10/11/22  Visit # / Visits authorized: 1/20  FOTO: 5/10    PTA Visit #: 0/5     Time In: 1:50 pm  Time Out: 2:30 pm  Total Billable Time: 40 minutes    Precautions: Standard and Diabetes, hypertension, polyneuropathy,falls     SUBJECTIVE     Pt reports: "Today its the back and shoulders". He states that he has done the home exercises consistently and they usually help     He was compliant with home exercise program.  Response to previous treatment:   Functional change: Ongoing    Pain: 8/10 shoulder/neck     Location: right neck and shoulder     OBJECTIVE     Range of Motion/Strength:   CERVICAL AROM Pain/Dysfunction with Movement   Flexion 20* Right neck pain all movements    Extension 30*     Right side bending 30*     Left side bending 35*     Right rotation 50*     Left rotation 45*        Shoulder Right Left Pain/Dysfunction with Movement   AROM/PROM         flexion  90*/150* 140/170 Neck pain > shoulder; end range shoulder pain    extension  45  55     abduction  90*/100* 145/170 Neck pain > shoulder; end range shoulder pain    Internal rotation  L1  T10/ WNL      External rotation Occiput*  T2/ WNL         U/E MMT Right Left Pain/Dysfunction with Movement   Shoulder Flexion 4-/5* 4+/5     Shoulder Extension 4-/5 4/5     Shoulder Abduction 4-/5* 4+/5     Shoulder IR 5/5 5/5     Shoulder ER    4/5* 4+/5         Treatment     Haseeb" "Subjective:      Patient ID: Merle Holm is a 32 y.o. female.    Chief Complaint: Sore Throat    Patient was seen in Urgent Care yesterday and tested positive for strep, was given Pen VK and has taken 2 doses thus far but symptoms seem to be worsening       Sore Throat    This is a new problem. The current episode started in the past 7 days (3days). The problem has been gradually worsening. The pain is worse on the left side. Associated symptoms include abdominal pain and headaches. Pertinent negatives include no congestion, coughing, diarrhea, hoarse voice, shortness of breath or vomiting. She has had exposure to strep. Treatments tried: Pen VK, Ibuprofen/Tylenol.     Review of Systems   Constitutional: Positive for appetite change (decreased d/t pain), chills, diaphoresis and fever (102 was the highest, 100.6, has been taking Motrin/Tylenol round the clock).   HENT: Positive for rhinorrhea (mild) and sore throat. Negative for congestion and hoarse voice.    Respiratory: Negative for cough, shortness of breath and wheezing.    Gastrointestinal: Positive for abdominal pain and nausea. Negative for diarrhea and vomiting.   Skin: Negative for rash.   Neurological: Positive for light-headedness and headaches. Negative for dizziness.       Objective:   /64 (BP Location: Left arm, Patient Position: Sitting, BP Method: Small (Manual))   Pulse (!) 130   Temp 98.6 °F (37 °C) (Tympanic)   Ht 5' 4" (1.626 m)   Wt 54.9 kg (121 lb 0.5 oz)   LMP 01/18/2018   SpO2 98%   BMI 20.78 kg/m²   Physical Exam   Constitutional: She appears well-developed and well-nourished. She does not appear ill. No distress.   HENT:   Head: Normocephalic and atraumatic.   Right Ear: Tympanic membrane and ear canal normal. No tenderness. Tympanic membrane is not erythematous.   Left Ear: Tympanic membrane and ear canal normal. No tenderness. Tympanic membrane is not erythematous.   Nose: Nose normal. No mucosal edema or rhinorrhea. " "received the treatments listed below:      therapeutic exercises to develop strength, endurance, ROM, flexibility, posture and core stabilization for 40 minutes including:  Cervical AROM with towel - x20 all directions  Pain neuroscience education (see educaton for details)- 20 mins   Reassessment as above 15 mins     NP:  Supine shoulder flexion - 2#; 3x10  Sidelying external rotation (with towel) - 1#; 3x10  Seated 90/90 External Rotation - 2#; 5x8  Seated Slump position nerve floss - knee extension with plantarflexion/dorsiflexion (x30); knee extension with plantarflexion and cervical flexion/extension (x30)  Bilateral shoulder ER 2x10 Red theraband - NT    manual therapy techniques: Joint mobilizations, Myofacial release and Soft tissue Mobilization were applied to the: Right shoulder for 00 minutes, including:    Patient Education and Home Exercises     Home Exercises Provided and Patient Education Provided     Education provided:   Pain neuroscience education with educational youtube videos entitled "Tame the beast" and "understanding pain in 5 minutes and what to do about it"   Home Exercise Program.    Written Home Exercises Provided: Patient instructed to cont prior HEP. Exercises were reviewed and Haseeb was able to demonstrate them prior to the end of the session.  Haseeb demonstrated good  understanding of the education provided. See EMR under Patient Instructions for exercises provided during therapy sessions    ASSESSMENT     Haseeb's reassessment shows no improvement since the initial evaluation. It is evident that he presents with central sensitization which has not been addressed in his care thus far. Will likely benefit from constant pain neuroscience education with strengthening and graded exposure going forward. Unlikely to benefit from passive treatments such as manual therapy at this time.     Haseeb Is progressing well towards his goals.   Pt prognosis is Fair.     Pt will continue to " Right sinus exhibits no maxillary sinus tenderness and no frontal sinus tenderness. Left sinus exhibits no maxillary sinus tenderness and no frontal sinus tenderness.   Mouth/Throat: Uvula is midline. Posterior oropharyngeal erythema present. Tonsils are 2+ on the right. Tonsils are 2+ on the left. Tonsillar exudate (gross amount bilaterally).   (-) trismus   Cardiovascular: Normal rate, regular rhythm and normal heart sounds.    No murmur heard.  Pulmonary/Chest: Effort normal and breath sounds normal. No respiratory distress. She has no decreased breath sounds. She has no wheezes. She has no rhonchi. She has no rales.   Lymphadenopathy:     She has cervical adenopathy (anterior, bilaterally).   Skin: Skin is warm and dry. No rash noted. She is not diaphoretic.   Psychiatric: She has a normal mood and affect. Her speech is normal and behavior is normal. Thought content normal.     Assessment:      1. Strep pharyngitis       Plan:   Strep pharyngitis  -     amoxicillin-clavulanate 875-125mg (AUGMENTIN) 875-125 mg per tablet; Take 1 tablet by mouth 2 (two) times daily.  Dispense: 20 tablet; Refill: 0  -     betamethasone acetate-betamethasone sodium phosphate injection 12 mg; Inject 2 mLs (12 mg total) into the muscle one time.    Adult dose of bicillin not available in office    Spoke with provider that previously saw patient that stated she does not recall any significant amounts of puss on tonsils yesterdays  Discuss with patient that we will administer steroid to help reduce pain/discomfort/swelling and switch antibiotics (patient recalls that her children previously also had problems with strep being resistant to antibiotics)  Strong ER warning if symptoms worsens or she develops trismus, hoarseness, drooling, spike in fever    Gave handout on strep pharyngitis.  Printed AVS and reviewed treatment plan in detail.    Discussed worsening signs/symptoms and when to return to clinic or go to ED.   Patient expresses  benefit from skilled outpatient physical therapy to address the deficits listed in the problem list box on initial evaluation, provide pt/family education and to maximize pt's level of independence in the home and community environment. Pt's spiritual, cultural and educational needs considered and pt agreeable to plan of care and goals.     Anticipated barriers to physical therapy: chronicity of neck and back pain, medication side effects    Goals:  Short Term Goals (4 Weeks):   1. Pt will be compliant with HEP to assist PT treatment in restoring pain free motion of the R shoulder. (Ongoing, not met)  2. Pt will improve impaired shoulder MMTs 1/2 grade B to improve strength for functional tasks. (Ongoing, not met)  3. Pt will improve right  shoulder flexion to >/= 20 deg to improve functional mobility of UEs. (Ongoing, not met)  4. Pt will improve right  shoulder abduction to >/= 20 deg to improve functional mobility of UEs (Ongoing, not met)     Long Term Goals (8 Weeks):   1. Pt will improve FOTO score to </= 38% to demonstrate improvements in carrying, moving, and handling objects (Ongoing, not met)  2. Pt will improve impaired shoulder MMTs 1 grade B to improve strength for household duties. (Ongoing, not met)  3. Pt will improve right  shoulder flexion to >/= 150 deg to improve functional mobility of UEs to improve reach into cabinet (Ongoing, not met)  4. Pt will improve right  shoulder abduction to >/= 150 deg to improve functional mobility of UEs to improve dressing. (Ongoing, not met)    PLAN     Cont with pain neuroscience education, graded exposure, and progressive strengthening.     Plan of care Certification: 8/16/2022 to 10/11/22.    Samson Arce, PT , DPT       understanding and agrees with treatment plan.

## 2022-09-22 ENCOUNTER — CLINICAL SUPPORT (OUTPATIENT)
Dept: REHABILITATION | Facility: HOSPITAL | Age: 52
End: 2022-09-22
Payer: MEDICAID

## 2022-09-22 ENCOUNTER — OFFICE VISIT (OUTPATIENT)
Dept: PRIMARY CARE CLINIC | Facility: CLINIC | Age: 52
End: 2022-09-22
Payer: MEDICAID

## 2022-09-22 VITALS
TEMPERATURE: 99 F | HEART RATE: 89 BPM | HEIGHT: 71 IN | BODY MASS INDEX: 39.28 KG/M2 | SYSTOLIC BLOOD PRESSURE: 134 MMHG | WEIGHT: 280.56 LBS | DIASTOLIC BLOOD PRESSURE: 91 MMHG | OXYGEN SATURATION: 98 %

## 2022-09-22 DIAGNOSIS — E53.8 LOW SERUM VITAMIN B12: ICD-10-CM

## 2022-09-22 DIAGNOSIS — E11.9 TYPE 2 DIABETES MELLITUS WITHOUT COMPLICATION, WITHOUT LONG-TERM CURRENT USE OF INSULIN: ICD-10-CM

## 2022-09-22 DIAGNOSIS — F41.9 ANXIETY AND DEPRESSION: ICD-10-CM

## 2022-09-22 DIAGNOSIS — R29.3 POOR POSTURE: ICD-10-CM

## 2022-09-22 DIAGNOSIS — F32.A ANXIETY AND DEPRESSION: ICD-10-CM

## 2022-09-22 DIAGNOSIS — G89.4 CHRONIC PAIN SYNDROME: Primary | ICD-10-CM

## 2022-09-22 DIAGNOSIS — R29.898 DECREASED STRENGTH OF UPPER EXTREMITY: ICD-10-CM

## 2022-09-22 DIAGNOSIS — G62.9 POLYNEUROPATHY: Primary | ICD-10-CM

## 2022-09-22 DIAGNOSIS — M25.611 DECREASED ROM OF RIGHT SHOULDER: Primary | ICD-10-CM

## 2022-09-22 PROCEDURE — 3080F DIAST BP >= 90 MM HG: CPT | Mod: CPTII,,, | Performed by: NURSE PRACTITIONER

## 2022-09-22 PROCEDURE — 99999 PR PBB SHADOW E&M-EST. PATIENT-LVL V: ICD-10-PCS | Mod: PBBFAC,,, | Performed by: NURSE PRACTITIONER

## 2022-09-22 PROCEDURE — 3080F PR MOST RECENT DIASTOLIC BLOOD PRESSURE >= 90 MM HG: ICD-10-PCS | Mod: CPTII,,, | Performed by: NURSE PRACTITIONER

## 2022-09-22 PROCEDURE — 99999 PR PBB SHADOW E&M-EST. PATIENT-LVL V: CPT | Mod: PBBFAC,,, | Performed by: NURSE PRACTITIONER

## 2022-09-22 PROCEDURE — 3008F PR BODY MASS INDEX (BMI) DOCUMENTED: ICD-10-PCS | Mod: CPTII,,, | Performed by: NURSE PRACTITIONER

## 2022-09-22 PROCEDURE — 97110 THERAPEUTIC EXERCISES: CPT | Mod: PO,CQ

## 2022-09-22 PROCEDURE — 3075F SYST BP GE 130 - 139MM HG: CPT | Mod: CPTII,,, | Performed by: NURSE PRACTITIONER

## 2022-09-22 PROCEDURE — 3075F PR MOST RECENT SYSTOLIC BLOOD PRESS GE 130-139MM HG: ICD-10-PCS | Mod: CPTII,,, | Performed by: NURSE PRACTITIONER

## 2022-09-22 PROCEDURE — 99213 PR OFFICE/OUTPT VISIT, EST, LEVL III, 20-29 MIN: ICD-10-PCS | Mod: S$PBB,,, | Performed by: NURSE PRACTITIONER

## 2022-09-22 PROCEDURE — 4010F PR ACE/ARB THEARPY RXD/TAKEN: ICD-10-PCS | Mod: CPTII,,, | Performed by: NURSE PRACTITIONER

## 2022-09-22 PROCEDURE — 3044F PR MOST RECENT HEMOGLOBIN A1C LEVEL <7.0%: ICD-10-PCS | Mod: CPTII,,, | Performed by: NURSE PRACTITIONER

## 2022-09-22 PROCEDURE — 3008F BODY MASS INDEX DOCD: CPT | Mod: CPTII,,, | Performed by: NURSE PRACTITIONER

## 2022-09-22 PROCEDURE — 3044F HG A1C LEVEL LT 7.0%: CPT | Mod: CPTII,,, | Performed by: NURSE PRACTITIONER

## 2022-09-22 PROCEDURE — 1159F PR MEDICATION LIST DOCUMENTED IN MEDICAL RECORD: ICD-10-PCS | Mod: CPTII,,, | Performed by: NURSE PRACTITIONER

## 2022-09-22 PROCEDURE — 99215 OFFICE O/P EST HI 40 MIN: CPT | Mod: PBBFAC,PN | Performed by: NURSE PRACTITIONER

## 2022-09-22 PROCEDURE — 1159F MED LIST DOCD IN RCRD: CPT | Mod: CPTII,,, | Performed by: NURSE PRACTITIONER

## 2022-09-22 PROCEDURE — 4010F ACE/ARB THERAPY RXD/TAKEN: CPT | Mod: CPTII,,, | Performed by: NURSE PRACTITIONER

## 2022-09-22 PROCEDURE — 99213 OFFICE O/P EST LOW 20 MIN: CPT | Mod: S$PBB,,, | Performed by: NURSE PRACTITIONER

## 2022-09-22 NOTE — PROGRESS NOTES
"[facility]   Physical Therapy Treatment Note     Name: Haseeb [facility] Number: [id number]    Therapy Diagnosis:   No diagnosis found.      Physician: Aman Patino MD    Visit Date: 9/22/2022    Physician Orders: PT Eval only and submit Treat   Medical Diagnosis from Referral: M25.511,G89.29 (ICD-10-CM) - Chronic right shoulder pain  Evaluation Date: 8/16/2022  Authorization Period Expiration: 12/31/22  Plan of Care Expiration: 10/11/22  Visit # / Visits authorized: 5/16  FOTO: 5/10    PTA Visit #: 1/5     Time In: 3:30 pm  Time Out: 4:08 pm  Total Billable Time: 38 minutes    Precautions: Standard and Diabetes, hypertension, polyneuropathy,falls     SUBJECTIVE     Pt reports: "I almost cancel today because of my back pain"    He was compliant with home exercise program.  Response to previous treatment:   Functional change: Ongoing    Pain:  8/10 shoulder/neck     Location: right neck and shoulder     OBJECTIVE     Range of Motion/Strength:   CERVICAL AROM Pain/Dysfunction with Movement   Flexion 20* Right neck pain all movements    Extension 30*     Right side bending 30*     Left side bending 35*     Right rotation 50*     Left rotation 45*        Shoulder Right Left Pain/Dysfunction with Movement   AROM/PROM         flexion  90*/150* 140/170 Neck pain > shoulder; end range shoulder pain    extension  45  55     abduction  90*/100* 145/170 Neck pain > shoulder; end range shoulder pain    Internal rotation  L1  T10/ WNL      External rotation Occiput*  T2/ WNL         U/E MMT Right Left Pain/Dysfunction with Movement   Shoulder Flexion 4-/5* 4+/5     Shoulder Extension 4-/5 4/5     Shoulder Abduction 4-/5* 4+/5     Shoulder IR 5/5 5/5     Shoulder ER    4/5* 4+/5         Treatment     Haseeb received the treatments listed below:      therapeutic exercises to develop strength, endurance, ROM, flexibility, posture and core stabilization for 38 minutes including:  Pulley " "flex/scap 3 min ea   UT stretch 3x30"  LS stretch 3x30" (not tolerated)  Supine chin tucks 2x10 5" hold  SNAG flexion in chair 10x10"  SNAG rotation in chair 10x10"  Supine shoulder flex to 90 2x10    NP:  Pain neuroscience education (see educaton for details)- 20 mins   Supine shoulder flexion - 2#; 3x10  Sidelying external rotation (with towel) - 1#; 3x10  Seated 90/90 External Rotation - 2#; 5x8  Seated Slump position nerve floss - knee extension with plantarflexion/dorsiflexion (x30); knee extension with plantarflexion and cervical flexion/extension (x30)  Bilateral shoulder ER 2x10 Red theraband - NT    manual therapy techniques: Joint mobilizations, Myofacial release and Soft tissue Mobilization were applied to the: Right shoulder for 8 minutes, including:  Shoulder rhythmic stabilization  Isometric ER/IR       Patient Education and Home Exercises     Home Exercises Provided and Patient Education Provided     Education provided:   Pain neuroscience education with educational youtube videos entitled "Tame the beast" and "understanding pain in 5 minutes and what to do about it"   Home Exercise Program.    Written Home Exercises Provided: Patient instructed to cont prior HEP. Exercises were reviewed and Haseeb was able to demonstrate them prior to the end of the session.  Haseeb demonstrated good  understanding of the education provided. See EMR under Patient Instructions for exercises provided during therapy sessions    ASSESSMENT     Today session attempted to focus on increasing Haseeb cervical and shoulder ROM, however pt was festinated lumbar pain. Pt required redirecting while performing therex to prevent fixations on lumbar/shoulder pain. Overall Haseeb tolerated today session poorly secondary to lumbar pain, and will likely benefit from constant pain neuroscience education during the strengthening phase of physical therapy.      Haseeb Is progressing well towards his goals.   Pt prognosis is Fair. "     Pt will continue to benefit from skilled outpatient physical therapy to address the deficits listed in the problem list box on initial evaluation, provide pt/family education and to maximize pt's level of independence in the home and community environment. Pt's spiritual, cultural and educational needs considered and pt agreeable to plan of care and goals.     Anticipated barriers to physical therapy: chronicity of neck and back pain, medication side effects    Goals:  Short Term Goals (4 Weeks):   1. Pt will be compliant with HEP to assist PT treatment in restoring pain free motion of the R shoulder. (Ongoing, not met)  2. Pt will improve impaired shoulder MMTs 1/2 grade B to improve strength for functional tasks. (Ongoing, not met)  3. Pt will improve right  shoulder flexion to >/= 20 deg to improve functional mobility of UEs. (Ongoing, not met)  4. Pt will improve right  shoulder abduction to >/= 20 deg to improve functional mobility of UEs (Ongoing, not met)     Long Term Goals (8 Weeks):   1. Pt will improve FOTO score to </= 38% to demonstrate improvements in carrying, moving, and handling objects (Ongoing, not met)  2. Pt will improve impaired shoulder MMTs 1 grade B to improve strength for household duties. (Ongoing, not met)  3. Pt will improve right  shoulder flexion to >/= 150 deg to improve functional mobility of UEs to improve reach into cabinet (Ongoing, not met)  4. Pt will improve right  shoulder abduction to >/= 150 deg to improve functional mobility of UEs to improve dressing. (Ongoing, not met)    PLAN     Cont with pain neuroscience education, graded exposure, and progressive strengthening.     Plan of care Certification: 8/16/2022 to 10/11/22.    Tamica Malone, PTA , DPT

## 2022-09-22 NOTE — PROGRESS NOTES
Subjective:       Patient ID: Haseeb Reilly is a 51 y.o. male.    Chief Complaint: Back Pain (/) and Neck Pain    Mr. Haseeb Reilly is a 51 year old male, established with me, presents to the clinic for pain and anxiety . PCP is Aman Patino.  Medical and surgical history in addition to problem list reviewed as listed below.      Back Pain  Associated symptoms include headaches, leg pain, numbness, paresthesias and tingling.   Neck Pain   Associated symptoms include headaches, leg pain, numbness and tingling.   Chronic Pain  Past Medical History Includes::  Chronic pain syndrome  Chronicity:  Recurrent  Onset:  More than 1 year ago  Frequency:  Constantly  Progression since onset:  Gradually worsening  Pain location:  Lumbar spine, hip, legs, shoulder and cervical spine  Pain - numeric:  8/10  Medications Tried:  NSAIDs and Cymbalta (Duloxeline)  Procedures Tried:  Physical Therapy  Previous Imaging:  MRI Scan  Current treatment:  Cymbalta (Duloxeline) and hydrocodone/acetaminophen (Hycet, LorcetLortab, Norco, Vicodin)  Improvement on Current Treatment:  None  Goals of therapy:  Improve ADL's and Return to work  Associated symptoms: difficulty with ADL's, headaches, leg pain, mental impairment, numbness, paresthesias, tingling, nausea and itching    Associated symptoms: no diaphoresis    Drug Screen: No    Pain Contract: No    Prescription Monitoring Program  reviewed: Yes    Psychiatric Disorders:  Anxiety and depression  Opioid Risk Tool Completed: No    PEG-3 Completed: No    PHQ-4 Completed: No    Anxiety  Presents for initial visit. Onset was 1 to 6 months ago. The problem has been unchanged. Symptoms include decreased concentration, excessive worry, irritability, muscle tension, nausea, nervous/anxious behavior, palpitations, panic and restlessness. Patient reports no suicidal ideas. Symptoms occur constantly. The severity of symptoms is causing significant distress. Exacerbated by: finances and  inability to work secondary to chronic pain. The patient sleeps 4 hours per night. The quality of sleep is poor. Nighttime awakenings: several.     Risk factors: loss of income. Past treatments include nothing.     Past Medical History:   Diagnosis Date    Diabetes mellitus     Hypertension     Mixed hyperlipidemia         History reviewed. No pertinent surgical history.     History reviewed. No pertinent family history.    Social History     Tobacco Use   Smoking Status Never   Smokeless Tobacco Never       Social History     Social History Narrative    Not on file       Review of patient's allergies indicates:   Allergen Reactions    Morphine         Review of Systems   Constitutional:  Positive for irritability. Negative for diaphoresis.   Cardiovascular:  Positive for palpitations.   Gastrointestinal:  Positive for nausea.   Musculoskeletal:  Positive for back pain and neck pain.   Skin:  Positive for itching.   Neurological:  Positive for tingling, numbness, headaches and paresthesias.   Psychiatric/Behavioral:  Positive for decreased concentration. Negative for suicidal ideas. The patient is nervous/anxious.        Objective:        Vitals:    09/22/22 0936   BP: (!) 134/91   Pulse: 89   Temp: 98.6 °F (37 °C)        Physical Exam  Constitutional:       Appearance: Normal appearance.   HENT:      Right Ear: External ear normal.      Left Ear: External ear normal.   Eyes:      Conjunctiva/sclera: Conjunctivae normal.      Pupils: Pupils are equal, round, and reactive to light.   Musculoskeletal:         General: Normal range of motion.   Skin:     Capillary Refill: Capillary refill takes 2 to 3 seconds.   Neurological:      Mental Status: He is alert and oriented to person, place, and time.   Psychiatric:         Mood and Affect: Mood is depressed.         Behavior: Behavior is agitated. Behavior is cooperative.         Thought Content: Thought content does not include suicidal ideation. Thought content does not  include suicidal plan.       Assessment:       1. Chronic pain syndrome    2. Anxiety and depression    3. Type 2 diabetes mellitus without complication, without long-term current use of insulin        Plan:       Chronic pain syndrome  -     Ambulatory referral/consult to Pain Clinic; Future; Expected date: 09/22/2022  -     Ambulatory referral/consult to Occupational Medicine; Future; Expected date: 09/22/2022    Anxiety and depression  -     Ambulatory referral/consult to Psychiatry; Future; Expected date: 09/22/2022  -     Ambulatory referral/consult to Occupational Medicine; Future; Expected date: 09/22/2022    Type 2 diabetes mellitus without complication, without long-term current use of insulin  -     Microalbumin/Creatinine Ratio, Urine     Medication List with Changes/Refills   Current Medications    AMITRIPTYLINE (ELAVIL) 25 MG TABLET    TAKE 1 TABLET    ATORVASTATIN (LIPITOR) 40 MG TABLET    Take 1 tablet (40 mg total) by mouth every evening. High cholesterol    BLOOD-GLUCOSE METER [facility]    Accu-Chek Guide Me Glucose Meter   CHECK BLOOD GLUCOSE TWICE DAILY FOR DIABETES    CARTIA  MG CP24    TAKE 1 CAPSULE BY MOUTH ONCE DAILY FOR HIGH BLOOD PRESSURE    CHOLECALCIFEROL, VITAMIN D3, 1,250 MCG (50,000 UNIT) CAPSULE    Take 50,000 Units by mouth once a week.    CLONIDINE 0.2 MG/24 HR TD PTWK (CATAPRES) 0.2 MG/24 HR    1 patch to skin    CYANOCOBALAMIN 1,000 MCG/ML INJECTION    Inject 1 mL (1,000 mcg total) into the muscle once a week for 30 days, THEN 1 mL (1,000 mcg total) every 30 days.    CYCLOBENZAPRINE (FLEXERIL) 10 MG TABLET    Take 1 tablet (10 mg total) by mouth 3 (three) times daily.    DILTIAZEM (DILACOR XR) 120 MG CDCR    TAKE 1 CAPSULE BY MOUTH ONCE DAILY FOR HIGH BLOOD PRESSURE    DULAGLUTIDE (TRULICITY) 3 MG/0.5 ML PEN INJECTOR    Inject 3 mg into the skin every 7 days.    FAMOTIDINE (PEPCID) 40 MG TABLET    Take 1 tablet (40 mg total) by mouth nightly as needed for Heartburn.    FUROSEMIDE  "(LASIX) 40 MG TABLET    TAKE 1 TABLET BY MOUTH ONCE DAILY FOR  WATER  PILL.    HYDROCODONE-ACETAMINOPHEN (NORCO) 5-325 MG PER TABLET    Take 1 tablet by mouth every 4 (four) hours as needed for Pain.    JARDIANCE 10 MG TABLET    TAKE 1 TABLET BY MOUTH ONCE DAILY FOR DIABETES    LANCETS (ACCU-CHEK FASTCLIX LANCET DRUM MISC)    Accu-Chek Fastclix Lancet Drum   USE TO CHECK GLUCOSE TWICE DAILY FOR DIABETES    LINACLOTIDE (LINZESS) 145 MCG CAP CAPSULE    TAKE 1 CAPSULE BY MOUTH ONCE DAILY FOR CONSTIPATION    LISINOPRIL-HYDROCHLOROTHIAZIDE (PRINZIDE,ZESTORETIC) 20-25 MG TAB    Take 1 tablet by mouth once daily. High blood pressure    METFORMIN (GLUCOPHAGE) 1000 MG TABLET    TAKE 1 TABLET BY MOUTH TWICE DAILY WITH MEALS    METHOCARBAMOL (ROBAXIN) 750 MG TAB    1 tablet    METHOCARBAMOL (ROBAXIN) 750 MG TAB    1 tablet.    NAPROXEN (NAPROSYN) 500 MG TABLET    Take 1 tablet (500 mg total) by mouth 2 (two) times daily with meals.    PANTOPRAZOLE (PROTONIX) 40 MG TABLET    Take 1 tablet (40 mg total) by mouth once daily. heartburn    PEN NEEDLE, DIABETIC (BD ULTRA-FINE SHORT PEN NEEDLE) 31 GAUGE X 5/16" NDLE    USE 1 PEN NEEDLE TO INJECT INSULIN ONCE DAILY.    PEN NEEDLE, DIABETIC 31 GAUGE X 1/4" NDLE    pen needle, diabetic 31 gauge x 1/4"   USE 1 PEN NEEDLE WITH VICTOZA ONCE DAILY    PEN NEEDLE, DIABETIC 31 GAUGE X 5/16" NDLE    BD Ultra-Fine Short Pen Needle 31 gauge x 5/16"   USE 1 PEN NEEDLE TO INJECT INSULIN ONCE DAILY.    PEN NEEDLE, DIABETIC 31 GAUGE X 5/16" NDLE    BD Ultra-Fine Short Pen Needle 31 gauge x 5/16"   USE 1 PEN NEEDLE TO INJECT INSULIN ONCE DAILY    POLYETHYLENE GLYCOL (GLYCOLAX) 17 GRAM/DOSE POWDER    polyethylene glycol 3350 17 gram/dose oral powder    POTASSIUM CHLORIDE (KLOR-CON) 8 MEQ TBSR    Take 1 tablet by mouth once daily.    PROMETHAZINE (PHENERGAN) 25 MG TABLET    TAKE 1 TABLET BY MOUTH THREE TIMES DAILY AS NEEDED FOR NAUSEA    SUCRALFATE (CARAFATE) 1 GRAM TABLET    Take 1 tablet (1 g total) " by mouth 4 (four) times daily before meals and nightly. (lines the stomach)    TADALAFIL (CIALIS) 20 MG TAB    Take 1 tablet (20 mg total) by mouth Every 3 (three) days. for 10 days   Changed and/or Refilled Medications    Modified Medication Previous Medication    DULOXETINE (CYMBALTA) 60 MG CAPSULE DULoxetine (CYMBALTA) 60 MG capsule       Take 1 capsule by mouth once daily    Take 1 capsule by mouth once daily            Follow up in about 1 month (around 10/22/2022) for Sally Garg, MSN, APRN, FNP-C.    Sally Garg APRN, MSN, FNP-C

## 2022-09-29 ENCOUNTER — CLINICAL SUPPORT (OUTPATIENT)
Dept: REHABILITATION | Facility: HOSPITAL | Age: 52
End: 2022-09-29
Attending: FAMILY MEDICINE
Payer: MEDICAID

## 2022-09-29 DIAGNOSIS — R29.898 DECREASED STRENGTH OF UPPER EXTREMITY: ICD-10-CM

## 2022-09-29 DIAGNOSIS — M25.611 DECREASED ROM OF RIGHT SHOULDER: Primary | ICD-10-CM

## 2022-09-29 DIAGNOSIS — R29.3 POOR POSTURE: ICD-10-CM

## 2022-09-29 PROCEDURE — 97110 THERAPEUTIC EXERCISES: CPT | Mod: PO

## 2022-09-29 NOTE — PROGRESS NOTES
"    [facility]   Physical Therapy Treatment Note     Name: Haseeb Reilly  [facility] Number: [id number]    Therapy Diagnosis:   Encounter Diagnoses   Name Primary?    Decreased ROM of right shoulder Yes    Decreased strength of upper extremity     Poor posture          Physician: Aman Patino MD    Visit Date: 9/29/2022    Physician Orders: PT Eval only and submit Treat   Medical Diagnosis from Referral: M25.511,G89.29 (ICD-10-CM) - Chronic right shoulder pain  Evaluation Date: 8/16/2022  Authorization Period Expiration: 12/31/22  Plan of Care Expiration: 10/11/22  Visit # / Visits authorized: 6/16  FOTO: 5/10    PTA Visit #: 1/5     Time In: 2:35 pm  Time Out: 3:10 pm  Total Billable Time: 45 minutes    Precautions: Standard and Diabetes, hypertension, polyneuropathy,falls     SUBJECTIVE     Pt reports: "I'm hanging in there". Reports that the pain has been manageable. He also notes that he has made plans to see a psychologist.     He was compliant with home exercise program.  Response to previous treatment:   Functional change: Ongoing    Pain:  Not assessed/10 shoulder/neck     Location: right neck and shoulder     OBJECTIVE       Treatment     Haseeb received the treatments listed below:      therapeutic exercises to develop strength, endurance, ROM, flexibility, posture and core stabilization for 45 minutes including:  UBE 3/3   Sidelying trio - 0# at comfortable range  Supine cervical rotaiton with gentle overpressure 2x10   Seated chin tucks with over pressure - x20   Shoulder ER/IR iso walkouts - x10   Pain neuroscience education - 10 mins     manual therapy techniques: Joint mobilizations, Myofacial release and Soft tissue Mobilization were applied to the: Right shoulder for 00 minutes, including:      Patient Education and Home Exercises     Home Exercises Provided and Patient Education Provided     Education provided:   Pain neuroscience education with educational youtube " "videos entitled "Tame the beast" and "understanding pain in 5 minutes and what to do about it"   Home Exercise Program.    Written Home Exercises Provided: Patient instructed to cont prior HEP. Exercises were reviewed and Haseeb was able to demonstrate them prior to the end of the session.  Haseeb demonstrated good  understanding of the education provided. See EMR under Patient Instructions for exercises provided during therapy sessions    ASSESSMENT     Haseeb tolerated to session much better today. We were able to find shoulder and neck exercises that were tolerable and issued this for HEP. We also discussed pain neuroscience including a discussion on "mindfulness", "exercise", "diet", and "sleep". Also watched the video "tame the beast" on youtube for a further explanation on pain. Will continue to benefit from a progression of the plan that was given today.     Haseeb Is progressing well towards his goals.   Pt prognosis is Fair.     Pt will continue to benefit from skilled outpatient physical therapy to address the deficits listed in the problem list box on initial evaluation, provide pt/family education and to maximize pt's level of independence in the home and community environment. Pt's spiritual, cultural and educational needs considered and pt agreeable to plan of care and goals.     Anticipated barriers to physical therapy: chronicity of neck and back pain, medication side effects    Goals:  Short Term Goals (4 Weeks):   1. Pt will be compliant with HEP to assist PT treatment in restoring pain free motion of the R shoulder. (Ongoing, not met)  2. Pt will improve impaired shoulder MMTs 1/2 grade B to improve strength for functional tasks. (Ongoing, not met)  3. Pt will improve right  shoulder flexion to >/= 20 deg to improve functional mobility of UEs. (Ongoing, not met)  4. Pt will improve right  shoulder abduction to >/= 20 deg to improve functional mobility of UEs (Ongoing, not met)     Long Term " Goals (8 Weeks):   1. Pt will improve FOTO score to </= 38% to demonstrate improvements in carrying, moving, and handling objects (Ongoing, not met)  2. Pt will improve impaired shoulder MMTs 1 grade B to improve strength for household duties. (Ongoing, not met)  3. Pt will improve right  shoulder flexion to >/= 150 deg to improve functional mobility of UEs to improve reach into cabinet (Ongoing, not met)  4. Pt will improve right  shoulder abduction to >/= 150 deg to improve functional mobility of UEs to improve dressing. (Ongoing, not met)    PLAN     Cont with pain neuroscience education, graded exposure, and progressive strengthening.     Plan of care Certification: 8/16/2022 to 10/11/22.    Samsno Arce, PT , DPT

## 2022-10-01 DIAGNOSIS — E11.9 TYPE 2 DIABETES MELLITUS WITHOUT COMPLICATION, WITHOUT LONG-TERM CURRENT USE OF INSULIN: ICD-10-CM

## 2022-10-01 RX ORDER — METFORMIN HYDROCHLORIDE 1000 MG/1
1000 TABLET ORAL 2 TIMES DAILY WITH MEALS
Qty: 180 TABLET | Refills: 0 | Status: CANCELLED | OUTPATIENT
Start: 2022-10-01

## 2022-10-04 ENCOUNTER — CLINICAL SUPPORT (OUTPATIENT)
Dept: REHABILITATION | Facility: HOSPITAL | Age: 52
End: 2022-10-04
Payer: MEDICAID

## 2022-10-04 DIAGNOSIS — M25.611 DECREASED ROM OF RIGHT SHOULDER: Primary | ICD-10-CM

## 2022-10-04 DIAGNOSIS — R29.898 DECREASED STRENGTH OF UPPER EXTREMITY: ICD-10-CM

## 2022-10-04 DIAGNOSIS — R29.3 POOR POSTURE: ICD-10-CM

## 2022-10-04 PROCEDURE — 97110 THERAPEUTIC EXERCISES: CPT | Mod: PO

## 2022-10-04 NOTE — PROGRESS NOTES
"    [facility]   Physical Therapy Treatment Note     Name: Haseeb Tommie  [facility] Number: [id number]    Therapy Diagnosis:   Encounter Diagnoses   Name Primary?    Decreased ROM of right shoulder Yes    Decreased strength of upper extremity     Poor posture            Physician: Aman Patino MD    Visit Date: 10/4/2022    Physician Orders: PT Eval only and submit Treat   Medical Diagnosis from Referral: M25.511,G89.29 (ICD-10-CM) - Chronic right shoulder pain  Evaluation Date: 8/16/2022  Authorization Period Expiration: 12/31/22  Plan of Care Expiration: 10/11/22  Visit # / Visits authorized: 7/16  FOTO: 5/10    PTA Visit #: 1/5     Time In: 2:25 pm  Time Out: 3:10 pm  Total Billable Time: 45 minutes    Precautions: Standard and Diabetes, hypertension, polyneuropathy,falls     SUBJECTIVE     Pt reports: Doing much better today. Thinks that the current interventions are helpful.     He was compliant with home exercise program.  Response to previous treatment:   Functional change: Ongoing    Pain:  2/10 shoulder/neck     Location: right neck and shoulder     OBJECTIVE       Treatment     Haseeb received the treatments listed below:      therapeutic exercises to develop strength, endurance, ROM, flexibility, posture and core stabilization for 45 minutes including:  UBE 3/3   Sidelying trio - 1# at comfortable range  Supine cervical rotaiton with gentle overpressure 2x10   Shoulder ER/IR iso walkouts - x10     Every minute on the minute for 12 mins:  Minute 1 - Cervical Iso all directions   Minute 2 - Seated chin tucks with over pressure - x20   Minute 3 - Bent over rows  Minute 4 - Wall pushup          Patient Education and Home Exercises     Home Exercises Provided and Patient Education Provided     Education provided:   Pain neuroscience education with educational youtube videos entitled "Tame the beast" and "understanding pain in 5 minutes and what to do about it"   Home Exercise " Program.    Written Home Exercises Provided: Patient instructed to cont prior HEP. Exercises were reviewed and Haseeb was able to demonstrate them prior to the end of the session.  Haseeb demonstrated good  understanding of the education provided. See EMR under Patient Instructions for exercises provided during therapy sessions    ASSESSMENT     Haseeb is responding well to the current plan as this was his lowest day of pain yet. He seems to have a more positive attitude and is understanding his pain and how to cope with it. Will continue to progress exercises that are tolerable and continue with pain neuroscience education.     Haseeb Is progressing well towards his goals.   Pt prognosis is Fair.     Pt will continue to benefit from skilled outpatient physical therapy to address the deficits listed in the problem list box on initial evaluation, provide pt/family education and to maximize pt's level of independence in the home and community environment. Pt's spiritual, cultural and educational needs considered and pt agreeable to plan of care and goals.     Anticipated barriers to physical therapy: chronicity of neck and back pain, medication side effects    Goals:  Short Term Goals (4 Weeks):   1. Pt will be compliant with HEP to assist PT treatment in restoring pain free motion of the R shoulder. (Ongoing, not met)  2. Pt will improve impaired shoulder MMTs 1/2 grade B to improve strength for functional tasks. (Ongoing, not met)  3. Pt will improve right  shoulder flexion to >/= 20 deg to improve functional mobility of UEs. (Ongoing, not met)  4. Pt will improve right  shoulder abduction to >/= 20 deg to improve functional mobility of UEs (Ongoing, not met)     Long Term Goals (8 Weeks):   1. Pt will improve FOTO score to </= 38% to demonstrate improvements in carrying, moving, and handling objects (Ongoing, not met)  2. Pt will improve impaired shoulder MMTs 1 grade B to improve strength for household duties.  (Ongoing, not met)  3. Pt will improve right  shoulder flexion to >/= 150 deg to improve functional mobility of UEs to improve reach into cabinet (Ongoing, not met)  4. Pt will improve right  shoulder abduction to >/= 150 deg to improve functional mobility of UEs to improve dressing. (Ongoing, not met)    PLAN     Cont with pain neuroscience education, graded exposure, and progressive strengthening.     Plan of care Certification: 8/16/2022 to 10/11/22.    Samson Arce, PT , DPT

## 2022-10-05 DIAGNOSIS — E11.9 TYPE 2 DIABETES MELLITUS WITHOUT COMPLICATION: ICD-10-CM

## 2022-10-06 ENCOUNTER — CLINICAL SUPPORT (OUTPATIENT)
Dept: REHABILITATION | Facility: HOSPITAL | Age: 52
End: 2022-10-06
Payer: MEDICAID

## 2022-10-06 DIAGNOSIS — R29.3 POOR POSTURE: ICD-10-CM

## 2022-10-06 DIAGNOSIS — R29.898 DECREASED STRENGTH OF UPPER EXTREMITY: ICD-10-CM

## 2022-10-06 DIAGNOSIS — M25.611 DECREASED ROM OF RIGHT SHOULDER: Primary | ICD-10-CM

## 2022-10-06 PROCEDURE — 97110 THERAPEUTIC EXERCISES: CPT | Mod: PO,CQ

## 2022-10-06 NOTE — PROGRESS NOTES
"    [facility]   Physical Therapy Treatment Note     Name: Haseeb Reilly  [facility] Number: [id number]    Therapy Diagnosis:   No diagnosis found.          Physician: Aman Patino MD    Visit Date: 10/6/2022    Physician Orders: PT Eval only and submit Treat   Medical Diagnosis from Referral: M25.511,G89.29 (ICD-10-CM) - Chronic right shoulder pain  Evaluation Date: 8/16/2022  Authorization Period Expiration: 12/31/22  Plan of Care Expiration: 10/11/22  Visit # / Visits authorized: 8/16  FOTO: 5/10    PTA Visit #: 1/5     Time In: 11:45 am  Time Out: 12:30 pm  Total Billable Time: 45 minutes    Precautions: Standard and Diabetes, hypertension, polyneuropathy,falls     SUBJECTIVE     Pt reports:     He was compliant with home exercise program.  Response to previous treatment:   Functional change: Ongoing    Pain:  2/10 shoulder/neck     Location: right neck and shoulder     OBJECTIVE       Treatment     Haseeb received the treatments listed below:      therapeutic exercises to develop strength, endurance, ROM, flexibility, posture and core stabilization for 45 minutes including:  UBE 3/3   Cervical Iso all directions 20x ea  Seated cervical rotaiton with gentle overpressure 2x10   Supine AAROM shoulder flexion with 3# dowel 2x10  +Supine Serratus punch 2x10  Sidelying trio - 1# at comfortable range  Scap squeezes  2x10  Shoulder ER/IR iso walkouts - x20    NP:  Seated chin tucks with over pressure - x20   Bent over rows  Wall pushup    Patient Education and Home Exercises     Home Exercises Provided and Patient Education Provided     Education provided:   Pain neuroscience education with educational youtube videos entitled "Tame the beast" and "understanding pain in 5 minutes and what to do about it"   Home Exercise Program.    Written Home Exercises Provided: Patient instructed to cont prior HEP. Exercises were reviewed and Haseeb was able to demonstrate them prior to the end of the " session.  Haseeb demonstrated good  understanding of the education provided. See EMR under Patient Instructions for exercises provided during therapy sessions    ASSESSMENT     Prior to session Haseeb reported 8/10 pain in right shoulder, however pt was agreeable to today's treatment. Pt experienced mod discomfort while performing therex, however it quickly subsided after rest break between reps. Overall Haseeb is progress fair towards, and will continue to be progressed pending pain.    Haseeb Is progressing well towards his goals.   Pt prognosis is Fair.     Pt will continue to benefit from skilled outpatient physical therapy to address the deficits listed in the problem list box on initial evaluation, provide pt/family education and to maximize pt's level of independence in the home and community environment. Pt's spiritual, cultural and educational needs considered and pt agreeable to plan of care and goals.     Anticipated barriers to physical therapy: chronicity of neck and back pain, medication side effects    Goals:  Short Term Goals (4 Weeks):   1. Pt will be compliant with HEP to assist PT treatment in restoring pain free motion of the R shoulder. (Ongoing, not met)  2. Pt will improve impaired shoulder MMTs 1/2 grade B to improve strength for functional tasks. (Ongoing, not met)  3. Pt will improve right  shoulder flexion to >/= 20 deg to improve functional mobility of UEs. (Ongoing, not met)  4. Pt will improve right  shoulder abduction to >/= 20 deg to improve functional mobility of UEs (Ongoing, not met)     Long Term Goals (8 Weeks):   1. Pt will improve FOTO score to </= 38% to demonstrate improvements in carrying, moving, and handling objects (Ongoing, not met)  2. Pt will improve impaired shoulder MMTs 1 grade B to improve strength for household duties. (Ongoing, not met)  3. Pt will improve right  shoulder flexion to >/= 150 deg to improve functional mobility of UEs to improve reach into  cabinet (Ongoing, not met)  4. Pt will improve right  shoulder abduction to >/= 150 deg to improve functional mobility of UEs to improve dressing. (Ongoing, not met)    PLAN     Cont with pain neuroscience education, graded exposure, and progressive strengthening.     Plan of care Certification: 8/16/2022 to 10/11/22.    Tamica Malone, PTA , DPT

## 2022-10-13 ENCOUNTER — CLINICAL SUPPORT (OUTPATIENT)
Dept: REHABILITATION | Facility: HOSPITAL | Age: 52
End: 2022-10-13
Payer: MEDICAID

## 2022-10-13 DIAGNOSIS — M25.611 DECREASED ROM OF RIGHT SHOULDER: Primary | ICD-10-CM

## 2022-10-13 DIAGNOSIS — R29.3 POOR POSTURE: ICD-10-CM

## 2022-10-13 DIAGNOSIS — R29.898 DECREASED STRENGTH OF UPPER EXTREMITY: ICD-10-CM

## 2022-10-13 PROCEDURE — 97110 THERAPEUTIC EXERCISES: CPT | Mod: PO,CQ

## 2022-10-13 NOTE — PROGRESS NOTES
"    [facility]   Physical Therapy Treatment Note     Name: Haseeb Tommie  [facility] Number: [id number]    Therapy Diagnosis:   Encounter Diagnoses   Name Primary?    Decreased ROM of right shoulder Yes    Decreased strength of upper extremity     Poor posture      Physician: Aman Patino MD    Visit Date: 10/13/2022    Physician Orders: PT Eval only and submit Treat   Medical Diagnosis from Referral: M25.511,G89.29 (ICD-10-CM) - Chronic right shoulder pain  Evaluation Date: 8/16/2022  Authorization Period Expiration: 12/31/22  Plan of Care Expiration: 10/11/22  Visit # / Visits authorized: 9/16  FOTO: 5/10    PTA Visit #: 2/5     Time In: 11:45 am  Time Out: 12:30 pm  Total Billable Time: 45 minutes    Precautions: Standard and Diabetes, hypertension, polyneuropathy,falls     SUBJECTIVE     Pt reports: no new complaints     He was compliant with home exercise program.  Response to previous treatment:   Functional change: Ongoing    Pain:  4/10 shoulder/neck     Location: right neck and shoulder     OBJECTIVE       Treatment     Haseeb received the treatments listed below:      therapeutic exercises to develop strength, endurance, ROM, flexibility, posture and core stabilization for 45 minutes including:  UBE 3/3 lvl 1.5  +Chin tucks with YTB 20x  +Cervical lateral flexion with YTB 20x  SNAG cervical rotation 10x10" hold  Supine AAROM shoulder flexion with dowel 2x10  Standing AAROM shoulder ABD with dowel 20x  Supine Serratus punch 2x10  Sidelying shoulder flex/ABD - 1# at comfortable range    NP:  Seated cervical rotaiton with gentle overpressure 2x10   Cervical Iso all directions 20x ea  Seated chin tucks with over pressure - x20   Bent over rows  Wall pushup  Scap squeezes  2x10  Shoulder ER/IR iso walkouts - x20  Patient Education and Home Exercises     Home Exercises Provided and Patient Education Provided     Education provided:   Pain neuroscience education with educational " "youtube videos entitled "Tame the beast" and "understanding pain in 5 minutes and what to do about it"   Home Exercise Program.    Written Home Exercises Provided: Patient instructed to cont prior HEP. Exercises were reviewed and Haseeb was able to demonstrate them prior to the end of the session.  Haseeb demonstrated good  understanding of the education provided. See EMR under Patient Instructions for exercises provided during therapy sessions    ASSESSMENT     Added resistant band to cervical therex which Haseeb tolerated well, however will follow up next treatment session before adding to HEP. We continue to focused on cervical mobility/strength and shoulder ROM/strength each session to improve pt functional changes. Overall Haseeb will continue to be progressing within treatment pending tolerance.     Haseeb Is progressing well towards his goals.   Pt prognosis is Fair.     Pt will continue to benefit from skilled outpatient physical therapy to address the deficits listed in the problem list box on initial evaluation, provide pt/family education and to maximize pt's level of independence in the home and community environment. Pt's spiritual, cultural and educational needs considered and pt agreeable to plan of care and goals.     Anticipated barriers to physical therapy: chronicity of neck and back pain, medication side effects    Goals:  Short Term Goals (4 Weeks):   1. Pt will be compliant with HEP to assist PT treatment in restoring pain free motion of the R shoulder. (Ongoing, not met)  2. Pt will improve impaired shoulder MMTs 1/2 grade B to improve strength for functional tasks. (Ongoing, not met)  3. Pt will improve right  shoulder flexion to >/= 20 deg to improve functional mobility of UEs. (Ongoing, not met)  4. Pt will improve right  shoulder abduction to >/= 20 deg to improve functional mobility of UEs (Ongoing, not met)     Long Term Goals (8 Weeks):   1. Pt will improve FOTO score to </= 38% to " demonstrate improvements in carrying, moving, and handling objects (Ongoing, not met)  2. Pt will improve impaired shoulder MMTs 1 grade B to improve strength for household duties. (Ongoing, not met)  3. Pt will improve right  shoulder flexion to >/= 150 deg to improve functional mobility of UEs to improve reach into cabinet (Ongoing, not met)  4. Pt will improve right  shoulder abduction to >/= 150 deg to improve functional mobility of UEs to improve dressing. (Ongoing, not met)    PLAN     Cont with pain neuroscience education, graded exposure, and progressive strengthening.     Plan of care Certification: 8/16/2022 to 10/11/22.    Tamica Malone, PTA , DPT

## 2022-10-18 ENCOUNTER — CLINICAL SUPPORT (OUTPATIENT)
Dept: REHABILITATION | Facility: HOSPITAL | Age: 52
End: 2022-10-18
Attending: FAMILY MEDICINE
Payer: MEDICAID

## 2022-10-18 DIAGNOSIS — R29.898 DECREASED STRENGTH OF UPPER EXTREMITY: ICD-10-CM

## 2022-10-18 DIAGNOSIS — R29.3 POOR POSTURE: ICD-10-CM

## 2022-10-18 DIAGNOSIS — M25.611 DECREASED ROM OF RIGHT SHOULDER: Primary | ICD-10-CM

## 2022-10-18 PROCEDURE — 97110 THERAPEUTIC EXERCISES: CPT | Mod: PO

## 2022-10-18 NOTE — PROGRESS NOTES
"    [facility]   Physical Therapy Treatment Note     Name: Haseeb Tommie  [facility] Number: [id number]    Therapy Diagnosis:   Encounter Diagnoses   Name Primary?    Decreased ROM of right shoulder Yes    Decreased strength of upper extremity     Poor posture        Physician: Aman Patino MD    Visit Date: 10/18/2022    Physician Orders: PT Eval only and submit Treat   Medical Diagnosis from Referral: M25.511,G89.29 (ICD-10-CM) - Chronic right shoulder pain  Evaluation Date: 8/16/2022  Authorization Period Expiration: 12/31/22  Plan of Care Expiration: NEW 12/31/22  Visit # / Visits authorized: 10/16  FOTO: 5/10    PTA Visit #: 2/5     Time In: 12:00 pm  Time Out: 12:45 pm  Total Billable Time: 45 minutes    Precautions: Standard and Diabetes, hypertension, polyneuropathy,falls     SUBJECTIVE     Pt reports: Shoulder is doing pretty good today. Yesterday was painful however.     He was compliant with home exercise program.  Response to previous treatment:   Functional change: Ongoing    Pain:  4/10 shoulder/neck     Location: right neck and shoulder     OBJECTIVE       Treatment     Haseeb received the treatments listed below:      therapeutic exercises to develop strength, endurance, ROM, flexibility, posture and core stabilization for 45 minutes including:  UBE 3/3 lvl 1.5  Chin tucks with RTB 20x  Cervical lateral flexion with RTB 20x  Sidelying trio 2#   Serratus wall slides foam - x15  Upper trap Lev II - 2x5   Every minute on the minute for 8 mins:  Minute 1 - Bent over rows 5#  Minute 2 - Wall pushup    Minute 3 - Bilateral shoulder extension at cables 7#  Minute 4 - Landmine press 4#      NP:  SNAG cervical rotation 10x10" hold  Supine AAROM shoulder flexion with dowel 2x10  Standing AAROM shoulder ABD with dowel 20x  Supine Serratus punch 2x10  Sidelying shoulder flex/ABD - 1# at comfortable range  Seated cervical rotaiton with gentle overpressure 2x10   Cervical Iso all " "directions 20x ea  Seated chin tucks with over pressure - x20   Shoulder ER/IR iso walkouts - x20  Patient Education and Home Exercises     Home Exercises Provided and Patient Education Provided     Education provided:   Pain neuroscience education with educational youtube videos entitled "Tame the beast" and "understanding pain in 5 minutes and what to do about it"   Home Exercise Program.    Written Home Exercises Provided: Patient instructed to cont prior HEP. Exercises were reviewed and Haseeb was able to demonstrate them prior to the end of the session.  Haseeb demonstrated good  understanding of the education provided. See EMR under Patient Instructions for exercises provided during therapy sessions    ASSESSMENT     Haseeb continues to tolerate all exercises well and progresses with each visit. Added several shoulder strengthening exercises to the program today. Difficulty with UT level II today but was able to complete 2x5 with minimal pain     Haseeb Is progressing well towards his goals.   Pt prognosis is Fair.     Pt will continue to benefit from skilled outpatient physical therapy to address the deficits listed in the problem list box on initial evaluation, provide pt/family education and to maximize pt's level of independence in the home and community environment. Pt's spiritual, cultural and educational needs considered and pt agreeable to plan of care and goals.     Anticipated barriers to physical therapy: chronicity of neck and back pain, medication side effects    Goals:  Short Term Goals (4 Weeks):   1. Pt will be compliant with HEP to assist PT treatment in restoring pain free motion of the R shoulder. (Ongoing, not met)  2. Pt will improve impaired shoulder MMTs 1/2 grade B to improve strength for functional tasks. (Ongoing, not met)  3. Pt will improve right  shoulder flexion to >/= 20 deg to improve functional mobility of UEs. (Ongoing, not met)  4. Pt will improve right  shoulder " abduction to >/= 20 deg to improve functional mobility of UEs (Ongoing, not met)     Long Term Goals (8 Weeks):   1. Pt will improve FOTO score to </= 38% to demonstrate improvements in carrying, moving, and handling objects (Ongoing, not met)  2. Pt will improve impaired shoulder MMTs 1 grade B to improve strength for household duties. (Ongoing, not met)  3. Pt will improve right  shoulder flexion to >/= 150 deg to improve functional mobility of UEs to improve reach into cabinet (Ongoing, not met)  4. Pt will improve right  shoulder abduction to >/= 150 deg to improve functional mobility of UEs to improve dressing. (Ongoing, not met)    PLAN     Cont with pain neuroscience education, graded exposure, and progressive strengthening.     Samson Arce, PT , DPT

## 2022-10-19 ENCOUNTER — PATIENT MESSAGE (OUTPATIENT)
Dept: NEUROLOGY | Facility: CLINIC | Age: 52
End: 2022-10-19
Payer: MEDICAID

## 2022-10-19 ENCOUNTER — LAB VISIT (OUTPATIENT)
Dept: LAB | Facility: HOSPITAL | Age: 52
End: 2022-10-19
Payer: MEDICAID

## 2022-10-19 DIAGNOSIS — E53.8 LOW SERUM VITAMIN B12: ICD-10-CM

## 2022-10-19 DIAGNOSIS — G62.9 POLYNEUROPATHY: ICD-10-CM

## 2022-10-19 PROCEDURE — 82607 VITAMIN B-12: CPT | Performed by: PHYSICIAN ASSISTANT

## 2022-10-19 PROCEDURE — 36415 COLL VENOUS BLD VENIPUNCTURE: CPT | Performed by: PHYSICIAN ASSISTANT

## 2022-10-19 PROCEDURE — 83921 ORGANIC ACID SINGLE QUANT: CPT | Performed by: PHYSICIAN ASSISTANT

## 2022-10-20 DIAGNOSIS — E53.8 LOW SERUM VITAMIN B12: ICD-10-CM

## 2022-10-20 LAB — VIT B12 SERPL-MCNC: 254 NG/L (ref 180–914)

## 2022-10-20 RX ORDER — CYANOCOBALAMIN 1000 UG/ML
INJECTION, SOLUTION INTRAMUSCULAR; SUBCUTANEOUS
Qty: 1 ML | Refills: 4 | Status: SHIPPED | OUTPATIENT
Start: 2022-10-20 | End: 2023-02-17

## 2022-10-21 ENCOUNTER — OFFICE VISIT (OUTPATIENT)
Dept: PRIMARY CARE CLINIC | Facility: CLINIC | Age: 52
End: 2022-10-21
Payer: MEDICAID

## 2022-10-21 ENCOUNTER — PATIENT OUTREACH (OUTPATIENT)
Dept: ADMINISTRATIVE | Facility: OTHER | Age: 52
End: 2022-10-21
Payer: MEDICAID

## 2022-10-21 VITALS
WEIGHT: 293 LBS | TEMPERATURE: 99 F | DIASTOLIC BLOOD PRESSURE: 89 MMHG | OXYGEN SATURATION: 98 % | HEIGHT: 71 IN | BODY MASS INDEX: 41.02 KG/M2 | SYSTOLIC BLOOD PRESSURE: 139 MMHG | HEART RATE: 92 BPM

## 2022-10-21 DIAGNOSIS — Z76.0 ENCOUNTER FOR MEDICATION REFILL: Primary | ICD-10-CM

## 2022-10-21 DIAGNOSIS — K21.9 GASTRIC REFLUX: Primary | ICD-10-CM

## 2022-10-21 DIAGNOSIS — K21.9 GASTRIC REFLUX: ICD-10-CM

## 2022-10-21 PROCEDURE — 3044F PR MOST RECENT HEMOGLOBIN A1C LEVEL <7.0%: ICD-10-PCS | Mod: CPTII,,, | Performed by: NURSE PRACTITIONER

## 2022-10-21 PROCEDURE — 99213 OFFICE O/P EST LOW 20 MIN: CPT | Mod: S$PBB,,, | Performed by: NURSE PRACTITIONER

## 2022-10-21 PROCEDURE — 3079F PR MOST RECENT DIASTOLIC BLOOD PRESSURE 80-89 MM HG: ICD-10-PCS | Mod: CPTII,,, | Performed by: NURSE PRACTITIONER

## 2022-10-21 PROCEDURE — 3008F PR BODY MASS INDEX (BMI) DOCUMENTED: ICD-10-PCS | Mod: CPTII,,, | Performed by: NURSE PRACTITIONER

## 2022-10-21 PROCEDURE — 4010F PR ACE/ARB THEARPY RXD/TAKEN: ICD-10-PCS | Mod: CPTII,,, | Performed by: NURSE PRACTITIONER

## 2022-10-21 PROCEDURE — 3008F BODY MASS INDEX DOCD: CPT | Mod: CPTII,,, | Performed by: NURSE PRACTITIONER

## 2022-10-21 PROCEDURE — 99999 PR PBB SHADOW E&M-EST. PATIENT-LVL V: ICD-10-PCS | Mod: PBBFAC,,, | Performed by: NURSE PRACTITIONER

## 2022-10-21 PROCEDURE — 3075F PR MOST RECENT SYSTOLIC BLOOD PRESS GE 130-139MM HG: ICD-10-PCS | Mod: CPTII,,, | Performed by: NURSE PRACTITIONER

## 2022-10-21 PROCEDURE — 99215 OFFICE O/P EST HI 40 MIN: CPT | Mod: PBBFAC,PN | Performed by: NURSE PRACTITIONER

## 2022-10-21 PROCEDURE — 99213 PR OFFICE/OUTPT VISIT, EST, LEVL III, 20-29 MIN: ICD-10-PCS | Mod: S$PBB,,, | Performed by: NURSE PRACTITIONER

## 2022-10-21 PROCEDURE — 4010F ACE/ARB THERAPY RXD/TAKEN: CPT | Mod: CPTII,,, | Performed by: NURSE PRACTITIONER

## 2022-10-21 PROCEDURE — 3079F DIAST BP 80-89 MM HG: CPT | Mod: CPTII,,, | Performed by: NURSE PRACTITIONER

## 2022-10-21 PROCEDURE — 3075F SYST BP GE 130 - 139MM HG: CPT | Mod: CPTII,,, | Performed by: NURSE PRACTITIONER

## 2022-10-21 PROCEDURE — 3044F HG A1C LEVEL LT 7.0%: CPT | Mod: CPTII,,, | Performed by: NURSE PRACTITIONER

## 2022-10-21 PROCEDURE — 99999 PR PBB SHADOW E&M-EST. PATIENT-LVL V: CPT | Mod: PBBFAC,,, | Performed by: NURSE PRACTITIONER

## 2022-10-21 RX ORDER — OMEPRAZOLE 40 MG/1
40 CAPSULE, DELAYED RELEASE ORAL DAILY
Qty: 90 CAPSULE | Refills: 1 | Status: SHIPPED | OUTPATIENT
Start: 2022-10-21 | End: 2023-05-23

## 2022-10-21 RX ORDER — PANTOPRAZOLE SODIUM 40 MG/1
40 TABLET, DELAYED RELEASE ORAL DAILY
Qty: 90 TABLET | Refills: 3 | Status: SHIPPED | OUTPATIENT
Start: 2022-10-21 | End: 2022-10-21

## 2022-10-21 NOTE — PROGRESS NOTES
CHW - Initial Contact    This Community Health Worker updated the Social Determinant of Health questionnaire with patient during clinic visit today.    Pt identified barriers of most importance are: Please see SDOH icons.    Referrals to community agencies completed with patient/caregiver consent outside of [facility] include: No referrals   Referrals were put through [facility] - no: No referrals   Support and Services: Housing   Other information discussed the patient needs / wants help with: The pt is seeking housing the [facility]. He has a 8 and 14 yr old. He is seeking an affordable 3 bed home.    Follow up required: Yes   Follow-up Outreach - Due: 10/27/2022

## 2022-10-21 NOTE — PROGRESS NOTES
Subjective:       Patient ID: Haseeb Reilly is a 52 y.o. male.    Chief Complaint: medication refill and reflux.    Mr. Haseeb Reilly is a 51 year old male, established with me, presents to the clinic for medication refill and reflux. PCP is Aman Patino.  Medical and surgical history in addition to problem list reviewed as listed below.    Gastroesophageal Reflux  He complains of early satiety, globus sensation, heartburn, nausea and water brash. This is a new problem. The current episode started 1 to 4 weeks ago. The problem occurs constantly. The problem has been unchanged. The heartburn duration is several minutes. The heartburn is located in the substernum. The heartburn is of mild intensity. The heartburn does not wake him from sleep. The heartburn does not limit his activity. The heartburn doesn't change with position. The symptoms are aggravated by certain foods and caffeine. There are no known risk factors. He has tried an antacid for the symptoms. The treatment provided no relief.     Past Medical History:   Diagnosis Date    Diabetes mellitus     Hypertension     Mixed hyperlipidemia         History reviewed. No pertinent surgical history.     History reviewed. No pertinent family history.    Social History     Tobacco Use   Smoking Status Never   Smokeless Tobacco Never       Social History     Social History Narrative    Not on file       Review of patient's allergies indicates:   Allergen Reactions    Morphine         Review of Systems   Constitutional:  Negative for chills and fever.   Gastrointestinal:  Positive for heartburn and nausea. Negative for vomiting.       Objective:        Vitals:    10/21/22 1029   BP: 139/89   Pulse: 92   Temp: 98.7 °F (37.1 °C)        Physical Exam  Constitutional:       Appearance: Normal appearance.   Pulmonary:      Effort: Pulmonary effort is normal.   Neurological:      Mental Status: He is alert and oriented to person, place, and time.       Assessment:        1. Encounter for medication refill    2. Gastric reflux        Plan:       Encounter for medication refill    Gastric reflux  -    Pantoprazole (PROTONIX) 40 MG tablet; Take 1 tablet (40 mg total) by mouth once daily. heartburn  Dispense: 90 tablet; Refill: 3       Medication List with Changes/Refills   New Medications    OMEPRAZOLE (PRILOSEC) 40 MG CAPSULE    Take 1 capsule (40 mg total) by mouth once daily. Acid reflux   Current Medications    AMITRIPTYLINE (ELAVIL) 25 MG TABLET    TAKE 1 TABLET    ATORVASTATIN (LIPITOR) 40 MG TABLET    Take 1 tablet (40 mg total) by mouth every evening. High cholesterol    [place] [facility] [place]    Accu-Chek Guide Me Glucose Meter   CHECK BLOOD GLUCOSE TWICE DAILY FOR DIABETES    CARTIA  MG CP24    TAKE 1 CAPSULE BY MOUTH ONCE DAILY FOR HIGH BLOOD PRESSURE    CHOLECALCIFEROL, VITAMIN D3, 1,250 MCG (50,000 UNIT) CAPSULE    Take 50,000 Units by mouth once a week.    CLONIDINE 0.2 MG/24 HR TD PTWK (CATAPRES) 0.2 MG/24 HR    1 patch to skin    CYANOCOBALAMIN 1,000 MCG/ML INJECTION    Inject 1 mL (1,000 mcg total) into the muscle every 14 (fourteen) days for 30 days, THEN 1 mL (1,000 mcg total) every 30 days.    CYCLOBENZAPRINE (FLEXERIL) 10 MG TABLET    Take 1 tablet (10 mg total) by mouth 3 (three) times daily.    DILTIAZEM (DILACOR XR) 120 MG CDCR    TAKE 1 CAPSULE BY MOUTH ONCE DAILY FOR HIGH BLOOD PRESSURE    DULOXETINE (CYMBALTA) 60 MG CAPSULE    Take 1 capsule by mouth once daily    FAMOTIDINE (PEPCID) 40 MG TABLET    Take 1 tablet (40 mg total) by mouth nightly as needed for Heartburn.    FUROSEMIDE (LASIX) 40 MG TABLET    TAKE 1 TABLET BY MOUTH ONCE DAILY FOR  WATER  PILL.    HYDROCODONE-ACETAMINOPHEN (NORCO) 5-325 MG PER TABLET    Take 1 tablet by mouth every 4 (four) hours as needed for Pain.    JARDIANCE 10 MG TABLET    TAKE 1 TABLET BY MOUTH ONCE DAILY FOR DIABETES    LANCETS (ACCU-CHEK FASTCLIX LANCET DRUM [facility] [place]    Accu-Chek Fastclix Lancet Drum   USE TO CHECK  "GLUCOSE TWICE DAILY FOR DIABETES    LINACLOTIDE (LINZESS) 145 MCG CAP CAPSULE    TAKE 1 CAPSULE BY MOUTH ONCE DAILY FOR CONSTIPATION    LISINOPRIL-HYDROCHLOROTHIAZIDE (PRINZIDE,ZESTORETIC) 20-25 MG TAB    Take 1 tablet by mouth once daily. High blood pressure    METFORMIN (GLUCOPHAGE) 1000 MG TABLET    TAKE 1 TABLET BY MOUTH TWICE DAILY WITH MEALS    METHOCARBAMOL (ROBAXIN) 750 MG TAB    1 tablet    METHOCARBAMOL (ROBAXIN) 750 MG TAB    1 tablet.    NAPROXEN (NAPROSYN) 500 MG TABLET    Take 1 tablet (500 mg total) by mouth 2 (two) times daily with meals.    PEN NEEDLE, DIABETIC (BD ULTRA-FINE SHORT PEN NEEDLE) 31 GAUGE X 5/16" NDLE    USE 1 PEN NEEDLE TO INJECT INSULIN ONCE DAILY.    PEN NEEDLE, DIABETIC 31 GAUGE X 1/4" NDLE    pen needle, diabetic 31 gauge x 1/4"   USE 1 PEN NEEDLE WITH VICTOZA ONCE DAILY    PEN NEEDLE, DIABETIC 31 GAUGE X 5/16" NDLE    BD Ultra-Fine Short Pen Needle 31 gauge x 5/16"   USE 1 PEN NEEDLE TO INJECT INSULIN ONCE DAILY.    PEN NEEDLE, DIABETIC 31 GAUGE X 5/16" NDLE    BD Ultra-Fine Short Pen Needle 31 gauge x 5/16"   USE 1 PEN NEEDLE TO INJECT INSULIN ONCE DAILY    POLYETHYLENE GLYCOL (GLYCOLAX) 17 GRAM/DOSE POWDER    polyethylene glycol 3350 17 gram/dose oral powder    POTASSIUM CHLORIDE (KLOR-CON) 8 MEQ TBSR    Take 1 tablet by mouth once daily.    PROMETHAZINE (PHENERGAN) 25 MG TABLET    TAKE 1 TABLET BY MOUTH THREE TIMES DAILY AS NEEDED FOR NAUSEA    SUCRALFATE (CARAFATE) 1 GRAM TABLET    Take 1 tablet (1 g total) by mouth 4 (four) times daily before meals and nightly. (lines the stomach)    TADALAFIL (CIALIS) 20 MG TAB    Take 1 tablet (20 mg total) by mouth Every 3 (three) days. for 10 days   Changed and/or Refilled Medications    Modified Medication Previous Medication    GABAPENTIN (NEURONTIN) 300 MG CAPSULE gabapentin (NEURONTIN) 300 MG capsule       Take 1 capsule (300 mg total) by mouth 3 (three) times daily.    Take 1 capsule (300 mg total) by mouth 3 (three) times daily.    " TRULICITY 3 MG/0.5 ML PEN INJECTOR dulaglutide (TRULICITY) 3 mg/0.5 mL pen injector       INJECT 3MG INTO THE SKIN EVERY 7 DAYS    INJECT 3MG INTO THE SKIN EVERY 7 DAYS   Discontinued Medications    PANTOPRAZOLE (PROTONIX) 40 MG TABLET    Take 1 tablet (40 mg total) by mouth once daily. heartburn            Follow up if symptoms worsen or fail to improve.    SHRUTHI Rosa, MSN, FNP-C

## 2022-10-25 ENCOUNTER — CLINICAL SUPPORT (OUTPATIENT)
Dept: REHABILITATION | Facility: HOSPITAL | Age: 52
End: 2022-10-25
Attending: FAMILY MEDICINE
Payer: MEDICAID

## 2022-10-25 DIAGNOSIS — R29.898 DECREASED STRENGTH OF UPPER EXTREMITY: ICD-10-CM

## 2022-10-25 DIAGNOSIS — R29.3 POOR POSTURE: ICD-10-CM

## 2022-10-25 DIAGNOSIS — M25.611 DECREASED ROM OF RIGHT SHOULDER: Primary | ICD-10-CM

## 2022-10-25 LAB — METHYLMALONATE SERPL-SCNC: 0.16 NMOL/ML

## 2022-10-25 PROCEDURE — 97110 THERAPEUTIC EXERCISES: CPT | Mod: PO

## 2022-10-25 NOTE — PROGRESS NOTES
"    [facility]   Physical Therapy Treatment Note     Name: Haseeb Reilly  [facility] Number: [id number]    Therapy Diagnosis:   Encounter Diagnoses   Name Primary?    Decreased ROM of right shoulder Yes    Decreased strength of upper extremity     Poor posture          Physician: Aman Patino MD    Visit Date: 10/25/2022    Physician Orders: PT Eval only and submit Treat   Medical Diagnosis from Referral: M25.511,G89.29 (ICD-10-CM) - Chronic right shoulder pain  Evaluation Date: 8/16/2022  Authorization Period Expiration: 12/31/22  Plan of Care Expiration: NEW 12/31/22  Visit # / Visits authorized: 11/16  FOTO: 5/10    PTA Visit #: 2/5     Time In: 12:00 pm  Time Out: 12:45 pm  Total Billable Time: 45 minutes    Precautions: Standard and Diabetes, hypertension, polyneuropathy,falls     SUBJECTIVE     Pt reports: Today he is a little flared up, thinks its the weather    He was compliant with home exercise program.  Response to previous treatment:   Functional change: Ongoing    Pain:  4/10 shoulder/neck     Location: right neck and shoulder     OBJECTIVE       Treatment     Haseeb received the treatments listed below:      therapeutic exercises to develop strength, endurance, ROM, flexibility, posture and core stabilization for 45 minutes including:  UBE 3/3 lvl 2.0  Cervical flex, ext, SB into physio x10   Sidelying trio 2# x15  Serratus wall slides foam - x15  Upper trap Lev II - 2x5     Every minute on the minute for 8 mins:  Minute 1 - Bent over rows 6#  Minute 2 - Wall pushup    Minute 3 - Bilateral shoulder extension at cables 10#  Minute 4 - Landmine press 5#      NP:  SNAG cervical rotation 10x10" hold  Supine AAROM shoulder flexion with dowel 2x10  Standing AAROM shoulder ABD with dowel 20x  Supine Serratus punch 2x10  Sidelying shoulder flex/ABD - 1# at comfortable range  Seated cervical rotaiton with gentle overpressure 2x10   Cervical Iso all directions 20x ea  Seated chin " "tucks with over pressure - x20   Shoulder ER/IR iso walkouts - x20  Patient Education and Home Exercises     Home Exercises Provided and Patient Education Provided     Education provided:   Pain neuroscience education with educational youtube videos entitled "Tame the beast" and "understanding pain in 5 minutes and what to do about it"   Home Exercise Program.    Written Home Exercises Provided: Patient instructed to cont prior HEP. Exercises were reviewed and Haseeb was able to demonstrate them prior to the end of the session.  Haseeb demonstrated good  understanding of the education provided. See EMR under Patient Instructions for exercises provided during therapy sessions    ASSESSMENT     Haseeb continues to tolerate all exercises well and progresses with each visit. Added a variation on neck strengthening today which was tolerated well by the patient.     Haseeb Is progressing well towards his goals.   Pt prognosis is Fair.     Pt will continue to benefit from skilled outpatient physical therapy to address the deficits listed in the problem list box on initial evaluation, provide pt/family education and to maximize pt's level of independence in the home and community environment. Pt's spiritual, cultural and educational needs considered and pt agreeable to plan of care and goals.     Anticipated barriers to physical therapy: chronicity of neck and back pain, medication side effects    Goals:  Short Term Goals (4 Weeks):   1. Pt will be compliant with HEP to assist PT treatment in restoring pain free motion of the R shoulder. (Ongoing, not met)  2. Pt will improve impaired shoulder MMTs 1/2 grade B to improve strength for functional tasks. (Ongoing, not met)  3. Pt will improve right  shoulder flexion to >/= 20 deg to improve functional mobility of UEs. (Ongoing, not met)  4. Pt will improve right  shoulder abduction to >/= 20 deg to improve functional mobility of UEs (Ongoing, not met)     Long Term Goals " (8 Weeks):   1. Pt will improve FOTO score to </= 38% to demonstrate improvements in carrying, moving, and handling objects (Ongoing, not met)  2. Pt will improve impaired shoulder MMTs 1 grade B to improve strength for household duties. (Ongoing, not met)  3. Pt will improve right  shoulder flexion to >/= 150 deg to improve functional mobility of UEs to improve reach into cabinet (Ongoing, not met)  4. Pt will improve right  shoulder abduction to >/= 150 deg to improve functional mobility of UEs to improve dressing. (Ongoing, not met)    PLAN     Cont with pain neuroscience education, graded exposure, and progressive strengthening.     Samson Arce, PT , DPT

## 2022-10-27 ENCOUNTER — CLINICAL SUPPORT (OUTPATIENT)
Dept: REHABILITATION | Facility: HOSPITAL | Age: 52
End: 2022-10-27
Attending: FAMILY MEDICINE
Payer: MEDICAID

## 2022-10-27 DIAGNOSIS — R29.898 DECREASED STRENGTH OF UPPER EXTREMITY: ICD-10-CM

## 2022-10-27 DIAGNOSIS — R29.3 POOR POSTURE: ICD-10-CM

## 2022-10-27 DIAGNOSIS — M25.611 DECREASED ROM OF RIGHT SHOULDER: Primary | ICD-10-CM

## 2022-10-27 PROCEDURE — 97110 THERAPEUTIC EXERCISES: CPT | Mod: PO,CQ

## 2022-10-27 NOTE — PROGRESS NOTES
"    [facility]   Physical Therapy Treatment Note     Name: Haseeb Tommie  [facility] Number: [id number]    Therapy Diagnosis:   Encounter Diagnoses   Name Primary?    Decreased ROM of right shoulder Yes    Decreased strength of upper extremity     Poor posture      Physician: Aman Patino MD    Visit Date: 10/27/2022  Physician Orders: PT Eval only and submit Treat   Medical Diagnosis from Referral: M25.511,G89.29 (ICD-10-CM) - Chronic right shoulder pain  Evaluation Date: 8/16/2022  Authorization Period Expiration: 12/31/22  Plan of Care Expiration: NEW 12/31/22  Visit # / Visits authorized: 12/16  FOTO: 5/10    PTA Visit #: 1/5     Time In: 11:45 am  Time Out: 12:24 pm   Total Billable Time: 39 minutes    Precautions: Standard and Diabetes, hypertension, polyneuropathy,falls     SUBJECTIVE     Pt reports: Increased back pain because of weather, but neck/shoulder is fine.     He was compliant with home exercise program.  Response to previous treatment:   Functional change: Ongoing    Pain:  2/10 shoulder/neck     Location: right neck and shoulder     OBJECTIVE       Treatment     Haseeb received the treatments listed below:      therapeutic exercises to develop strength, endurance, ROM, flexibility, posture and core stabilization for 39 minutes including:    UBE 3/3 lvl 2.5  Sidelying trio 2# 20x  Supine hand cuff YTB 2x10  Bent over rows 6# 20x nabil  Landmine press 5# 20x  Serratus wall slides foam - x20  Wall pushup 20x  Bilateral shoulder extension at cables 10# 20x    NP:  Upper trap Lev II - 2x5   Cervical flex, ext, SB into physio x10   SNAG cervical rotation 10x10" hold  Supine AAROM shoulder flexion with dowel 2x10  Standing AAROM shoulder ABD with dowel 20x  Supine Serratus punch 2x10  Sidelying shoulder flex/ABD - 1# at comfortable range  Seated cervical rotaiton with gentle overpressure 2x10   Cervical Iso all directions 20x ea  Seated chin tucks with over pressure - x20 " "  Shoulder ER/IR iso walkouts - x20  Patient Education and Home Exercises     Home Exercises Provided and Patient Education Provided     Education provided:   Pain neuroscience education with educational youtube videos entitled "Tame the beast" and "understanding pain in 5 minutes and what to do about it"   Home Exercise Program.    Written Home Exercises Provided: Patient instructed to cont prior HEP. Exercises were reviewed and Haseeb was able to demonstrate them prior to the end of the session.  Haseeb demonstrated good  understanding of the education provided. See EMR under Patient Instructions for exercises provided during therapy sessions    ASSESSMENT     Today treatment focused on improving periscapular strengthening which Haseeb tolerated well without any complaints of neck/shoulder pain. Overall Haseeb will continue to be progressing within treatment pending tolerance.     Haseeb Is progressing well towards his goals.   Pt prognosis is Fair.     Pt will continue to benefit from skilled outpatient physical therapy to address the deficits listed in the problem list box on initial evaluation, provide pt/family education and to maximize pt's level of independence in the home and community environment. Pt's spiritual, cultural and educational needs considered and pt agreeable to plan of care and goals.     Anticipated barriers to physical therapy: chronicity of neck and back pain, medication side effects    Goals:  Short Term Goals (4 Weeks):   1. Pt will be compliant with HEP to assist PT treatment in restoring pain free motion of the R shoulder. (Ongoing, not met)  2. Pt will improve impaired shoulder MMTs 1/2 grade B to improve strength for functional tasks. (Ongoing, not met)  3. Pt will improve right  shoulder flexion to >/= 20 deg to improve functional mobility of UEs. (Ongoing, not met)  4. Pt will improve right  shoulder abduction to >/= 20 deg to improve functional mobility of UEs (Ongoing, not " met)     Long Term Goals (8 Weeks):   1. Pt will improve FOTO score to </= 38% to demonstrate improvements in carrying, moving, and handling objects (Ongoing, not met)  2. Pt will improve impaired shoulder MMTs 1 grade B to improve strength for household duties. (Ongoing, not met)  3. Pt will improve right  shoulder flexion to >/= 150 deg to improve functional mobility of UEs to improve reach into cabinet (Ongoing, not met)  4. Pt will improve right  shoulder abduction to >/= 150 deg to improve functional mobility of UEs to improve dressing. (Ongoing, not met)    PLAN     Cont with pain neuroscience education, graded exposure, and progressive strengthening.     Tamica Malone, PTA

## 2022-11-07 ENCOUNTER — OFFICE VISIT (OUTPATIENT)
Dept: NEUROLOGY | Facility: CLINIC | Age: 52
End: 2022-11-07
Payer: MEDICAID

## 2022-11-07 VITALS
BODY MASS INDEX: 39.05 KG/M2 | DIASTOLIC BLOOD PRESSURE: 88 MMHG | SYSTOLIC BLOOD PRESSURE: 130 MMHG | WEIGHT: 280 LBS | HEART RATE: 109 BPM

## 2022-11-07 DIAGNOSIS — G62.9 POLYNEUROPATHY: ICD-10-CM

## 2022-11-07 DIAGNOSIS — M54.9 BACK PAIN, UNSPECIFIED BACK LOCATION, UNSPECIFIED BACK PAIN LATERALITY, UNSPECIFIED CHRONICITY: Primary | ICD-10-CM

## 2022-11-07 PROCEDURE — 99215 OFFICE O/P EST HI 40 MIN: CPT | Mod: PBBFAC | Performed by: PSYCHIATRY & NEUROLOGY

## 2022-11-07 PROCEDURE — 3044F HG A1C LEVEL LT 7.0%: CPT | Mod: CPTII,,, | Performed by: PSYCHIATRY & NEUROLOGY

## 2022-11-07 PROCEDURE — 4010F PR ACE/ARB THEARPY RXD/TAKEN: ICD-10-PCS | Mod: CPTII,,, | Performed by: PSYCHIATRY & NEUROLOGY

## 2022-11-07 PROCEDURE — 3008F BODY MASS INDEX DOCD: CPT | Mod: CPTII,,, | Performed by: PSYCHIATRY & NEUROLOGY

## 2022-11-07 PROCEDURE — 3075F SYST BP GE 130 - 139MM HG: CPT | Mod: CPTII,,, | Performed by: PSYCHIATRY & NEUROLOGY

## 2022-11-07 PROCEDURE — 3008F PR BODY MASS INDEX (BMI) DOCUMENTED: ICD-10-PCS | Mod: CPTII,,, | Performed by: PSYCHIATRY & NEUROLOGY

## 2022-11-07 PROCEDURE — 99999 PR PBB SHADOW E&M-EST. PATIENT-LVL V: ICD-10-PCS | Mod: PBBFAC,,, | Performed by: PSYCHIATRY & NEUROLOGY

## 2022-11-07 PROCEDURE — 99213 PR OFFICE/OUTPT VISIT, EST, LEVL III, 20-29 MIN: ICD-10-PCS | Mod: S$PBB,,, | Performed by: PSYCHIATRY & NEUROLOGY

## 2022-11-07 PROCEDURE — 3079F PR MOST RECENT DIASTOLIC BLOOD PRESSURE 80-89 MM HG: ICD-10-PCS | Mod: CPTII,,, | Performed by: PSYCHIATRY & NEUROLOGY

## 2022-11-07 PROCEDURE — 99213 OFFICE O/P EST LOW 20 MIN: CPT | Mod: S$PBB,,, | Performed by: PSYCHIATRY & NEUROLOGY

## 2022-11-07 PROCEDURE — 3075F PR MOST RECENT SYSTOLIC BLOOD PRESS GE 130-139MM HG: ICD-10-PCS | Mod: CPTII,,, | Performed by: PSYCHIATRY & NEUROLOGY

## 2022-11-07 PROCEDURE — 4010F ACE/ARB THERAPY RXD/TAKEN: CPT | Mod: CPTII,,, | Performed by: PSYCHIATRY & NEUROLOGY

## 2022-11-07 PROCEDURE — 3044F PR MOST RECENT HEMOGLOBIN A1C LEVEL <7.0%: ICD-10-PCS | Mod: CPTII,,, | Performed by: PSYCHIATRY & NEUROLOGY

## 2022-11-07 PROCEDURE — 99999 PR PBB SHADOW E&M-EST. PATIENT-LVL V: CPT | Mod: PBBFAC,,, | Performed by: PSYCHIATRY & NEUROLOGY

## 2022-11-07 PROCEDURE — 3079F DIAST BP 80-89 MM HG: CPT | Mod: CPTII,,, | Performed by: PSYCHIATRY & NEUROLOGY

## 2022-11-07 RX ORDER — GABAPENTIN 300 MG/1
300 CAPSULE ORAL 3 TIMES DAILY
Qty: 90 CAPSULE | Refills: 11 | Status: SHIPPED | OUTPATIENT
Start: 2022-11-07 | End: 2023-04-13

## 2022-11-07 RX ORDER — GABAPENTIN 300 MG/1
300 CAPSULE ORAL 3 TIMES DAILY
Qty: 90 CAPSULE | Refills: 11 | Status: SHIPPED | OUTPATIENT
Start: 2022-11-07 | End: 2022-11-07

## 2022-11-07 NOTE — PROGRESS NOTES
"[facility] - NEUROLOGY 7TH [place] [facility], [place]    Date: 11/7/22  Patient Name: Haseeb Reilly   MRN: [id number]   PCP: Aman Patino      Chief Complaint: Polyneuropathy  Subjective:     Interval History 11/7/2022:   I have reviewed all relevant medical history in epic. The patient presents for follow up regarding neuropathy of the bilateral arms. Previously the patient had a decreased B12 (276) on 8/02/2022 and the patient was given IM B12 to replenish. The patient states he is hurting a lot in all of his joints and he states that he is having concerns that he was "forgotten" discussed with the patient that his diabetes and b12 would like contribute to numbness and tingling. However, the patients complaint of pain in his back would not likely be due to this. Offered to refer the patient to pain management. The patient states that he was concerned for a stroke back in August with R sided weakness. The patient also states he has difficulty raising his arms above his head and difficulty with shoulder pain and back pain frequently. Patient EMG was WNL on the arm.       HPI:   Mr. Haseeb Reilly is a 51 y.o. male with HTN, HLD, and DM2 (last A1C 6.8) presenting for evaluation of polyneuropathy of the bilateral arms. Per chart review he was seen by neurosurgery for these complaints but it was determined not to be originating from the cervical spine. He is currently taking 60mg of cymbalta daily. Patient has received a nerve conduction study which showed:   A severe sensory polyneuropathy in bilateral upper extremities and in the right lower extremity. A diabetic polyneuropathy would typically include motor nerve abnormalities and so its difficult to attribute the abnormal findings in this EDX to the patients diabetes. Paraneoplastic panel evaluation that includes anti-Hu antibody and SPEP with immunofixation are recommended.     He states that the numbness of his feet began " about 2 years ago and he states his arms began getting numbness about a month ago. He denies any known thyroid problems. He states he has a decreased appetite and occasionally has to force himself to eat. He denies any history of gastric bypass however he is considering it. He denies red meat intake and state he mostly eats pork. He denies any well water consumption. He denies history of syphillis, hepatitis, or HIV. He states he rarely drinks alcohol and does not smoke. He has no history of cancer.       PAST MEDICAL HISTORY:  Past Medical History:   Diagnosis Date    Diabetes mellitus     Hypertension     Mixed hyperlipidemia        PAST SURGICAL HISTORY:  History reviewed. No pertinent surgical history.    CURRENT MEDS:  Current Outpatient Medications   Medication Sig Dispense Refill    amitriptyline (ELAVIL) 25 MG tablet TAKE 1 TABLET      atorvastatin (LIPITOR) 40 MG tablet Take 1 tablet (40 mg total) by mouth every evening. High cholesterol 90 tablet 3    blood-glucose meter Misc Accu-Chek Guide Me Glucose Meter   CHECK BLOOD GLUCOSE TWICE DAILY FOR DIABETES      CARTIA  mg Cp24 TAKE 1 CAPSULE BY MOUTH ONCE DAILY FOR HIGH BLOOD PRESSURE      cholecalciferol, vitamin D3, 1,250 mcg (50,000 unit) capsule Take 50,000 Units by mouth once a week.      cloNIDine 0.2 mg/24 hr td ptwk (CATAPRES) 0.2 mg/24 hr 1 patch to skin      cyanocobalamin 1,000 mcg/mL injection Inject 1 mL (1,000 mcg total) into the muscle every 14 (fourteen) days for 30 days, THEN 1 mL (1,000 mcg total) every 30 days. (Patient not taking: Reported on 10/21/2022) 1 mL 4    cyclobenzaprine (FLEXERIL) 10 MG tablet Take 1 tablet (10 mg total) by mouth 3 (three) times daily. 90 tablet 6    diltiaZEM (DILACOR XR) 120 MG CDCR TAKE 1 CAPSULE BY MOUTH ONCE DAILY FOR HIGH BLOOD PRESSURE      dulaglutide (TRULICITY) 3 mg/0.5 mL pen injector INJECT 3MG INTO THE SKIN EVERY 7 DAYS 4 pen 0    DULoxetine (CYMBALTA) 60 MG capsule Take 1 capsule by mouth  "once daily 90 capsule 0    famotidine (PEPCID) 40 MG tablet Take 1 tablet (40 mg total) by mouth nightly as needed for Heartburn. 90 tablet 3    furosemide (LASIX) 40 MG tablet TAKE 1 TABLET BY MOUTH ONCE DAILY FOR  WATER  PILL. 90 tablet 3    gabapentin (NEURONTIN) 300 MG capsule Take 1 capsule (300 mg total) by mouth 3 (three) times daily. 90 capsule 11    HYDROcodone-acetaminophen (NORCO) 5-325 mg per tablet Take 1 tablet by mouth every 4 (four) hours as needed for Pain. (Patient not taking: Reported on 10/21/2022) 10 tablet 0    JARDIANCE 10 mg tablet TAKE 1 TABLET BY MOUTH ONCE DAILY FOR DIABETES 90 tablet 0    lancets (ACCU-CHEK FASTCLIX LANCET DRUM [facility]) Accu-Chek Fastclix Lancet Drum   USE TO CHECK GLUCOSE TWICE DAILY FOR DIABETES      linaCLOtide (LINZESS) 145 mcg Cap capsule TAKE 1 CAPSULE BY MOUTH ONCE DAILY FOR CONSTIPATION 90 capsule 1    lisinopriL-hydrochlorothiazide (PRINZIDE,ZESTORETIC) 20-25 mg Tab Take 1 tablet by mouth once daily. High blood pressure 90 tablet 3    metFORMIN (GLUCOPHAGE) 1000 MG tablet TAKE 1 TABLET BY MOUTH TWICE DAILY WITH MEALS 180 tablet 0    methocarbamoL (ROBAXIN) 750 MG Tab 1 tablet      methocarbamoL (ROBAXIN) 750 MG Tab 1 tablet.      naproxen (NAPROSYN) 500 MG tablet Take 1 tablet (500 mg total) by mouth 2 (two) times daily with meals. 14 tablet 0    omeprazole (PRILOSEC) 40 MG capsule Take 1 capsule (40 mg total) by mouth once daily. Acid reflux 90 capsule 1    pen needle, diabetic (BD ULTRA-FINE SHORT PEN NEEDLE) 31 gauge x 5/16" Ndle USE 1 PEN NEEDLE TO INJECT INSULIN ONCE DAILY. 100 each 0    pen needle, diabetic 31 gauge x 1/4" Ndle pen needle, diabetic 31 gauge x 1/4"   USE 1 PEN NEEDLE WITH VICTOZA ONCE DAILY      pen needle, diabetic 31 gauge x 5/16" Ndle BD Ultra-Fine Short Pen Needle 31 gauge x 5/16"   USE 1 PEN NEEDLE TO INJECT INSULIN ONCE DAILY.      pen needle, diabetic 31 gauge x 5/16" Ndle BD Ultra-Fine Short Pen Needle 31 gauge x 5/16"   USE 1 PEN " NEEDLE TO INJECT INSULIN ONCE DAILY      polyethylene glycol (GLYCOLAX) 17 gram/dose powder polyethylene glycol 3350 17 gram/dose oral powder      potassium chloride (KLOR-CON) 8 MEQ TbSR Take 1 tablet by mouth once daily.      promethazine (PHENERGAN) 25 MG tablet TAKE 1 TABLET BY MOUTH THREE TIMES DAILY AS NEEDED FOR NAUSEA 60 tablet 0    sucralfate (CARAFATE) 1 gram tablet Take 1 tablet (1 g total) by mouth 4 (four) times daily before meals and nightly. (lines the stomach) 60 tablet 3    tadalafiL (CIALIS) 20 MG Tab Take 1 tablet (20 mg total) by mouth Every 3 (three) days. for 10 days 12 tablet 11     No current facility-administered medications for this visit.       ALLERGIES:  Review of patient's allergies indicates:   Allergen Reactions    Morphine        FAMILY HISTORY:  History reviewed. No pertinent family history.    SOCIAL HISTORY:  Social History     Tobacco Use    Smoking status: Never    Smokeless tobacco: Never   Substance Use Topics    Alcohol use: No    Drug use: No       Review of Systems:  Review of Systems   Constitutional:  Negative for chills, fever and weight loss.   HENT:  Negative for ear discharge, ear pain, hearing loss, sinus pain, sore throat and tinnitus.    Eyes:  Negative for blurred vision, double vision and photophobia.   Respiratory:  Negative for cough, shortness of breath and wheezing.    Cardiovascular:  Negative for chest pain, palpitations and orthopnea.   Gastrointestinal:  Negative for constipation, diarrhea, nausea and vomiting.   Genitourinary:  Negative for dysuria, frequency and urgency.   Musculoskeletal:  Positive for back pain and falls (Notes a fall when walking his dog who pulled him down). Negative for myalgias and neck pain.   Neurological:  Positive for tingling. Negative for seizures, loss of consciousness, weakness and headaches.          Objective:     Vitals:    11/07/22 1459   BP: 130/88   Pulse: 109   Weight: 127 kg (279 lb 15.8 oz)           NEUROLOGICAL  EXAMINATION:     MENTAL STATUS   Oriented to person, place, and time.   Level of consciousness: drowsy ,  alert    MOTOR EXAM     Strength   Strength 5/5 except as noted.        Patient had significant pain with deltoid use unable to examine the patient     Wrist flexion bilateral 4+ L and 4 R    APB Left 4    Hip Flexion 4+ Right  Hip Flexion 4 Left    Right Knee extension 4       REFLEXES     Reflexes   Right brachioradialis: 1+  Left brachioradialis: 1+  Right biceps: 1+  Left biceps: 1+  Right triceps: 1+  Left triceps: 1+  Right patellar reflex grade: trace.  Left patellar: 1+  Right achilles: 0  Left achilles: 0    SENSORY EXAM   Light touch normal.   Right leg vibration: decreased from toes  Left leg vibration: decreased from toes  Right leg pinprick: decreased from toes  Left leg pinprick: decreased from toes  Sensory deficit distribution on right: median       + Tinel on L side            Imaging:   MRI Cervical Spine without Contrast 9/10/21:   Impression:     Normal examination.    MRI Lumbar Spine 11/4/21:     Impression:     1. Degenerative changes of the lower lumbar spine detailed above.  Mild-to-moderate neural foraminal narrowing at L3-S1 overall similar to prior study.    Assessment:   Haseeb Reilly is a 51 y.o. male presenting for evaluation of bilateral upper and lower polyneuropathy.     Plan:     Placed referral to pain management and multiple referrals have also been placed    The patient would like to evaluate possible options for pain relief with therapies rather than medicaiton     A new prescription was sent in for the patient to receive gabapentin     Problem List Items Addressed This Visit          Neuro    Polyneuropathy    Relevant Medications    gabapentin (NEURONTIN) 300 MG capsule    Other Relevant Orders    Ambulatory referral/consult to Pain Clinic     Other Visit Diagnoses       Back pain, unspecified back location, unspecified back pain laterality, unspecified chronicity    -   Primary    Relevant Orders    DIGNA              I spent a total of 45 minutes on the day of the visit. This includes face to face time and non-face to face time preparing to see the patient (eg, review of tests), obtaining and/or reviewing separately obtained history, documenting clinical information in the electronic or other health record, independently interpreting results and communicating results to the patient/family/caregiver, or care coordinator.    OMAYRA Norris MD  [facility]

## 2022-11-10 ENCOUNTER — TELEPHONE (OUTPATIENT)
Dept: NEUROLOGY | Facility: CLINIC | Age: 52
End: 2022-11-10
Payer: COMMERCIAL

## 2022-11-10 ENCOUNTER — PATIENT MESSAGE (OUTPATIENT)
Dept: NEUROLOGY | Facility: CLINIC | Age: 52
End: 2022-11-10
Payer: COMMERCIAL

## 2022-11-10 NOTE — TELEPHONE ENCOUNTER
----- Message from Michelle Samson MA sent at 11/10/2022  1:07 PM CST -----  Regarding: FW: disability paperwork    ----- Message -----  From: Tremontana Chevalier  Sent: 11/10/2022  12:55 PM CST  To: [facility] Staff  Subject: disability paperwork                             Pt calling regarding disability paper work from 's office that states pt cannot work due to medical condition and medicine taking. Pt says that since this paperwork cannot be found he just needs a letter signed and dated by Dr Crum that states that he cannot work and why. Pls resepond to pt as soon as possible. Pt says court date is 15/15. Pls call pt @ [phone]

## 2022-11-16 ENCOUNTER — TELEPHONE (OUTPATIENT)
Dept: NEUROLOGY | Facility: CLINIC | Age: 52
End: 2022-11-16
Payer: COMMERCIAL

## 2022-11-16 NOTE — TELEPHONE ENCOUNTER
----- Message from Michelle Samson MA sent at 11/16/2022  4:29 PM CST -----  Regarding: FW: Disability Authorization  Contact: [phone]    ----- Message -----  From: Alyssa Barragan  Sent: 11/16/2022  11:07 AM CST  To: [facility] Willard Staff  Subject: Disability Authorization                         PT, because your office lost the paper, Pt needed signed for his Disability    Pt, would like to have the Visit, dated 06/30/2022, to be Printed out, along with my problem list, medication    Also the Pt needs a clearance Letter, to give to The disability Office Stating that, he can Not work do to all of Patients Medication and ailments that preclude him from working      Pt, is requesting an appoint to get this resolved, within 48 hour, Please    [phone]

## 2022-11-18 ENCOUNTER — TELEPHONE (OUTPATIENT)
Dept: NEUROLOGY | Facility: CLINIC | Age: 52
End: 2022-11-18
Payer: COMMERCIAL

## 2022-11-18 NOTE — TELEPHONE ENCOUNTER
----- Message from Michelle Samson MA sent at 11/18/2022  4:38 PM CST -----  Regarding: FW: pt advice  Contact: [phone]  You last talked to/communicated with him on 11/10  ----- Message -----  From: Lia Platt  Sent: 11/18/2022   2:46 PM CST  To: [facility] Staff  Subject: pt advice                                        Pt is requesting a call in regards to his disability forms. Please call to discuss further.

## 2022-11-28 ENCOUNTER — TELEPHONE (OUTPATIENT)
Dept: NEUROLOGY | Facility: CLINIC | Age: 52
End: 2022-11-28
Payer: COMMERCIAL

## 2022-11-28 ENCOUNTER — PATIENT OUTREACH (OUTPATIENT)
Dept: ADMINISTRATIVE | Facility: OTHER | Age: 52
End: 2022-11-28
Payer: COMMERCIAL

## 2022-11-28 NOTE — PROGRESS NOTES
CHW - Follow Up    This Community Health Worker completed a follow up visit with patient via telephone today.  Pt/Caregiver reported: The pt informed CHW that he goes to court on Dec 15 for hid disability claim. He would like to hold housing search at this time.  Community Health Worker provided: CHW encouraged the pt.  Follow up required: Yes   Follow-up Outreach - Due: 12/4/2022

## 2022-11-28 NOTE — TELEPHONE ENCOUNTER
----- Message from [place] sent at 11/28/2022 10:09 AM CST -----  Contact: [phone]  Pt is calling to talk to the staff about his paperwork for the dr to sign stating he can not work. Pt stated it has been since June and he has not gotten a response from the dr yet. He stated he dropped it off to the office.  Pt stated that he needs this for court coming up 12-15-22.  Pt would like a urgent call back.  Pt stated he is coming to the office tomorrow to get this paper work refilled so he can have it for court.  [phone]

## 2022-11-28 NOTE — TELEPHONE ENCOUNTER
I placed a call to patient, left detailed voicemail informing him that per Dr. Crum, pain management or neurosurgery are the ones who need to provide his disability letter, instead of Dr. Crum.  Asked him to contact me through portal if he had any further questions or concerns.

## 2022-11-29 ENCOUNTER — TELEPHONE (OUTPATIENT)
Dept: NEUROSURGERY | Facility: CLINIC | Age: 52
End: 2022-11-29

## 2022-11-29 NOTE — TELEPHONE ENCOUNTER
----- Message from [place] sent at 11/28/2022 10:09 AM CST -----  Contact: [phone]  Pt is calling to talk to the staff about his paperwork for the dr to sign stating he can not work. Pt stated it has been since June and he has not gotten a response from the dr yet. He stated he dropped it off to the office.  Pt stated that he needs this for court coming up 12-15-22.  Pt would like a urgent call back.  Pt stated he is coming to the office tomorrow to get this paper work refilled so he can have it for court.  [phone]

## 2022-11-29 NOTE — TELEPHONE ENCOUNTER
I returned the pt call regarding disability paperwork.Pt was very frustrated with not being seen since 4/11/22 and also a clear care plan has not been set from Neurology or Neurosurgery. I VU and informed the pt that I would speak with my supervisor.  ----- Message from Sandro Hartman sent at 11/29/2022 10:10 AM CST -----       Type: Patient Returning Call    Who Called: Patient   Who Left Message for Patient: NA    Does the patient know what this is regarding?: Patient needs a call back (urgent) regarding disability paperwork.      Would the patient rather a call back or a response via [facility]? Call  Best Call Back Number: [phone]  Additional Information:  Please assist, thank you!

## 2022-12-05 ENCOUNTER — CLINICAL SUPPORT (OUTPATIENT)
Dept: PRIMARY CARE CLINIC | Facility: CLINIC | Age: 52
End: 2022-12-05
Payer: MEDICAID

## 2022-12-05 DIAGNOSIS — E53.8 VITAMIN B12 DEFICIENCY: ICD-10-CM

## 2022-12-05 DIAGNOSIS — E53.8 VITAMIN B12 DEFICIENCY: Primary | ICD-10-CM

## 2022-12-05 DIAGNOSIS — G62.9 POLYNEUROPATHY: Primary | ICD-10-CM

## 2022-12-05 PROCEDURE — 96373 THER/PROPH/DIAG INJ IA: CPT | Mod: PBBFAC,PN

## 2022-12-05 PROCEDURE — 96372 THER/PROPH/DIAG INJ SC/IM: CPT | Mod: PBBFAC,PN

## 2022-12-05 RX ORDER — CYANOCOBALAMIN 1000 UG/ML
1000 INJECTION, SOLUTION INTRAMUSCULAR; SUBCUTANEOUS
Status: SHIPPED | OUTPATIENT
Start: 2022-12-05 | End: 2023-03-05

## 2022-12-05 RX ADMIN — CYANOCOBALAMIN 1000 MCG: 1000 INJECTION, SOLUTION INTRAMUSCULAR at 11:12

## 2022-12-05 NOTE — PROGRESS NOTES
Being prescribed vitamin B12 1000 mcg monthly.  Patient picks up prescription from pharmacy, however does not know how to administer himself.  New order placed for patient to get vitamin B12 injection here in clinic until February 2023, follow-up with neurology.  This is for vitamin B12 deficiency and neuropathy.

## 2022-12-20 ENCOUNTER — PATIENT OUTREACH (OUTPATIENT)
Dept: ADMINISTRATIVE | Facility: OTHER | Age: 52
End: 2022-12-20
Payer: COMMERCIAL

## 2022-12-20 NOTE — PROGRESS NOTES
CHW - Follow Up    This Community Health Worker completed a follow up visit with patient via telephone today.  Pt/Caregiver reported: The pt stated that he think that he won his disability case and would like CHW to help assist him find 1-2 bedrooms in [place] or [place].   Community Health Worker provided: CHW informed the pt that she will start to search for affordable housing.   Follow up required: Yes   Follow-up Outreach - Due: 12/26/2022

## 2023-01-04 ENCOUNTER — CLINICAL SUPPORT (OUTPATIENT)
Dept: PRIMARY CARE CLINIC | Facility: CLINIC | Age: 53
End: 2023-01-04
Payer: MEDICAID

## 2023-01-04 DIAGNOSIS — E53.8 VITAMIN B12 DEFICIENCY: Primary | ICD-10-CM

## 2023-01-04 PROCEDURE — 96373 THER/PROPH/DIAG INJ IA: CPT | Mod: PBBFAC,PN

## 2023-01-04 PROCEDURE — 96372 THER/PROPH/DIAG INJ SC/IM: CPT | Mod: PBBFAC,PN

## 2023-01-04 RX ADMIN — CYANOCOBALAMIN 1000 MCG: 1000 INJECTION, SOLUTION INTRAMUSCULAR at 04:01

## 2023-01-06 ENCOUNTER — PATIENT OUTREACH (OUTPATIENT)
Dept: ADMINISTRATIVE | Facility: OTHER | Age: 53
End: 2023-01-06
Payer: MEDICAID

## 2023-01-06 NOTE — PROGRESS NOTES
CHW - Follow Up    This Community Health Worker completed a follow up visit with patient via telephone today.  Pt/Caregiver reported: The pt stated that he thinks he won his disability case but is not sure. The pt asked if CHW can give him a call back on 1/23/23 so he can speak to his .  Community Health Worker provided: CHW encouraged the client.  Follow up required: Yes   Follow-up Outreach - Due: 1/12/2023

## 2023-01-18 ENCOUNTER — PATIENT OUTREACH (OUTPATIENT)
Dept: ADMINISTRATIVE | Facility: HOSPITAL | Age: 53
End: 2023-01-18
Payer: MEDICAID

## 2023-01-18 ENCOUNTER — PATIENT MESSAGE (OUTPATIENT)
Dept: ADMINISTRATIVE | Facility: HOSPITAL | Age: 53
End: 2023-01-18
Payer: MEDICAID

## 2023-01-25 ENCOUNTER — PATIENT OUTREACH (OUTPATIENT)
Dept: ADMINISTRATIVE | Facility: OTHER | Age: 53
End: 2023-01-25
Payer: MEDICAID

## 2023-01-25 NOTE — PROGRESS NOTES
CHW - Follow Up    This Community Health Worker completed a follow up visit with patient via telephone today.  Pt/Caregiver reported: The pt reported that he has not spoken to his  yet to receive SSA award letter so he can start apply for housing.   Community Health Worker provided: CHW encouraged the client   Follow up required: Yes   Follow-up Outreach - Due: 1/31/2023

## 2023-02-08 ENCOUNTER — OFFICE VISIT (OUTPATIENT)
Dept: PRIMARY CARE CLINIC | Facility: CLINIC | Age: 53
End: 2023-02-08
Payer: MEDICAID

## 2023-02-08 ENCOUNTER — PATIENT OUTREACH (OUTPATIENT)
Dept: ADMINISTRATIVE | Facility: OTHER | Age: 53
End: 2023-02-08
Payer: MEDICAID

## 2023-02-08 VITALS
HEART RATE: 90 BPM | DIASTOLIC BLOOD PRESSURE: 83 MMHG | SYSTOLIC BLOOD PRESSURE: 116 MMHG | HEIGHT: 71 IN | OXYGEN SATURATION: 97 % | TEMPERATURE: 98 F | WEIGHT: 287.81 LBS | BODY MASS INDEX: 40.29 KG/M2

## 2023-02-08 DIAGNOSIS — Z11.4 SCREENING FOR HIV (HUMAN IMMUNODEFICIENCY VIRUS): ICD-10-CM

## 2023-02-08 DIAGNOSIS — Z71.3 ENCOUNTER FOR WEIGHT LOSS COUNSELING: ICD-10-CM

## 2023-02-08 DIAGNOSIS — E66.01 CLASS 3 SEVERE OBESITY DUE TO EXCESS CALORIES WITH SERIOUS COMORBIDITY AND BODY MASS INDEX (BMI) OF 40.0 TO 44.9 IN ADULT: ICD-10-CM

## 2023-02-08 DIAGNOSIS — E78.5 HYPERLIPIDEMIA, UNSPECIFIED HYPERLIPIDEMIA TYPE: ICD-10-CM

## 2023-02-08 DIAGNOSIS — K59.04 CHRONIC IDIOPATHIC CONSTIPATION: ICD-10-CM

## 2023-02-08 DIAGNOSIS — E53.8 VITAMIN B12 DEFICIENCY: ICD-10-CM

## 2023-02-08 DIAGNOSIS — I10 ESSENTIAL HYPERTENSION: ICD-10-CM

## 2023-02-08 DIAGNOSIS — E11.9 TYPE 2 DIABETES MELLITUS WITHOUT COMPLICATION, WITHOUT LONG-TERM CURRENT USE OF INSULIN: ICD-10-CM

## 2023-02-08 DIAGNOSIS — Z00.00 GENERAL MEDICAL EXAMINATION: Primary | ICD-10-CM

## 2023-02-08 PROCEDURE — 99999 PR PBB SHADOW E&M-EST. PATIENT-LVL V: ICD-10-PCS | Mod: PBBFAC,,, | Performed by: NURSE PRACTITIONER

## 2023-02-08 PROCEDURE — 4010F ACE/ARB THERAPY RXD/TAKEN: CPT | Mod: CPTII,,, | Performed by: NURSE PRACTITIONER

## 2023-02-08 PROCEDURE — 3008F PR BODY MASS INDEX (BMI) DOCUMENTED: ICD-10-PCS | Mod: CPTII,,, | Performed by: NURSE PRACTITIONER

## 2023-02-08 PROCEDURE — 3074F SYST BP LT 130 MM HG: CPT | Mod: CPTII,,, | Performed by: NURSE PRACTITIONER

## 2023-02-08 PROCEDURE — 4010F PR ACE/ARB THEARPY RXD/TAKEN: ICD-10-PCS | Mod: CPTII,,, | Performed by: NURSE PRACTITIONER

## 2023-02-08 PROCEDURE — 99215 OFFICE O/P EST HI 40 MIN: CPT | Mod: S$PBB,,, | Performed by: NURSE PRACTITIONER

## 2023-02-08 PROCEDURE — 1159F MED LIST DOCD IN RCRD: CPT | Mod: CPTII,,, | Performed by: NURSE PRACTITIONER

## 2023-02-08 PROCEDURE — 3079F DIAST BP 80-89 MM HG: CPT | Mod: CPTII,,, | Performed by: NURSE PRACTITIONER

## 2023-02-08 PROCEDURE — 99215 PR OFFICE/OUTPT VISIT, EST, LEVL V, 40-54 MIN: ICD-10-PCS | Mod: S$PBB,,, | Performed by: NURSE PRACTITIONER

## 2023-02-08 PROCEDURE — 3079F PR MOST RECENT DIASTOLIC BLOOD PRESSURE 80-89 MM HG: ICD-10-PCS | Mod: CPTII,,, | Performed by: NURSE PRACTITIONER

## 2023-02-08 PROCEDURE — 3074F PR MOST RECENT SYSTOLIC BLOOD PRESSURE < 130 MM HG: ICD-10-PCS | Mod: CPTII,,, | Performed by: NURSE PRACTITIONER

## 2023-02-08 PROCEDURE — 1159F PR MEDICATION LIST DOCUMENTED IN MEDICAL RECORD: ICD-10-PCS | Mod: CPTII,,, | Performed by: NURSE PRACTITIONER

## 2023-02-08 PROCEDURE — 99999 PR PBB SHADOW E&M-EST. PATIENT-LVL V: CPT | Mod: PBBFAC,,, | Performed by: NURSE PRACTITIONER

## 2023-02-08 PROCEDURE — 99215 OFFICE O/P EST HI 40 MIN: CPT | Mod: PBBFAC,PN | Performed by: NURSE PRACTITIONER

## 2023-02-08 PROCEDURE — 3008F BODY MASS INDEX DOCD: CPT | Mod: CPTII,,, | Performed by: NURSE PRACTITIONER

## 2023-02-08 NOTE — PROGRESS NOTES
Viral Upper Respiratory Illness (Adult)  You have a viral upper respiratory illness (URI), which is another term for the common cold. This illness is contagious during the first few days. It is spread through the air by coughing and sneezing. It may also be spread by direct contact (touching the sick person and then touching your own eyes, nose, or mouth). Frequent handwashing will decrease risk of spread. Most viral illnesses go away within 7 to 10 days with rest and simple home remedies. Sometimes the illness may last for several weeks. Antibiotics will not kill a virus, and they are generally not prescribed for this condition.    Home care  · If symptoms are severe, rest at home for the first 2 to 3 days. When you resume activity, don't let yourself get too tired.  · Avoid being exposed to cigarette smoke (yours or others).  · You may use acetaminophen or ibuprofen to control pain and fever, unless another medicine was prescribed. (Note: If you have chronic liver or kidney disease, have ever had a stomach ulcer or gastrointestinal bleeding, or are taking blood-thinning medicines, talk with your healthcare provider before using these medicines.) Aspirin should never be given to anyone under 18 years of age who is ill with a viral infection or fever. It may cause severe liver or brain damage.  · Your appetite may be poor, so a light diet is fine. Avoid dehydration by drinking 6 to 8 glasses of fluids per day (water, soft drinks, juices, tea, or soup). Extra fluids will help loosen secretions in the nose and lungs.  · Over-the-counter cold medicines will not shorten the length of time youre sick, but they may be helpful for the following symptoms: cough, sore throat, and nasal and sinus congestion. (Note: Do not use decongestants if you have high blood pressure.)  Follow-up care  Follow up with your healthcare provider, or as advised.  When to seek medical advice  Call your healthcare provider right away if any  Subjective:       Patient ID: Haseeb Reilly is a 52 y.o. male.    Chief Complaint: Follow-up    Mr. Haseeb Reilly is a 51 year old male, established with me, presents to the clinic for general medical examination and referral.  PCP is Aman Patino.  Medical and surgical history in addition to problem list reviewed as listed below.    Presents for general medical examination, referral for bariatric surgery [facility].      Past Medical History:   Diagnosis Date    Diabetes mellitus     Hypertension     Mixed hyperlipidemia         History reviewed. No pertinent surgical history.     History reviewed. No pertinent family history.    Social History     Tobacco Use   Smoking Status Never   Smokeless Tobacco Never       Social History     Social History Narrative    Not on file       Review of patient's allergies indicates:   Allergen Reactions    Morphine         Review of Systems   Constitutional:  Negative for fatigue and fever.   Respiratory:  Negative for shortness of breath.    Cardiovascular:  Negative for chest pain.   Gastrointestinal:  Positive for constipation. Negative for nausea and vomiting.   Musculoskeletal:  Positive for back pain.   Skin: Negative.    Neurological:  Negative for dizziness, light-headedness and headaches.       Objective:        Vitals:    02/08/23 1327   BP: 116/83   Pulse: 90   Temp: 98.4 °F (36.9 °C)        Physical Exam  Constitutional:       Appearance: He is obese.   Cardiovascular:      Pulses: Normal pulses.   Pulmonary:      Effort: Pulmonary effort is normal. No respiratory distress.   Abdominal:      General: Bowel sounds are normal. There is no distension.      Palpations: Abdomen is soft.      Tenderness: There is no abdominal tenderness. There is no guarding.   Musculoskeletal:         General: Tenderness (right lower extremity) present.   Skin:     General: Skin is warm and dry.      Capillary Refill: Capillary refill takes 2 to 3 seconds.    Neurological:      Mental Status: He is alert and oriented to person, place, and time.       Assessment:       1. General medical examination    2. Vitamin B12 deficiency    3. Type 2 diabetes mellitus without complication, without long-term current use of insulin    4. Hyperlipidemia, unspecified hyperlipidemia type    5. Chronic idiopathic constipation    6. Encounter for weight loss counseling    7. Screening for HIV (human immunodeficiency virus)    8. Essential hypertension    9. Class 3 severe obesity due to excess calories with serious comorbidity and body mass index (BMI) of 40.0 to 44.9 in adult        Plan:       General medical examination  -     CBC Auto Differential; Future; Expected date: 02/08/2023  -     Comprehensive Metabolic Panel; Future; Expected date: 02/08/2023    Vitamin B12 deficiency   10/19/2022  Vitamin   -     Vitamin B12; Future; Expected date: 02/08/2023    Type 2 diabetes mellitus without complication, without long-term current use of insulin  Controlled with Jardiance and Trulicity.  -     Hemoglobin A1C; Future; Expected date: 02/08/2023    Hyperlipidemia, unspecified hyperlipidemia type  Controlled with atorvastatin.  -     Lipid Panel; Future; Expected date: 02/08/2023    Chronic idiopathic constipation  Uncontrolled, no relief with Linzess.  -     Ambulatory referral/consult to Gastroenterology; Future; Expected date: 02/15/2023    Encounter for weight loss counseling  Referral to [facility] bariatric surgery.  -     Ambulatory referral/consult to Bariatric Surgery; Future; Expected date: 02/08/2023    Screening for HIV (human immunodeficiency virus)  -     HIV 1/2 Ag/Ab (4th Gen); Future; Expected date: 02/08/2023    Essential hypertension  Controlled with lisinopril-hydrochlorothiazide, clonidine, and furosemide.    Class 3 severe obesity due to excess calories with serious comorbidity and body mass index (BMI) of 40.0 to 44.9 in adult  Uncontrolled, recommend Mediterranean  of these occur:  · Cough with lots of colored sputum (mucus)  · Severe headache; face, neck, or ear pain  · Difficulty swallowing due to throat pain  · Fever of 100.4°F (38°C)  Call 911, or get immediate medical care  Call emergency services right away if any of these occur:  · Chest pain, shortness of breath, wheezing, or difficulty breathing  · Coughing up blood  · Inability to swallow due to throat pain  Date Last Reviewed: 9/13/2015  © 4170-9338 [employer]. [address] [place] [address]. All rights reserved. This information is not intended as a substitute for professional medical care. Always follow your healthcare professional's instructions.         diet, exercise 3 times a week for 30 minute intervals, increase as tolerated.  Nutritional consult.    Labs pending.    Chart review.    Health maintenance reviewed/updated.    Extensive teaching on Mediterranean diet and exercise to assist with weight management.    Encouraged to increase fiber, water consumption, consider Metamucil/Benefiber for constipation.      Medication List with Changes/Refills   Current Medications    AMITRIPTYLINE (ELAVIL) 25 MG TABLET    TAKE 1 TABLET    ATORVASTATIN (LIPITOR) 40 MG TABLET    Take 1 tablet (40 mg total) by mouth every evening. High cholesterol    BLOOD-GLUCOSE METER [facility]    Accu-Chek Guide Me Glucose Meter   CHECK BLOOD GLUCOSE TWICE DAILY FOR DIABETES    CARTIA  MG CP24    TAKE 1 CAPSULE BY MOUTH ONCE DAILY FOR HIGH BLOOD PRESSURE    CHOLECALCIFEROL, VITAMIN D3, 1,250 MCG (50,000 UNIT) CAPSULE    Take 50,000 Units by mouth once a week.    CLONIDINE 0.2 MG/24 HR TD PTWK (CATAPRES) 0.2 MG/24 HR    1 patch to skin    CYANOCOBALAMIN 1,000 MCG/ML INJECTION    Inject 1 mL (1,000 mcg total) into the muscle every 14 (fourteen) days for 30 days, THEN 1 mL (1,000 mcg total) every 30 days.    CYCLOBENZAPRINE (FLEXERIL) 10 MG TABLET    Take 1 tablet (10 mg total) by mouth 3 (three) times daily.    DILTIAZEM (DILACOR XR) 120 MG CDCR    TAKE 1 CAPSULE BY MOUTH ONCE DAILY FOR HIGH BLOOD PRESSURE    DULOXETINE (CYMBALTA) 60 MG CAPSULE    Take 1 capsule by mouth once daily    FAMOTIDINE (PEPCID) 40 MG TABLET    Take 1 tablet (40 mg total) by mouth nightly as needed for Heartburn.    FUROSEMIDE (LASIX) 40 MG TABLET    TAKE 1 TABLET BY MOUTH ONCE DAILY FOR  WATER  PILL.    GABAPENTIN (NEURONTIN) 300 MG CAPSULE    Take 1 capsule (300 mg total) by mouth 3 (three) times daily.    JARDIANCE 10 MG TABLET    TAKE 1 TABLET BY MOUTH ONCE DAILY FOR DIABETES    LANCETS (ACCU-CHEK FASTCLIX LANCET DRUM [facility])    Accu-Chek Fastclix Lancet Drum   USE TO CHECK GLUCOSE TWICE DAILY FOR DIABETES     "LINACLOTIDE (LINZESS) 145 MCG CAP CAPSULE    TAKE 1 CAPSULE BY MOUTH ONCE DAILY FOR CONSTIPATION    LISINOPRIL-HYDROCHLOROTHIAZIDE (PRINZIDE,ZESTORETIC) 20-25 MG TAB    TAKE 1 TABLET BY MOUTH ONCE DAILY FOR HIGH BLOOD PRESSURE    METFORMIN (GLUCOPHAGE) 1000 MG TABLET    TAKE 1 TABLET BY MOUTH TWICE DAILY WITH MEALS    OMEPRAZOLE (PRILOSEC) 40 MG CAPSULE    Take 1 capsule (40 mg total) by mouth once daily. Acid reflux    PEN NEEDLE, DIABETIC (BD ULTRA-FINE SHORT PEN NEEDLE) 31 GAUGE X 5/16" NDLE    USE 1 PEN NEEDLE TO INJECT INSULIN ONCE DAILY.    PEN NEEDLE, DIABETIC 31 GAUGE X 1/4" NDLE    pen needle, diabetic 31 gauge x 1/4"   USE 1 PEN NEEDLE WITH VICTOZA ONCE DAILY    PEN NEEDLE, DIABETIC 31 GAUGE X 5/16" NDLE    BD Ultra-Fine Short Pen Needle 31 gauge x 5/16"   USE 1 PEN NEEDLE TO INJECT INSULIN ONCE DAILY.    PEN NEEDLE, DIABETIC 31 GAUGE X 5/16" NDLE    BD Ultra-Fine Short Pen Needle 31 gauge x 5/16"   USE 1 PEN NEEDLE TO INJECT INSULIN ONCE DAILY    POLYETHYLENE GLYCOL (GLYCOLAX) 17 GRAM/DOSE POWDER    polyethylene glycol 3350 17 gram/dose oral powder    POTASSIUM CHLORIDE (KLOR-CON) 8 MEQ TBSR    Take 1 tablet by mouth once daily.    PROMETHAZINE (PHENERGAN) 25 MG TABLET    TAKE 1 TABLET BY MOUTH THREE TIMES DAILY AS NEEDED FOR NAUSEA    SUCRALFATE (CARAFATE) 1 GRAM TABLET    Take 1 tablet (1 g total) by mouth 4 (four) times daily before meals and nightly. (lines the stomach)    TADALAFIL (CIALIS) 20 MG TAB    Take 1 tablet (20 mg total) by mouth Every 3 (three) days. for 10 days    TRULICITY 3 MG/0.5 ML PEN INJECTOR    INJECT 3MG INTO THE SKIN EVERY 7 DAYS   Discontinued Medications    HYDROCODONE-ACETAMINOPHEN (NORCO) 5-325 MG PER TABLET    Take 1 tablet by mouth every 4 (four) hours as needed for Pain.    METHOCARBAMOL (ROBAXIN) 750 MG TAB    1 tablet    METHOCARBAMOL (ROBAXIN) 750 MG TAB    1 tablet.    NAPROXEN (NAPROSYN) 500 MG TABLET    Take 1 tablet (500 mg total) by mouth 2 (two) times daily with " meals.            Follow up if symptoms worsen or fail to improve.      I spent a total of 40 minutes on the day of the visit.  This includes face to face time and non-face to face time preparing to see the patient (eg, review of tests), obtaining and/or reviewing separately obtained history, documenting clinical information in the electronic or other health record, independently interpreting results and communicating results to the patient/family/caregiver, or care coordinator.     Sally Garg APRN, MSN, FNP-C

## 2023-02-08 NOTE — PROGRESS NOTES
CHW - Follow Up    This Community Health Worker completed a follow up visit with patient via telephone today.  Pt/Caregiver reported: The pt stated that he has not received his award letter for social security and would like CHW to look for homes in [facility] for $900 a month.  Community Health Worker provided: CHW informed the pt that she will search in that area.   Follow up required: Yes   Follow-up Outreach - Due: 2/14/2023

## 2023-02-23 ENCOUNTER — OFFICE VISIT (OUTPATIENT)
Dept: NEUROSURGERY | Facility: CLINIC | Age: 53
End: 2023-02-23
Payer: MEDICAID

## 2023-02-23 ENCOUNTER — PATIENT OUTREACH (OUTPATIENT)
Dept: ADMINISTRATIVE | Facility: OTHER | Age: 53
End: 2023-02-23
Payer: MEDICAID

## 2023-02-23 VITALS
WEIGHT: 287.94 LBS | HEIGHT: 71 IN | HEART RATE: 108 BPM | SYSTOLIC BLOOD PRESSURE: 121 MMHG | DIASTOLIC BLOOD PRESSURE: 84 MMHG | BODY MASS INDEX: 40.31 KG/M2

## 2023-02-23 DIAGNOSIS — G89.29 CHRONIC RIGHT-SIDED LOW BACK PAIN WITH RIGHT-SIDED SCIATICA: ICD-10-CM

## 2023-02-23 DIAGNOSIS — M47.26 OSTEOARTHRITIS OF SPINE WITH RADICULOPATHY, LUMBAR REGION: Primary | ICD-10-CM

## 2023-02-23 DIAGNOSIS — M54.41 CHRONIC RIGHT-SIDED LOW BACK PAIN WITH RIGHT-SIDED SCIATICA: ICD-10-CM

## 2023-02-23 DIAGNOSIS — G62.9 POLYNEUROPATHY: ICD-10-CM

## 2023-02-23 PROCEDURE — 3079F PR MOST RECENT DIASTOLIC BLOOD PRESSURE 80-89 MM HG: ICD-10-PCS | Mod: CPTII,,, | Performed by: NEUROLOGICAL SURGERY

## 2023-02-23 PROCEDURE — 1159F MED LIST DOCD IN RCRD: CPT | Mod: CPTII,,, | Performed by: NEUROLOGICAL SURGERY

## 2023-02-23 PROCEDURE — 3008F PR BODY MASS INDEX (BMI) DOCUMENTED: ICD-10-PCS | Mod: CPTII,,, | Performed by: NEUROLOGICAL SURGERY

## 2023-02-23 PROCEDURE — 4010F ACE/ARB THERAPY RXD/TAKEN: CPT | Mod: CPTII,,, | Performed by: NEUROLOGICAL SURGERY

## 2023-02-23 PROCEDURE — 3044F HG A1C LEVEL LT 7.0%: CPT | Mod: CPTII,,, | Performed by: NEUROLOGICAL SURGERY

## 2023-02-23 PROCEDURE — 99214 OFFICE O/P EST MOD 30 MIN: CPT | Mod: S$PBB,,, | Performed by: NEUROLOGICAL SURGERY

## 2023-02-23 PROCEDURE — 1160F RVW MEDS BY RX/DR IN RCRD: CPT | Mod: CPTII,,, | Performed by: NEUROLOGICAL SURGERY

## 2023-02-23 PROCEDURE — 4010F PR ACE/ARB THEARPY RXD/TAKEN: ICD-10-PCS | Mod: CPTII,,, | Performed by: NEUROLOGICAL SURGERY

## 2023-02-23 PROCEDURE — 99214 PR OFFICE/OUTPT VISIT, EST, LEVL IV, 30-39 MIN: ICD-10-PCS | Mod: S$PBB,,, | Performed by: NEUROLOGICAL SURGERY

## 2023-02-23 PROCEDURE — 3008F BODY MASS INDEX DOCD: CPT | Mod: CPTII,,, | Performed by: NEUROLOGICAL SURGERY

## 2023-02-23 PROCEDURE — 3074F PR MOST RECENT SYSTOLIC BLOOD PRESSURE < 130 MM HG: ICD-10-PCS | Mod: CPTII,,, | Performed by: NEUROLOGICAL SURGERY

## 2023-02-23 PROCEDURE — 99213 OFFICE O/P EST LOW 20 MIN: CPT | Mod: PBBFAC | Performed by: NEUROLOGICAL SURGERY

## 2023-02-23 PROCEDURE — 3044F PR MOST RECENT HEMOGLOBIN A1C LEVEL <7.0%: ICD-10-PCS | Mod: CPTII,,, | Performed by: NEUROLOGICAL SURGERY

## 2023-02-23 PROCEDURE — 99999 PR PBB SHADOW E&M-EST. PATIENT-LVL III: ICD-10-PCS | Mod: PBBFAC,,, | Performed by: NEUROLOGICAL SURGERY

## 2023-02-23 PROCEDURE — 3074F SYST BP LT 130 MM HG: CPT | Mod: CPTII,,, | Performed by: NEUROLOGICAL SURGERY

## 2023-02-23 PROCEDURE — 99999 PR PBB SHADOW E&M-EST. PATIENT-LVL III: CPT | Mod: PBBFAC,,, | Performed by: NEUROLOGICAL SURGERY

## 2023-02-23 PROCEDURE — 1160F PR REVIEW ALL MEDS BY PRESCRIBER/CLIN PHARMACIST DOCUMENTED: ICD-10-PCS | Mod: CPTII,,, | Performed by: NEUROLOGICAL SURGERY

## 2023-02-23 PROCEDURE — 1159F PR MEDICATION LIST DOCUMENTED IN MEDICAL RECORD: ICD-10-PCS | Mod: CPTII,,, | Performed by: NEUROLOGICAL SURGERY

## 2023-02-23 PROCEDURE — 3079F DIAST BP 80-89 MM HG: CPT | Mod: CPTII,,, | Performed by: NEUROLOGICAL SURGERY

## 2023-02-23 NOTE — PROGRESS NOTES
Neurosurgery  Established Patient    SUBJECTIVE:     History of Present Illness:  Mr. Reilly is a 52-year-old male who is seeing me today in follow-up.  His last neurosurgery clinic appointment was on April 11, 2022.  He complains of a chronic history of neck pain and back pain.  This started after a fall off of his truck at work.  From a low back pain standpoint, he complains of pain across his lower back.  He reports weakness and walking difficulty secondary to pain.  He complains of right-sided low back pain, right hip pain, and right leg pain which is worse with any type of activity.  From a neck standpoint he complains of neck pain and bilateral arm paresthesias.  An MRI of the cervical spine failed to show any significant central canal stenosis or neural foraminal narrowing throughout the cervical spine.  An MRI of the lumbar spine showed broad-based disc bulges at L4-5 and L5-S1 with mild neural foraminal narrowing, right-greater-than-left.  An EMG of both the upper and lower extremities showed a severe sensory polyneuropathy and bilateral upper extremities as well as the right lower extremity.  I did not feel that the MRIs explain the patient's symptoms.  Therefore, I had referred the patient to Dr. Crum of Neurology for workup of the polyneuropathy.  He is here today to see me in follow-up.  He was found to have a B12 deficiency and was started on B12 shots.  Otherwise no further reasons for the patient's paresthesias were found.  Currently, he continues to complain of neck pain, back pain, arm paresthesias, and leg pain as described above.  These symptoms have gotten worse since the time of his last clinic appointment.  He complains of weakness secondary to the pain.    Review of patient's allergies indicates:   Allergen Reactions    Morphine        Current Outpatient Medications   Medication Sig Dispense Refill    amitriptyline (ELAVIL) 25 MG tablet TAKE 1 TABLET      atorvastatin (LIPITOR) 40 MG  "tablet Take 1 tablet (40 mg total) by mouth every evening. High cholesterol 90 tablet 3    blood-glucose meter Misc Accu-Chek Guide Me Glucose Meter   CHECK BLOOD GLUCOSE TWICE DAILY FOR DIABETES      CARTIA  mg Cp24 TAKE 1 CAPSULE BY MOUTH ONCE DAILY FOR HIGH BLOOD PRESSURE      cholecalciferol, vitamin D3, 1,250 mcg (50,000 unit) capsule Take 50,000 Units by mouth once a week.      cloNIDine 0.2 mg/24 hr td ptwk (CATAPRES) 0.2 mg/24 hr 1 patch to skin      cyclobenzaprine (FLEXERIL) 10 MG tablet Take 1 tablet (10 mg total) by mouth 3 (three) times daily. 90 tablet 6    diltiaZEM (DILACOR XR) 120 MG CDCR TAKE 1 CAPSULE BY MOUTH ONCE DAILY FOR HIGH BLOOD PRESSURE      DULoxetine (CYMBALTA) 60 MG capsule Take 1 capsule by mouth once daily 90 capsule 0    famotidine (PEPCID) 40 MG tablet Take 1 tablet (40 mg total) by mouth nightly as needed for Heartburn. 90 tablet 3    furosemide (LASIX) 40 MG tablet TAKE 1 TABLET BY MOUTH ONCE DAILY FOR  WATER  PILL. 90 tablet 3    gabapentin (NEURONTIN) 300 MG capsule Take 1 capsule (300 mg total) by mouth 3 (three) times daily. 90 capsule 11    JARDIANCE 10 mg tablet TAKE 1 TABLET BY MOUTH ONCE DAILY FOR DIABETES 90 tablet 0    lancets (ACCU-CHEK FASTCLIX LANCET DRUM [facility]) Accu-Chek Fastclix Lancet Drum   USE TO CHECK GLUCOSE TWICE DAILY FOR DIABETES      linaCLOtide (LINZESS) 145 mcg Cap capsule TAKE 1 CAPSULE BY MOUTH ONCE DAILY FOR CONSTIPATION 90 capsule 1    lisinopriL-hydrochlorothiazide (PRINZIDE,ZESTORETIC) 20-25 mg Tab TAKE 1 TABLET BY MOUTH ONCE DAILY FOR HIGH BLOOD PRESSURE 90 tablet 3    metFORMIN (GLUCOPHAGE) 1000 MG tablet TAKE 1 TABLET BY MOUTH TWICE DAILY WITH MEALS 180 tablet 0    omeprazole (PRILOSEC) 40 MG capsule Take 1 capsule (40 mg total) by mouth once daily. Acid reflux 90 capsule 1    pen needle, diabetic (BD ULTRA-FINE SHORT PEN NEEDLE) 31 gauge x 5/16" Ndle USE 1 PEN NEEDLE TO INJECT INSULIN ONCE DAILY. 100 each 0    pen needle, diabetic 31 gauge " "x 1/4" Ndle pen needle, diabetic 31 gauge x 1/4"   USE 1 PEN NEEDLE WITH VICTOZA ONCE DAILY      pen needle, diabetic 31 gauge x 5/16" Ndle BD Ultra-Fine Short Pen Needle 31 gauge x 5/16"   USE 1 PEN NEEDLE TO INJECT INSULIN ONCE DAILY.      pen needle, diabetic 31 gauge x 5/16" Ndle BD Ultra-Fine Short Pen Needle 31 gauge x 5/16"   USE 1 PEN NEEDLE TO INJECT INSULIN ONCE DAILY      polyethylene glycol (GLYCOLAX) 17 gram/dose powder polyethylene glycol 3350 17 gram/dose oral powder      potassium chloride (KLOR-CON) 8 MEQ TbSR Take 1 tablet by mouth once daily.      promethazine (PHENERGAN) 25 MG tablet TAKE 1 TABLET BY MOUTH THREE TIMES DAILY AS NEEDED FOR NAUSEA 60 tablet 0    sucralfate (CARAFATE) 1 gram tablet Take 1 tablet (1 g total) by mouth 4 (four) times daily before meals and nightly. (lines the stomach) 60 tablet 3    TRULICITY 3 mg/0.5 mL pen injector INJECT 3MG INTO THE SKIN EVERY 7 DAYS 4 pen 3    tadalafiL (CIALIS) 20 MG Tab Take 1 tablet (20 mg total) by mouth Every 3 (three) days. for 10 days (Patient not taking: Reported on 2/8/2023) 12 tablet 11     Current Facility-Administered Medications   Medication Dose Route Frequency Provider Last Rate Last Admin    cyanocobalamin injection 1,000 mcg  1,000 mcg Intramuscular Q30 Days Aman Patino MD   1,000 mcg at 01/04/23 1623       Past Medical History:   Diagnosis Date    Diabetes mellitus     Hypertension     Mixed hyperlipidemia      History reviewed. No pertinent surgical history.  Family History    None       Social History     Socioeconomic History    Marital status:    Tobacco Use    Smoking status: Never    Smokeless tobacco: Never   Substance and Sexual Activity    Alcohol use: No    Drug use: No     Social Determinants of Health     Financial Resource Strain: High Risk    Difficulty of Paying Living Expenses: Hard   Food Insecurity: Food Insecurity Present    Worried About Running Out of Food in the Last Year: Sometimes true    Ran Out " "of Food in the Last Year: Sometimes true   Transportation Needs: Unmet Transportation Needs    Lack of Transportation (Medical): No    Lack of Transportation (Non-Medical): Yes   Physical Activity: Inactive    Days of Exercise per Week: 0 days    Minutes of Exercise per Session: 0 min   Stress: Stress Concern Present    Feeling of Stress : Rather much   Social Connections: Unknown    Frequency of Communication with Friends and Family: More than three times a week    Frequency of Social Gatherings with Friends and Family: More than three times a week    Attends [employer] Services: More than 4 times per year    Active Member of Clubs or Organizations: No    Attends Club or Organization Meetings: Never   Housing Stability: High Risk    Unable to Pay for Housing in the Last Year: Yes    Number of Places Lived in the Last Year: 2    Unstable Housing in the Last Year: Yes       Review of Systems    OBJECTIVE:     Vital Signs  Pulse: 108  BP: 121/84  Pain Score:   8  Height: 5' 11" (180.3 cm)  Weight: 130.6 kg (287 lb 14.7 oz)  Body mass index is 40.16 kg/m².    Physical Exam:  Vitals reviewed.    Constitutional: He appears well-developed and well-nourished. No distress.     Eyes: Pupils are equal, round, and reactive to light. Conjunctivae and EOM are normal.     Cardiovascular: Normal rate, regular rhythm, normal pulses and no edema.     Abdominal: Soft. Bowel sounds are normal.     Skin: Skin displays no rash on trunk and no rash on extremities. Skin displays no lesions on trunk and no lesions on extremities.     Psych/Behavior: He is alert. He is oriented to person, place, and time. He has a normal mood and affect.     Musculoskeletal: Gait is normal.        Neck: Range of motion is full. There is no tenderness. Muscle strength is 5/5. Tone is normal.        Back: Range of motion is full. There is no tenderness. Muscle strength is 5/5. Tone is normal.        Right Upper Extremities: Range of motion is full. There is no " tenderness. Muscle strength is 5/5. Tone is normal.        Left Upper Extremities: Range of motion is full. There is no tenderness. Muscle strength is 5/5. Tone is normal.       Right Lower Extremities: Range of motion is full. There is no tenderness. Muscle strength is 5/5. Tone is normal.        Left Lower Extremities: Range of motion is full. There is no tenderness. Muscle strength is 5/5. Tone is normal.     Neurological:        Coordination: He has a normal Romberg Test, normal finger to nose coordination and normal tandem walking coordination.        Sensory: There is no sensory deficit in the trunk. There is no sensory deficit in the extremities.        DTRs: DTRs are DTRS NORMAL AND SYMMETRICnormal and symmetric. He displays no Babinski's sign on the right side. He displays no Babinski's sign on the left side.        Cranial nerves: Cranial nerve(s) II, III, IV, V, VI, VII, VIII, IX, X, XI and XII are intact.       Diagnostic Results:  He has no updated imaging studies available for review.    ASSESSMENT/PLAN:     Mr. Reilly is a 52-year-old male who complains of neck pain, bilateral arm paresthesias, low back pain, and leg pain as described above.  His MRI of the cervical spine failed to show any significant cervical stenosis or neuroforaminal narrowing.  His MRI of the lumbar spine shows mild lumbar spondylosis at the L4-5 and L5-S1 levels.  His pain is out of proportion to his MRI findings.  An EMG did show a sensory polyneuropathy.  He was found to be B12 deficient and was treated for this.  However, his symptoms still persist.  Unfortunately, based on his imaging studies and neurophysiologic findings, I do not believe that the patient's complaints are surgical in nature.  I explained my findings to the patient in detail.  I have encouraged him to continue follow-up with Neurology for his paresthesias.  From a pain standpoint, I believe the patient would benefit from physical therapy and possible  epidural steroid injections.  I have given him a referral to pain management today.  I do not believe the patient will benefit from neurosurgical intervention.  Therefore, I will see him on an as-needed basis.  Knows he can call with any further questions or concerns in the meantime.        Note dictated with voice recognition software, please excuse any grammatical errors.

## 2023-02-27 ENCOUNTER — OFFICE VISIT (OUTPATIENT)
Dept: OPTOMETRY | Facility: CLINIC | Age: 53
End: 2023-02-27
Payer: MEDICAID

## 2023-02-27 DIAGNOSIS — E11.9 TYPE 2 DIABETES MELLITUS WITHOUT OPHTHALMIC MANIFESTATIONS: ICD-10-CM

## 2023-02-27 DIAGNOSIS — H10.13 ALLERGIC CONJUNCTIVITIS OF BOTH EYES: ICD-10-CM

## 2023-02-27 DIAGNOSIS — Z00.00 ANNUAL PHYSICAL EXAM: ICD-10-CM

## 2023-02-27 DIAGNOSIS — H52.4 PRESBYOPIA: ICD-10-CM

## 2023-02-27 DIAGNOSIS — I10 ESSENTIAL HYPERTENSION: ICD-10-CM

## 2023-02-27 DIAGNOSIS — E11.9 TYPE 2 DIABETES MELLITUS WITHOUT COMPLICATION, WITHOUT LONG-TERM CURRENT USE OF INSULIN: ICD-10-CM

## 2023-02-27 DIAGNOSIS — H47.093 OPTIC NERVE ASYMMETRY, BILATERAL: Primary | ICD-10-CM

## 2023-02-27 PROCEDURE — 99999 PR PBB SHADOW E&M-EST. PATIENT-LVL II: CPT | Mod: PBBFAC,,, | Performed by: OPTOMETRIST

## 2023-02-27 PROCEDURE — 92015 PR REFRACTION: ICD-10-PCS | Mod: ,,, | Performed by: OPTOMETRIST

## 2023-02-27 PROCEDURE — 99999 PR PBB SHADOW E&M-EST. PATIENT-LVL II: ICD-10-PCS | Mod: PBBFAC,,, | Performed by: OPTOMETRIST

## 2023-02-27 PROCEDURE — 92133 POSTERIOR SEGMENT OCT OPTIC NERVE(OCULAR COHERENCE TOMOGRAPHY) - OU - BOTH EYES: ICD-10-PCS | Mod: 26,S$PBB,, | Performed by: OPTOMETRIST

## 2023-02-27 PROCEDURE — 1159F PR MEDICATION LIST DOCUMENTED IN MEDICAL RECORD: ICD-10-PCS | Mod: CPTII,,, | Performed by: OPTOMETRIST

## 2023-02-27 PROCEDURE — 4010F ACE/ARB THERAPY RXD/TAKEN: CPT | Mod: CPTII,,, | Performed by: OPTOMETRIST

## 2023-02-27 PROCEDURE — 2023F DILAT RTA XM W/O RTNOPTHY: CPT | Mod: CPTII,,, | Performed by: OPTOMETRIST

## 2023-02-27 PROCEDURE — 92015 DETERMINE REFRACTIVE STATE: CPT | Mod: ,,, | Performed by: OPTOMETRIST

## 2023-02-27 PROCEDURE — 92133 CPTRZD OPH DX IMG PST SGM ON: CPT | Mod: PBBFAC | Performed by: OPTOMETRIST

## 2023-02-27 PROCEDURE — 3044F HG A1C LEVEL LT 7.0%: CPT | Mod: CPTII,,, | Performed by: OPTOMETRIST

## 2023-02-27 PROCEDURE — 3044F PR MOST RECENT HEMOGLOBIN A1C LEVEL <7.0%: ICD-10-PCS | Mod: CPTII,,, | Performed by: OPTOMETRIST

## 2023-02-27 PROCEDURE — 1159F MED LIST DOCD IN RCRD: CPT | Mod: CPTII,,, | Performed by: OPTOMETRIST

## 2023-02-27 PROCEDURE — 4010F PR ACE/ARB THEARPY RXD/TAKEN: ICD-10-PCS | Mod: CPTII,,, | Performed by: OPTOMETRIST

## 2023-02-27 PROCEDURE — 92004 PR EYE EXAM, NEW PATIENT,COMPREHESV: ICD-10-PCS | Mod: S$PBB,,, | Performed by: OPTOMETRIST

## 2023-02-27 PROCEDURE — 92004 COMPRE OPH EXAM NEW PT 1/>: CPT | Mod: S$PBB,,, | Performed by: OPTOMETRIST

## 2023-02-27 PROCEDURE — 99212 OFFICE O/P EST SF 10 MIN: CPT | Mod: PBBFAC | Performed by: OPTOMETRIST

## 2023-02-27 PROCEDURE — 2023F PR DILATED RETINAL EXAM W/O EVID OF RETINOPATHY: ICD-10-PCS | Mod: CPTII,,, | Performed by: OPTOMETRIST

## 2023-03-01 NOTE — PROGRESS NOTES
HPI    DLE:  11/2022 At [facility]    No eyedrops  No eye surgery     Pt here for routine eye exam. Pt states he has noticed some blurry VA OU   at distance and near OU since his last visit.  Pt states he has been   having flashes and floaters OU for years and appears stable. Pt states   tearing OU x 1 month.  Pt denies headaches or eye pain OU.  Pt denies   itching or burning OU.     Last edited by Selina Dunbar MA on 2/27/2023 11:33 AM.            Assessment /Plan     For exam results, see Encounter Report.    Optic nerve asymmetry, bilateral  -     Posterior Segment OCT Optic Nerve- WNL OU    Type 2 diabetes mellitus without complication, without long-term current use of insulin  Type 2 diabetes mellitus without ophthalmic manifestations  Essential hypertension  Good overall ocular health    Presbyopia   Rx specs    Allergic conjunctivitis of both eyes   Use Pataday XS QAM    RTC 1 year

## 2023-04-12 ENCOUNTER — PATIENT MESSAGE (OUTPATIENT)
Dept: ADMINISTRATIVE | Facility: HOSPITAL | Age: 53
End: 2023-04-12
Payer: MEDICAID

## 2023-04-13 ENCOUNTER — PATIENT OUTREACH (OUTPATIENT)
Dept: ADMINISTRATIVE | Facility: OTHER | Age: 53
End: 2023-04-13
Payer: MEDICAID

## 2023-04-13 ENCOUNTER — OFFICE VISIT (OUTPATIENT)
Dept: PRIMARY CARE CLINIC | Facility: CLINIC | Age: 53
End: 2023-04-13
Payer: MEDICAID

## 2023-04-13 VITALS
HEART RATE: 99 BPM | HEIGHT: 71 IN | OXYGEN SATURATION: 98 % | SYSTOLIC BLOOD PRESSURE: 122 MMHG | WEIGHT: 280.31 LBS | DIASTOLIC BLOOD PRESSURE: 93 MMHG | BODY MASS INDEX: 39.24 KG/M2

## 2023-04-13 DIAGNOSIS — K59.00 CONSTIPATION, UNSPECIFIED CONSTIPATION TYPE: ICD-10-CM

## 2023-04-13 DIAGNOSIS — M54.41 CHRONIC RIGHT-SIDED LOW BACK PAIN WITH RIGHT-SIDED SCIATICA: ICD-10-CM

## 2023-04-13 DIAGNOSIS — E66.01 CLASS 3 SEVERE OBESITY WITH BODY MASS INDEX (BMI) OF 40.0 TO 44.9 IN ADULT, UNSPECIFIED OBESITY TYPE, UNSPECIFIED WHETHER SERIOUS COMORBIDITY PRESENT: ICD-10-CM

## 2023-04-13 DIAGNOSIS — R07.89 ATYPICAL CHEST PAIN: ICD-10-CM

## 2023-04-13 DIAGNOSIS — G89.29 CHRONIC RIGHT-SIDED LOW BACK PAIN WITH RIGHT-SIDED SCIATICA: ICD-10-CM

## 2023-04-13 DIAGNOSIS — N52.01 ERECTILE DYSFUNCTION DUE TO ARTERIAL INSUFFICIENCY: ICD-10-CM

## 2023-04-13 DIAGNOSIS — G62.9 POLYNEUROPATHY: Primary | ICD-10-CM

## 2023-04-13 DIAGNOSIS — I10 ESSENTIAL HYPERTENSION: ICD-10-CM

## 2023-04-13 DIAGNOSIS — E66.9 CLASS 2 OBESITY WITH BODY MASS INDEX (BMI) OF 39.0 TO 39.9 IN ADULT, UNSPECIFIED OBESITY TYPE, UNSPECIFIED WHETHER SERIOUS COMORBIDITY PRESENT: ICD-10-CM

## 2023-04-13 PROCEDURE — 3074F SYST BP LT 130 MM HG: CPT | Mod: CPTII,,, | Performed by: FAMILY MEDICINE

## 2023-04-13 PROCEDURE — 3044F PR MOST RECENT HEMOGLOBIN A1C LEVEL <7.0%: ICD-10-PCS | Mod: CPTII,,, | Performed by: FAMILY MEDICINE

## 2023-04-13 PROCEDURE — 4010F PR ACE/ARB THEARPY RXD/TAKEN: ICD-10-PCS | Mod: CPTII,,, | Performed by: FAMILY MEDICINE

## 2023-04-13 PROCEDURE — 1159F MED LIST DOCD IN RCRD: CPT | Mod: CPTII,,, | Performed by: FAMILY MEDICINE

## 2023-04-13 PROCEDURE — 3080F DIAST BP >= 90 MM HG: CPT | Mod: CPTII,,, | Performed by: FAMILY MEDICINE

## 2023-04-13 PROCEDURE — 3008F BODY MASS INDEX DOCD: CPT | Mod: CPTII,,, | Performed by: FAMILY MEDICINE

## 2023-04-13 PROCEDURE — 3080F PR MOST RECENT DIASTOLIC BLOOD PRESSURE >= 90 MM HG: ICD-10-PCS | Mod: CPTII,,, | Performed by: FAMILY MEDICINE

## 2023-04-13 PROCEDURE — 1159F PR MEDICATION LIST DOCUMENTED IN MEDICAL RECORD: ICD-10-PCS | Mod: CPTII,,, | Performed by: FAMILY MEDICINE

## 2023-04-13 PROCEDURE — 99999 PR PBB SHADOW E&M-EST. PATIENT-LVL V: CPT | Mod: PBBFAC,,, | Performed by: FAMILY MEDICINE

## 2023-04-13 PROCEDURE — 1160F RVW MEDS BY RX/DR IN RCRD: CPT | Mod: CPTII,,, | Performed by: FAMILY MEDICINE

## 2023-04-13 PROCEDURE — 1160F PR REVIEW ALL MEDS BY PRESCRIBER/CLIN PHARMACIST DOCUMENTED: ICD-10-PCS | Mod: CPTII,,, | Performed by: FAMILY MEDICINE

## 2023-04-13 PROCEDURE — 99215 PR OFFICE/OUTPT VISIT, EST, LEVL V, 40-54 MIN: ICD-10-PCS | Mod: S$PBB,,, | Performed by: FAMILY MEDICINE

## 2023-04-13 PROCEDURE — 3008F PR BODY MASS INDEX (BMI) DOCUMENTED: ICD-10-PCS | Mod: CPTII,,, | Performed by: FAMILY MEDICINE

## 2023-04-13 PROCEDURE — 3074F PR MOST RECENT SYSTOLIC BLOOD PRESSURE < 130 MM HG: ICD-10-PCS | Mod: CPTII,,, | Performed by: FAMILY MEDICINE

## 2023-04-13 PROCEDURE — 4010F ACE/ARB THERAPY RXD/TAKEN: CPT | Mod: CPTII,,, | Performed by: FAMILY MEDICINE

## 2023-04-13 PROCEDURE — 99999 PR PBB SHADOW E&M-EST. PATIENT-LVL V: ICD-10-PCS | Mod: PBBFAC,,, | Performed by: FAMILY MEDICINE

## 2023-04-13 PROCEDURE — 3044F HG A1C LEVEL LT 7.0%: CPT | Mod: CPTII,,, | Performed by: FAMILY MEDICINE

## 2023-04-13 PROCEDURE — 99215 OFFICE O/P EST HI 40 MIN: CPT | Mod: S$PBB,,, | Performed by: FAMILY MEDICINE

## 2023-04-13 PROCEDURE — 99215 OFFICE O/P EST HI 40 MIN: CPT | Mod: PBBFAC,PN | Performed by: FAMILY MEDICINE

## 2023-04-13 RX ORDER — PREGABALIN 75 MG/1
75 CAPSULE ORAL 2 TIMES DAILY
Qty: 60 CAPSULE | Refills: 1 | Status: SHIPPED | OUTPATIENT
Start: 2023-04-13 | End: 2023-07-05 | Stop reason: SDUPTHER

## 2023-04-13 RX ORDER — CYCLOBENZAPRINE HCL 10 MG
10 TABLET ORAL 3 TIMES DAILY
Qty: 90 TABLET | Refills: 6 | Status: SHIPPED | OUTPATIENT
Start: 2023-04-13 | End: 2023-12-27 | Stop reason: SDUPTHER

## 2023-04-13 NOTE — PROGRESS NOTES
CHW - Initial Contact    This Community Health Worker updated the Social Determinant of Health questionnaire with patient via telephone today.    Pt identified barriers of most importance are: patient stated that he needs assistance with housing  Referrals to community agencies completed with patient/caregiver consent outside of [facility] include: no  Referrals were put through [facility] - yes: chw put in a referral in through [facility] premium support and no block re-entry  Support and Services: yes patient is coming in office on today 04/13/2023 to discuss more with chw about his needs  Other information discussed the patient needs / wants help with: no   Follow up required:   Follow-up Outreach - Due: 5/10/2023

## 2023-04-13 NOTE — PROGRESS NOTES
Clinic Note  4/13/2023      Subjective:       Patient ID:  Haseeb is a 52 y.o. male being seen for an established visit.    Chief Complaint:  Follow-up    Hip pain and shoulder pain-patient went to the emergency room last week for worsening of his right hip pain, was given Norco with some improvement of symptoms.      Right shoulder pain and weakness-several days ago had difficulty lifting up his shoulder due to pain, has been improving    Polyneuropathy-patient with chronic polyneuropathy, specifically severe of his bilateral upper extremity with numbness and tingling.  Had evaluation by Neurosurgery in not not feel that this was spinal etiology.  EMG of both upper and lower extremity shows severe sensory polyneuropathy in bilateral upper extremity as well as right lower extremity.  Currently on Flexeril which helps some, duloxetine, amitriptyline, gabapentin.  Patient feels like he gets brain fog on the gabapentin.  Has been told by multiple different providers to go to pain management, however patient has been unable to see one.  Found to have vitamin B12 deficiency and has been repleted    Constipation/diarrhea-currently on Linzess 140 mcg daily.  Has lower pelvic pain/discomfort Patient has to strain but still having loose stools.  Has an appointment with GI next week    ED-was seen by Urology and prescribed Cialis which patient has not taken due to cost    Hypertension-taking all of his blood pressure medications    Intermittent chest pain-patient reports having intermittent chest pain that occurs once every several weeks, usually occurs with sitting or drinking water.  Symptoms usually improve when patient takes deep breaths or massage the area.  No chest pain with exertion        Review of Systems   Constitutional:  Negative for chills, fever, malaise/fatigue and weight loss.   HENT:  Negative for congestion, sinus pain and sore throat.    Respiratory:  Negative for cough, shortness of breath and  wheezing.    Cardiovascular:  Negative for chest pain and palpitations.   Gastrointestinal:  Negative for constipation, diarrhea, nausea and vomiting.   Genitourinary:  Negative for dysuria, frequency and urgency.   Musculoskeletal:  Positive for back pain. Negative for myalgias.   Skin:  Negative for rash.   Neurological:  Positive for sensory change. Negative for headaches.     Medication List with Changes/Refills   New Medications    PREGABALIN (LYRICA) 75 MG CAPSULE    Take 1 capsule (75 mg total) by mouth 2 (two) times daily.   Current Medications    AMITRIPTYLINE (ELAVIL) 25 MG TABLET    TAKE 1 TABLET    ATORVASTATIN (LIPITOR) 40 MG TABLET    Take 1 tablet (40 mg total) by mouth every evening. High cholesterol    BLOOD-GLUCOSE METER [facility] [place]    Accu-Chek Guide Me Glucose Meter   CHECK BLOOD GLUCOSE TWICE DAILY FOR DIABETES    CARTIA  MG CP24    TAKE 1 CAPSULE BY MOUTH ONCE DAILY FOR HIGH BLOOD PRESSURE    CHOLECALCIFEROL, VITAMIN D3, 1,250 MCG (50,000 UNIT) CAPSULE    Take 50,000 Units by mouth once a week.    CLONIDINE 0.2 MG/24 HR TD PTWK (CATAPRES) 0.2 MG/24 HR    1 patch to skin    DILTIAZEM (DILACOR XR) 120 MG CDCR    TAKE 1 CAPSULE BY MOUTH ONCE DAILY FOR HIGH BLOOD PRESSURE    DULAGLUTIDE (TRULICITY) 3 MG/0.5 ML PEN INJECTOR    INJECT 3 MG INTO THE SKIN EVERY 7 DAYS    DULOXETINE (CYMBALTA) 60 MG CAPSULE    Take 1 capsule by mouth once daily    FAMOTIDINE (PEPCID) 40 MG TABLET    TAKE 1 TABLET BY MOUTH NIGHTLY AS NEEDED FOR HEARTBURN    FUROSEMIDE (LASIX) 40 MG TABLET    TAKE 1 TABLET BY MOUTH ONCE DAILY FOR  WATER  PILL.    HYDROCODONE-ACETAMINOPHEN (NORCO) 7.5-325 MG PER TABLET    Take 1 tablet by mouth every 6 (six) hours as needed for Pain.    JARDIANCE 10 MG TABLET    TAKE 1 TABLET BY MOUTH ONCE DAILY FOR DIABETES    LANCETS (ACCU-CHEK FASTCLIX LANCET DRUM [facility] [place])    Accu-Chek Fastclix Lancet Drum   USE TO CHECK GLUCOSE TWICE DAILY FOR DIABETES    LINACLOTIDE (LINZESS) 145 MCG CAP CAPSULE    TAKE 1  "CAPSULE BY MOUTH ONCE DAILY FOR CONSTIPATION    LISINOPRIL-HYDROCHLOROTHIAZIDE (PRINZIDE,ZESTORETIC) 20-25 MG TAB    TAKE 1 TABLET BY MOUTH ONCE DAILY FOR HIGH BLOOD PRESSURE    METFORMIN (GLUCOPHAGE) 1000 MG TABLET    TAKE 1 TABLET BY MOUTH TWICE DAILY WITH MEALS    OMEPRAZOLE (PRILOSEC) 40 MG CAPSULE    Take 1 capsule (40 mg total) by mouth once daily. Acid reflux    PEN NEEDLE, DIABETIC (BD ULTRA-FINE SHORT PEN NEEDLE) 31 GAUGE X 5/16" NDLE    USE 1 PEN NEEDLE TO INJECT INSULIN ONCE DAILY.    PEN NEEDLE, DIABETIC 31 GAUGE X 1/4" NDLE    pen needle, diabetic 31 gauge x 1/4"   USE 1 PEN NEEDLE WITH VICTOZA ONCE DAILY    PEN NEEDLE, DIABETIC 31 GAUGE X 5/16" NDLE    BD Ultra-Fine Short Pen Needle 31 gauge x 5/16"   USE 1 PEN NEEDLE TO INJECT INSULIN ONCE DAILY.    PEN NEEDLE, DIABETIC 31 GAUGE X 5/16" NDLE    BD Ultra-Fine Short Pen Needle 31 gauge x 5/16"   USE 1 PEN NEEDLE TO INJECT INSULIN ONCE DAILY   Changed and/or Refilled Medications    Modified Medication Previous Medication    CYCLOBENZAPRINE (FLEXERIL) 10 MG TABLET cyclobenzaprine (FLEXERIL) 10 MG tablet       Take 1 tablet (10 mg total) by mouth 3 (three) times daily.    Take 1 tablet (10 mg total) by mouth 3 (three) times daily.   Discontinued Medications    GABAPENTIN (NEURONTIN) 300 MG CAPSULE    Take 1 capsule (300 mg total) by mouth 3 (three) times daily.    POLYETHYLENE GLYCOL (GLYCOLAX) 17 GRAM/DOSE POWDER    polyethylene glycol 3350 17 gram/dose oral powder    POTASSIUM CHLORIDE (KLOR-CON) 8 MEQ TBSR    Take 1 tablet by mouth once daily.    PROMETHAZINE (PHENERGAN) 25 MG TABLET    TAKE 1 TABLET BY MOUTH THREE TIMES DAILY AS NEEDED FOR NAUSEA    SUCRALFATE (CARAFATE) 1 GRAM TABLET    Take 1 tablet (1 g total) by mouth 4 (four) times daily before meals and nightly. (lines the stomach)    TADALAFIL (CIALIS) 20 MG TAB    Take 1 tablet (20 mg total) by mouth Every 3 (three) days. for 10 days       Patient Active Problem List   Diagnosis    Chronic " "right-sided low back pain with right-sided sciatica    Type 2 diabetes mellitus without complication, without long-term current use of insulin    Class 3 severe obesity with body mass index (BMI) of 40.0 to 44.9 in adult    Constipation    Essential hypertension    Hyperlipidemia    Rectal mass    Osteoarthritis of spine with radiculopathy, lumbar region    Polyneuropathy    Decreased ROM of right shoulder    Decreased strength of upper extremity    Poor posture    Erectile dysfunction    Vitamin B12 deficiency           Objective:      BP (!) 122/93 (BP Location: Left arm, Patient Position: Sitting, BP Method: X-Large (Automatic))   Pulse 99   Ht 5' 11" (1.803 m)   Wt 127.2 kg (280 lb 5 oz)   SpO2 98%   BMI 39.10 kg/m²   Estimated body mass index is 39.1 kg/m² as calculated from the following:    Height as of this encounter: 5' 11" (1.803 m).    Weight as of this encounter: 127.2 kg (280 lb 5 oz).  Physical Exam  Vitals reviewed.   Constitutional:       General: He is not in acute distress.     Appearance: He is obese. He is not diaphoretic.   HENT:      Head: Normocephalic and atraumatic.   Eyes:      Conjunctiva/sclera: Conjunctivae normal.   Cardiovascular:      Rate and Rhythm: Normal rate and regular rhythm.      Heart sounds: Normal heart sounds.   Pulmonary:      Effort: Pulmonary effort is normal. No respiratory distress.      Breath sounds: Normal breath sounds. No wheezing.   Abdominal:      General: Bowel sounds are normal.      Palpations: Abdomen is soft.   Musculoskeletal:         General: Normal range of motion.      Cervical back: Normal range of motion.   Skin:     General: Skin is warm and dry.      Findings: No erythema or rash.   Neurological:      Mental Status: He is alert and oriented to person, place, and time.   Psychiatric:         Mood and Affect: Mood and affect normal.         Behavior: Behavior normal.         Thought Content: Thought content normal.         Judgment: Judgment " normal.         Assessment and Plan:     1. Polyneuropathy  -  continue Flexeril, Cymbalta, amitriptyline, will switch from gabapentin to Lyrica  - no clear etiology for why patient is having polyneuropathy of his upper extremities, lower extremities could be explained by neuroforaminal narrowing.  Vitamin B12 deficiency has been corrected  - pregabalin (LYRICA) 75 MG capsule; Take 1 capsule (75 mg total) by mouth 2 (two) times daily.  Dispense: 60 capsule; Refill: 1  - Ambulatory referral/consult to Pain Clinic; Future    2. Constipation, unspecified constipation type  - on Linzess 145 mcg daily, also with symptoms of diarrhea.  Patient possibly taking too high of a dose, also with history of rectal mass which was benign.  Follow-up with GI next week  - X-Ray Abdomen Flat And Erect; Future    3. Chronic right-sided low back pain with right-sided sciatica  -  continue Flexeril, Cymbalta, amitriptyline, will switch from gabapentin to Lyrica  - cyclobenzaprine (FLEXERIL) 10 MG tablet; Take 1 tablet (10 mg total) by mouth 3 (three) times daily.  Dispense: 90 tablet; Refill: 6  - Ambulatory referral/consult to Pain Clinic; Future    4. Class 2 obesity with body mass index (BMI) of 39.0 to 39.9 in adult, unspecified obesity type, unspecified whether serious comorbidity present  - Ambulatory referral/consult to Bariatric Surgery; Future    6. Erectile dysfunction due to arterial insufficiency  - Testosterone Panel; Future  - follow-up with Urology    7. Essential hypertension  - blood pressure slightly elevated today, patient compliant with medications.  Follow-up in 2 weeks for nursing blood pressure check    8. Atypical chest pain  - lower suspicion for cardiac etiology, suspicious for musculoskeletal versus GI      Follow Up:   Follow up in about 3 months (around 7/13/2023) for 2 week nursing BP check, 3 month f/u with Dr. Patino.    Other Orders Placed This Visit:  Orders Placed This Encounter   Procedures    X-Ray  Abdomen Flat And Erect    Testosterone Panel    Ambulatory referral/consult to Bariatric Surgery    Ambulatory referral/consult to Pain Clinic         Aman Patino MD        This note is dictated on M*Modal word recognition program.  There are word recognition mistakes that are occasionally missed on review.      I spent a total of 55 minutes on the day of the visit.  This includes face to face time and non-face to face time preparing to see the patient (eg, review of tests), obtaining and/or reviewing separately obtained history, documenting clinical information in the electronic or other health record, independently interpreting results and communicating results to the patient/family/caregiver, or care coordinator.

## 2023-04-17 ENCOUNTER — HOSPITAL ENCOUNTER (OUTPATIENT)
Dept: RADIOLOGY | Facility: HOSPITAL | Age: 53
Discharge: HOME | End: 2023-04-17
Attending: FAMILY MEDICINE
Payer: MEDICAID

## 2023-04-17 DIAGNOSIS — K59.00 CONSTIPATION, UNSPECIFIED CONSTIPATION TYPE: ICD-10-CM

## 2023-04-17 PROCEDURE — 74019 RADEX ABDOMEN 2 VIEWS: CPT | Mod: TC

## 2023-04-17 PROCEDURE — 74019 XR ABDOMEN FLAT AND ERECT: ICD-10-PCS | Mod: 26,,, | Performed by: RADIOLOGY

## 2023-04-17 PROCEDURE — 74019 RADEX ABDOMEN 2 VIEWS: CPT | Mod: 26,,, | Performed by: RADIOLOGY

## 2023-04-21 ENCOUNTER — OFFICE VISIT (OUTPATIENT)
Dept: GASTROENTEROLOGY | Facility: CLINIC | Age: 53
End: 2023-04-21
Payer: MEDICAID

## 2023-04-21 VITALS
SYSTOLIC BLOOD PRESSURE: 118 MMHG | WEIGHT: 277.63 LBS | DIASTOLIC BLOOD PRESSURE: 82 MMHG | HEIGHT: 71 IN | BODY MASS INDEX: 38.87 KG/M2

## 2023-04-21 DIAGNOSIS — R10.30 LOWER ABDOMINAL PAIN: ICD-10-CM

## 2023-04-21 DIAGNOSIS — K59.04 CHRONIC IDIOPATHIC CONSTIPATION: Primary | ICD-10-CM

## 2023-04-21 DIAGNOSIS — E66.01 CLASS 2 SEVERE OBESITY DUE TO EXCESS CALORIES WITH SERIOUS COMORBIDITY AND BODY MASS INDEX (BMI) OF 38.0 TO 38.9 IN ADULT: ICD-10-CM

## 2023-04-21 PROBLEM — E66.813 CLASS 3 SEVERE OBESITY WITH BODY MASS INDEX (BMI) OF 40.0 TO 44.9 IN ADULT: Status: RESOLVED | Noted: 2021-04-12 | Resolved: 2023-04-21

## 2023-04-21 PROBLEM — E66.812 CLASS 2 SEVERE OBESITY DUE TO EXCESS CALORIES WITH SERIOUS COMORBIDITY AND BODY MASS INDEX (BMI) OF 38.0 TO 38.9 IN ADULT: Status: ACTIVE | Noted: 2023-04-21

## 2023-04-21 PROCEDURE — 3044F HG A1C LEVEL LT 7.0%: CPT | Mod: CPTII,,, | Performed by: NURSE PRACTITIONER

## 2023-04-21 PROCEDURE — 3074F SYST BP LT 130 MM HG: CPT | Mod: CPTII,,, | Performed by: NURSE PRACTITIONER

## 2023-04-21 PROCEDURE — 4010F ACE/ARB THERAPY RXD/TAKEN: CPT | Mod: CPTII,,, | Performed by: NURSE PRACTITIONER

## 2023-04-21 PROCEDURE — 1160F PR REVIEW ALL MEDS BY PRESCRIBER/CLIN PHARMACIST DOCUMENTED: ICD-10-PCS | Mod: CPTII,,, | Performed by: NURSE PRACTITIONER

## 2023-04-21 PROCEDURE — 99215 OFFICE O/P EST HI 40 MIN: CPT | Mod: PBBFAC,PO | Performed by: NURSE PRACTITIONER

## 2023-04-21 PROCEDURE — 99214 OFFICE O/P EST MOD 30 MIN: CPT | Mod: S$PBB,,, | Performed by: NURSE PRACTITIONER

## 2023-04-21 PROCEDURE — 3008F BODY MASS INDEX DOCD: CPT | Mod: CPTII,,, | Performed by: NURSE PRACTITIONER

## 2023-04-21 PROCEDURE — 3079F PR MOST RECENT DIASTOLIC BLOOD PRESSURE 80-89 MM HG: ICD-10-PCS | Mod: CPTII,,, | Performed by: NURSE PRACTITIONER

## 2023-04-21 PROCEDURE — 1160F RVW MEDS BY RX/DR IN RCRD: CPT | Mod: CPTII,,, | Performed by: NURSE PRACTITIONER

## 2023-04-21 PROCEDURE — 4010F PR ACE/ARB THEARPY RXD/TAKEN: ICD-10-PCS | Mod: CPTII,,, | Performed by: NURSE PRACTITIONER

## 2023-04-21 PROCEDURE — 99999 PR PBB SHADOW E&M-EST. PATIENT-LVL V: CPT | Mod: PBBFAC,,, | Performed by: NURSE PRACTITIONER

## 2023-04-21 PROCEDURE — 99999 PR PBB SHADOW E&M-EST. PATIENT-LVL V: ICD-10-PCS | Mod: PBBFAC,,, | Performed by: NURSE PRACTITIONER

## 2023-04-21 PROCEDURE — 3044F PR MOST RECENT HEMOGLOBIN A1C LEVEL <7.0%: ICD-10-PCS | Mod: CPTII,,, | Performed by: NURSE PRACTITIONER

## 2023-04-21 PROCEDURE — 99214 PR OFFICE/OUTPT VISIT, EST, LEVL IV, 30-39 MIN: ICD-10-PCS | Mod: S$PBB,,, | Performed by: NURSE PRACTITIONER

## 2023-04-21 PROCEDURE — 1159F PR MEDICATION LIST DOCUMENTED IN MEDICAL RECORD: ICD-10-PCS | Mod: CPTII,,, | Performed by: NURSE PRACTITIONER

## 2023-04-21 PROCEDURE — 3074F PR MOST RECENT SYSTOLIC BLOOD PRESSURE < 130 MM HG: ICD-10-PCS | Mod: CPTII,,, | Performed by: NURSE PRACTITIONER

## 2023-04-21 PROCEDURE — 3008F PR BODY MASS INDEX (BMI) DOCUMENTED: ICD-10-PCS | Mod: CPTII,,, | Performed by: NURSE PRACTITIONER

## 2023-04-21 PROCEDURE — 3079F DIAST BP 80-89 MM HG: CPT | Mod: CPTII,,, | Performed by: NURSE PRACTITIONER

## 2023-04-21 PROCEDURE — 1159F MED LIST DOCD IN RCRD: CPT | Mod: CPTII,,, | Performed by: NURSE PRACTITIONER

## 2023-04-21 RX ORDER — DICYCLOMINE HYDROCHLORIDE 20 MG/1
20 TABLET ORAL 3 TIMES DAILY PRN
Qty: 60 TABLET | Refills: 2 | Status: SHIPPED | OUTPATIENT
Start: 2023-04-21 | End: 2023-06-22 | Stop reason: SDUPTHER

## 2023-04-21 NOTE — PROGRESS NOTES
Subjective:       Patient ID: Haseeb Reilly is a 52 y.o. male.    Chief Complaint: Abdominal Pain (Lower), Diarrhea, and Constipation    53 y/o male referred by PCP for abdominal pain and constipation. Currently taking Linzess 145 mcg daily and has 3-4 loose BMs daily.    Endoscopy History  - 8/2022 colonoscopy (Heather Rojas MD) normal; repeat 8/2032.  - 4/2021 colonoscopy showed diverticulosis throughout colon, rectal polyp (neuroendocrine tumor), and hemorrhoids.  - 3/2021 EGD 3/2021 that showed a hiatal herrnia; biopsy (-) HP and (-) Celiac.      Abdominal Pain  This is a recurrent problem. The current episode started more than 1 year ago. The problem occurs intermittently. Pain location: lower abdomen. The quality of the pain is aching. The abdominal pain does not radiate. Associated symptoms include constipation and diarrhea. Pertinent negatives include no dysuria, fever, hematochezia, melena, nausea or vomiting. Nothing aggravates the pain. The pain is relieved by Nothing. He has tried nothing for the symptoms.     Past Medical History:   Diagnosis Date    Diabetes mellitus     Hypertension     Mixed hyperlipidemia        History reviewed. No pertinent surgical history.    History reviewed. No pertinent family history.    Social History     Socioeconomic History    Marital status:    Tobacco Use    Smoking status: Never    Smokeless tobacco: Never   Substance and Sexual Activity    Alcohol use: No    Drug use: No     Social Determinants of Health     Financial Resource Strain: High Risk    Difficulty of Paying Living Expenses: Very hard   Food Insecurity: No Food Insecurity    Worried About Running Out of Food in the Last Year: Never true    Ran Out of Food in the Last Year: Never true   Transportation Needs: No Transportation Needs    Lack of Transportation (Medical): No    Lack of Transportation (Non-Medical): No   Physical Activity: Inactive    Days of Exercise per Week: 0 days     "Minutes of Exercise per Session: 0 min   Stress: Stress Concern Present    Feeling of Stress : Rather much   Social Connections: Socially Isolated    Frequency of Communication with Friends and Family: More than three times a week    Frequency of Social Gatherings with Friends and Family: More than three times a week    Attends Spiritism Services: Never    Active Member of Clubs or Organizations: No    Attends Club or Organization Meetings: Never    Marital Status:    Housing Stability: High Risk    Unable to Pay for Housing in the Last Year: Yes    Number of Places Lived in the Last Year: 3    Unstable Housing in the Last Year: Yes       Review of Systems   Constitutional:  Negative for fever.   Respiratory:  Negative for shortness of breath.    Cardiovascular:  Negative for chest pain.   Gastrointestinal:  Positive for abdominal pain, constipation and diarrhea. Negative for hematochezia, melena, nausea and vomiting.   Genitourinary:  Negative for dysuria.   Psychiatric/Behavioral:  Negative for dysphoric mood.        Objective:     Vitals:    04/21/23 1458   BP: 118/82   BP Location: Right arm   Patient Position: Sitting   BP Method: Large (Manual)   Weight: 125.9 kg (277 lb 9.6 oz)   Height: 5' 11" (1.803 m)          Physical Exam  Constitutional:       General: He is not in acute distress.     Appearance: He is obese.   HENT:      Head: Normocephalic.   Eyes:      Conjunctiva/sclera: Conjunctivae normal.      Pupils: Pupils are equal, round, and reactive to light.   Pulmonary:      Effort: Pulmonary effort is normal. No respiratory distress.   Musculoskeletal:         General: Normal range of motion.      Cervical back: Normal range of motion.   Skin:     General: Skin is warm and dry.   Neurological:      Mental Status: He is alert and oriented to person, place, and time.   Psychiatric:         Mood and Affect: Mood normal.         Behavior: Behavior normal.             Assessment:         ICD-10-CM " ICD-9-CM   1. Chronic idiopathic constipation  K59.04 564.00   2. Lower abdominal pain  R10.30 789.09   3. Class 2 severe obesity due to excess calories with serious comorbidity and body mass index (BMI) of 38.0 to 38.9 in adult  E66.01 278.01    Z68.38 V85.38       Plan:       Chronic idiopathic constipation  -     Ambulatory referral/consult to Gastroenterology  -     linaCLOtide (LINZESS) 72 mcg Cap capsule; Take 1 capsule (72 mcg total) by mouth once daily.  Dispense: 30 capsule; Refill: 2    Lower abdominal pain  -     dicyclomine (BENTYL) 20 mg tablet; Take 1 tablet (20 mg total) by mouth 3 (three) times daily as needed (abdominal pain).  Dispense: 60 tablet; Refill: 2    Class 2 severe obesity due to excess calories with serious comorbidity and body mass index (BMI) of 38.0 to 38.9 in adult    - Decrease dose of Linzess from 145 to 72 mcg daily. Add dicyclomine prn abdominal pain. Weight loss advised. Dietary and exercise counseling done.     Follow up in about 6 weeks (around 6/2/2023) for medication management, If symptoms worsen or fail to improve.     Patient's Medications   New Prescriptions    DICYCLOMINE (BENTYL) 20 MG TABLET    Take 1 tablet (20 mg total) by mouth 3 (three) times daily as needed (abdominal pain).    LINACLOTIDE (LINZESS) 72 MCG CAP CAPSULE    Take 1 capsule (72 mcg total) by mouth once daily.   Previous Medications    AMITRIPTYLINE (ELAVIL) 25 MG TABLET    TAKE 1 TABLET    ATORVASTATIN (LIPITOR) 40 MG TABLET    Take 1 tablet (40 mg total) by mouth every evening. High cholesterol    BLOOD-GLUCOSE METER [facility] [place]    Accu-Chek Guide Me Glucose Meter   CHECK BLOOD GLUCOSE TWICE DAILY FOR DIABETES    CARTIA  MG CP24    TAKE 1 CAPSULE BY MOUTH ONCE DAILY FOR HIGH BLOOD PRESSURE    CHOLECALCIFEROL, VITAMIN D3, 1,250 MCG (50,000 UNIT) CAPSULE    Take 50,000 Units by mouth once a week.    CLONIDINE 0.2 MG/24 HR TD PTWK (CATAPRES) 0.2 MG/24 HR    1 patch to skin    CYCLOBENZAPRINE (FLEXERIL)  "10 MG TABLET    Take 1 tablet (10 mg total) by mouth 3 (three) times daily.    DILTIAZEM (DILACOR XR) 120 MG CDCR    TAKE 1 CAPSULE BY MOUTH ONCE DAILY FOR HIGH BLOOD PRESSURE    DULAGLUTIDE (TRULICITY) 3 MG/0.5 ML PEN INJECTOR    INJECT 3 MG INTO THE SKIN EVERY 7 DAYS    DULOXETINE (CYMBALTA) 60 MG CAPSULE    Take 1 capsule by mouth once daily    FAMOTIDINE (PEPCID) 40 MG TABLET    TAKE 1 TABLET BY MOUTH NIGHTLY AS NEEDED FOR HEARTBURN    FUROSEMIDE (LASIX) 40 MG TABLET    TAKE 1 TABLET BY MOUTH ONCE DAILY FOR  WATER  PILL.    HYDROCODONE-ACETAMINOPHEN (NORCO) 7.5-325 MG PER TABLET    Take 1 tablet by mouth every 6 (six) hours as needed for Pain.    JARDIANCE 10 MG TABLET    TAKE 1 TABLET BY MOUTH ONCE DAILY FOR DIABETES    LANCETS (ACCU-CHEK FASTCLIX LANCET DRUM [facility])    Accu-Chek Fastclix Lancet Drum   USE TO CHECK GLUCOSE TWICE DAILY FOR DIABETES    LISINOPRIL-HYDROCHLOROTHIAZIDE (PRINZIDE,ZESTORETIC) 20-25 MG TAB    TAKE 1 TABLET BY MOUTH ONCE DAILY FOR HIGH BLOOD PRESSURE    METFORMIN (GLUCOPHAGE) 1000 MG TABLET    TAKE 1 TABLET BY MOUTH TWICE DAILY WITH MEALS    OMEPRAZOLE (PRILOSEC) 40 MG CAPSULE    Take 1 capsule (40 mg total) by mouth once daily. Acid reflux    PEN NEEDLE, DIABETIC (BD ULTRA-FINE SHORT PEN NEEDLE) 31 GAUGE X 5/16" NDLE    USE 1 PEN NEEDLE TO INJECT INSULIN ONCE DAILY.    PEN NEEDLE, DIABETIC 31 GAUGE X 1/4" NDLE    pen needle, diabetic 31 gauge x 1/4"   USE 1 PEN NEEDLE WITH VICTOZA ONCE DAILY    PEN NEEDLE, DIABETIC 31 GAUGE X 5/16" NDLE    BD Ultra-Fine Short Pen Needle 31 gauge x 5/16"   USE 1 PEN NEEDLE TO INJECT INSULIN ONCE DAILY.    PEN NEEDLE, DIABETIC 31 GAUGE X 5/16" NDLE    BD Ultra-Fine Short Pen Needle 31 gauge x 5/16"   USE 1 PEN NEEDLE TO INJECT INSULIN ONCE DAILY    PREGABALIN (LYRICA) 75 MG CAPSULE    Take 1 capsule (75 mg total) by mouth 2 (two) times daily.   Modified Medications    No medications on file   Discontinued Medications    LINACLOTIDE (LINZESS) 145 MCG CAP " CAPSULE    TAKE 1 CAPSULE BY MOUTH ONCE DAILY FOR CONSTIPATION

## 2023-04-27 ENCOUNTER — CLINICAL SUPPORT (OUTPATIENT)
Dept: PRIMARY CARE CLINIC | Facility: CLINIC | Age: 53
End: 2023-04-27
Payer: MEDICAID

## 2023-04-27 VITALS — OXYGEN SATURATION: 97 % | DIASTOLIC BLOOD PRESSURE: 80 MMHG | SYSTOLIC BLOOD PRESSURE: 126 MMHG | HEART RATE: 78 BPM

## 2023-04-27 DIAGNOSIS — I10 ESSENTIAL HYPERTENSION: Primary | ICD-10-CM

## 2023-04-27 PROCEDURE — 99999 PR PBB SHADOW E&M-EST. PATIENT-LVL I: ICD-10-PCS | Mod: PBBFAC,,,

## 2023-04-27 PROCEDURE — 99999 PR PBB SHADOW E&M-EST. PATIENT-LVL I: CPT | Mod: PBBFAC,,,

## 2023-04-27 PROCEDURE — 99211 OFF/OP EST MAY X REQ PHY/QHP: CPT | Mod: PBBFAC,PN

## 2023-04-27 NOTE — PROGRESS NOTES
Haseeb Reilly 52 y.o. male is here today for Blood Pressure check.   History of HTN no.    Review of patient's allergies indicates:   Allergen Reactions    Morphine      Creatinine   Date Value Ref Range Status   02/08/2023 1.3 0.5 - 1.4 mg/dL Final     Sodium   Date Value Ref Range Status   02/08/2023 140 136 - 145 mmol/L Final     Potassium   Date Value Ref Range Status   02/08/2023 4.4 3.5 - 5.1 mmol/L Final   ]  Patient verifies taking blood pressure medications on a regular basis at the same time of the day.     Current Outpatient Medications:     amitriptyline (ELAVIL) 25 MG tablet, TAKE 1 TABLET, Disp: , Rfl:     atorvastatin (LIPITOR) 40 MG tablet, Take 1 tablet (40 mg total) by mouth every evening. High cholesterol, Disp: 90 tablet, Rfl: 3    blood-glucose meter Misc, Accu-Chek Guide Me Glucose Meter  CHECK BLOOD GLUCOSE TWICE DAILY FOR DIABETES, Disp: , Rfl:     CARTIA  mg Cp24, TAKE 1 CAPSULE BY MOUTH ONCE DAILY FOR HIGH BLOOD PRESSURE, Disp: , Rfl:     cholecalciferol, vitamin D3, 1,250 mcg (50,000 unit) capsule, Take 50,000 Units by mouth once a week., Disp: , Rfl:     cloNIDine 0.2 mg/24 hr td ptwk (CATAPRES) 0.2 mg/24 hr, 1 patch to skin, Disp: , Rfl:     cyclobenzaprine (FLEXERIL) 10 MG tablet, Take 1 tablet (10 mg total) by mouth 3 (three) times daily., Disp: 90 tablet, Rfl: 6    dicyclomine (BENTYL) 20 mg tablet, Take 1 tablet (20 mg total) by mouth 3 (three) times daily as needed (abdominal pain)., Disp: 60 tablet, Rfl: 2    diltiaZEM (DILACOR XR) 120 MG CDCR, TAKE 1 CAPSULE BY MOUTH ONCE DAILY FOR HIGH BLOOD PRESSURE, Disp: , Rfl:     dulaglutide (TRULICITY) 3 mg/0.5 mL pen injector, INJECT 3 MG INTO THE SKIN EVERY 7 DAYS, Disp: 4 pen, Rfl: 3    DULoxetine (CYMBALTA) 60 MG capsule, Take 1 capsule by mouth once daily, Disp: 90 capsule, Rfl: 0    famotidine (PEPCID) 40 MG tablet, TAKE 1 TABLET BY MOUTH NIGHTLY AS NEEDED FOR HEARTBURN, Disp: 90 tablet, Rfl: 3    furosemide (LASIX) 40 MG  "tablet, TAKE 1 TABLET BY MOUTH ONCE DAILY FOR  WATER  PILL., Disp: 90 tablet, Rfl: 3    HYDROcodone-acetaminophen (NORCO) 7.5-325 mg per tablet, Take 1 tablet by mouth every 6 (six) hours as needed for Pain., Disp: 12 tablet, Rfl: 0    JARDIANCE 10 mg tablet, TAKE 1 TABLET BY MOUTH ONCE DAILY FOR DIABETES, Disp: 90 tablet, Rfl: 0    lancets (ACCU-CHEK FASTCLIX LANCET DRUM MISC), Accu-Chek Fastclix Lancet Drum  USE TO CHECK GLUCOSE TWICE DAILY FOR DIABETES, Disp: , Rfl:     linaCLOtide (LINZESS) 72 mcg Cap capsule, Take 1 capsule (72 mcg total) by mouth once daily., Disp: 30 capsule, Rfl: 2    lisinopriL-hydrochlorothiazide (PRINZIDE,ZESTORETIC) 20-25 mg Tab, TAKE 1 TABLET BY MOUTH ONCE DAILY FOR HIGH BLOOD PRESSURE, Disp: 90 tablet, Rfl: 3    metFORMIN (GLUCOPHAGE) 1000 MG tablet, TAKE 1 TABLET BY MOUTH TWICE DAILY WITH MEALS, Disp: 180 tablet, Rfl: 3    omeprazole (PRILOSEC) 40 MG capsule, Take 1 capsule (40 mg total) by mouth once daily. Acid reflux, Disp: 90 capsule, Rfl: 1    pen needle, diabetic (BD ULTRA-FINE SHORT PEN NEEDLE) 31 gauge x 5/16" Ndle, USE 1 PEN NEEDLE TO INJECT INSULIN ONCE DAILY., Disp: 100 each, Rfl: 0    pen needle, diabetic 31 gauge x 1/4" Ndle, pen needle, diabetic 31 gauge x 1/4"  USE 1 PEN NEEDLE WITH VICTOZA ONCE DAILY, Disp: , Rfl:     pen needle, diabetic 31 gauge x 5/16" Ndle, BD Ultra-Fine Short Pen Needle 31 gauge x 5/16"  USE 1 PEN NEEDLE TO INJECT INSULIN ONCE DAILY., Disp: , Rfl:     pen needle, diabetic 31 gauge x 5/16" Ndle, BD Ultra-Fine Short Pen Needle 31 gauge x 5/16"  USE 1 PEN NEEDLE TO INJECT INSULIN ONCE DAILY, Disp: , Rfl:     pregabalin (LYRICA) 75 MG capsule, Take 1 capsule (75 mg total) by mouth 2 (two) times daily., Disp: 60 capsule, Rfl: 1  Does patient have record of home blood pressure readings?. No  Last dose of blood pressure medication was taken at 7:00 this am.  Patient is asymptomatic.   Complains of none.    Vitals:    04/27/23 0927   BP: 126/80   BP " Location: Right arm   Patient Position: Sitting   BP Method: Large (Manual)   Pulse: 78   SpO2: 97%         Dr. Patino informed of nurse visit.

## 2023-04-28 DIAGNOSIS — K21.9 GASTRIC REFLUX: ICD-10-CM

## 2023-05-09 ENCOUNTER — PATIENT OUTREACH (OUTPATIENT)
Dept: ADMINISTRATIVE | Facility: OTHER | Age: 53
End: 2023-05-09
Payer: MEDICAID

## 2023-05-10 ENCOUNTER — PATIENT OUTREACH (OUTPATIENT)
Dept: ADMINISTRATIVE | Facility: OTHER | Age: 53
End: 2023-05-10
Payer: MEDICAID

## 2023-05-10 NOTE — PROGRESS NOTES
CHW - Follow Up    This Community Health Worker completed a follow up visit with patient via telephone today.  Pt/Caregiver reported: patient stated that he in need of housing bad   Community Health Worker provided: chw spoke with patient and help patient fill out application for housing  Follow up required:   Follow-up Outreach - Due: 6/8/2023

## 2023-05-17 ENCOUNTER — PATIENT OUTREACH (OUTPATIENT)
Dept: ADMINISTRATIVE | Facility: OTHER | Age: 53
End: 2023-05-17
Payer: MEDICAID

## 2023-05-17 NOTE — PROGRESS NOTES
CHW - Follow Up    This Community Health Worker completed a follow up visit with patient during clinic visit today.  Pt/Caregiver reported: patient stated that he is about to be put out his family  member home at the end of the month   Community Health Worker provided: chw is looking for assistance and apartment for patient in his price range  Follow up required:   Follow-up Outreach - Due: 6/8/2023

## 2023-05-23 RX ORDER — OMEPRAZOLE 40 MG/1
CAPSULE, DELAYED RELEASE ORAL
Qty: 90 CAPSULE | Refills: 3 | Status: SHIPPED | OUTPATIENT
Start: 2023-05-23

## 2023-05-23 NOTE — TELEPHONE ENCOUNTER
Care Due:                  Date            Visit Type   Department     Provider  --------------------------------------------------------------------------------                                HOSPITAL     [facility]  Last Visit: 04-      FOLLOW UP    CARE           TIGIST HICKMAN                               -                              PRIMARY      [facility]  Next Visit: 07-      CARE ([facility])   CARE           TIGIST HICKMAN                                                            Last  Test          Frequency    Reason                     Performed    Due Date  --------------------------------------------------------------------------------    HBA1C.......  6 months...  JARDIANCE, duladalid,    02- 08-                             metFORMIN................    [employer] Embedded Care Due Messages. Reference number: [id number].   5/23/2023 3:36:20 PM CDT

## 2023-05-30 ENCOUNTER — PATIENT OUTREACH (OUTPATIENT)
Dept: ADMINISTRATIVE | Facility: OTHER | Age: 53
End: 2023-05-30
Payer: MEDICAID

## 2023-05-30 NOTE — PROGRESS NOTES
CHW - Follow Up    This Community Health Worker completed a follow up visit with patient via telephone today.  Pt/Caregiver reported: patient stated that he still is in need of a place to stay and that he have land that he can put a trailer moblie home on if he cant get a padmini   Community Health Worker provided: chw is looking into padmini for patient   Follow up required:   Follow-up Outreach - Due: 6/1/2023

## 2023-05-31 ENCOUNTER — PATIENT OUTREACH (OUTPATIENT)
Dept: ADMINISTRATIVE | Facility: OTHER | Age: 53
End: 2023-05-31
Payer: MEDICAID

## 2023-05-31 NOTE — PROGRESS NOTES
CHW - Follow Up    This Community Health Worker completed a follow up visit with patient via telephone today.  Pt/Caregiver reported: patient is looking for a trailer to put on lot of property  Community Health Worker provided: chw called over 20 places for patient and gave him over 10 places to call patient turned down a few and now is looking for chw to help find a trailer  Follow up required:   Follow-up Outreach - Due: 6/6/2023

## 2023-06-07 ENCOUNTER — PATIENT OUTREACH (OUTPATIENT)
Dept: ADMINISTRATIVE | Facility: OTHER | Age: 53
End: 2023-06-07
Payer: MEDICAID

## 2023-06-07 DIAGNOSIS — E78.5 HYPERLIPIDEMIA, UNSPECIFIED HYPERLIPIDEMIA TYPE: ICD-10-CM

## 2023-06-07 RX ORDER — ATORVASTATIN CALCIUM 40 MG/1
TABLET, FILM COATED ORAL
Qty: 90 TABLET | Refills: 2 | Status: SHIPPED | OUTPATIENT
Start: 2023-06-07

## 2023-06-07 NOTE — TELEPHONE ENCOUNTER
Refill Decision Note   Haseeb Tommie  is requesting a refill authorization.  Brief Assessment and Rationale for Refill:  Approve     Medication Therapy Plan:       Medication Reconciliation Completed: No   Comments:     No Care Gaps recommended.     Note composed:9:27 AM 06/07/2023

## 2023-06-07 NOTE — TELEPHONE ENCOUNTER
No care due was identified.  [facility] Embedded Care Due Messages. Reference number: [id number].   6/07/2023 8:26:05 AM CDT

## 2023-06-07 NOTE — PROGRESS NOTES
CHW - Follow Up    This Community Health Worker completed a follow up visit with patient via telephone today.  Pt/Caregiver reported: patient still needs housing   Community Health Worker provided: chw still helping patient look for housing   Follow up required:   Follow-up Outreach - Due: 6/28/2023

## 2023-06-22 ENCOUNTER — OFFICE VISIT (OUTPATIENT)
Dept: GASTROENTEROLOGY | Facility: CLINIC | Age: 53
End: 2023-06-22
Payer: MEDICAID

## 2023-06-22 DIAGNOSIS — E66.01 CLASS 2 SEVERE OBESITY DUE TO EXCESS CALORIES WITH SERIOUS COMORBIDITY AND BODY MASS INDEX (BMI) OF 38.0 TO 38.9 IN ADULT: ICD-10-CM

## 2023-06-22 DIAGNOSIS — K59.04 CHRONIC IDIOPATHIC CONSTIPATION: ICD-10-CM

## 2023-06-22 DIAGNOSIS — R10.30 LOWER ABDOMINAL PAIN: Primary | ICD-10-CM

## 2023-06-22 PROBLEM — K62.89 RECTAL MASS: Status: RESOLVED | Noted: 2021-05-12 | Resolved: 2023-06-22

## 2023-06-22 PROCEDURE — 1160F PR REVIEW ALL MEDS BY PRESCRIBER/CLIN PHARMACIST DOCUMENTED: ICD-10-PCS | Mod: CPTII,95,, | Performed by: NURSE PRACTITIONER

## 2023-06-22 PROCEDURE — 3044F PR MOST RECENT HEMOGLOBIN A1C LEVEL <7.0%: ICD-10-PCS | Mod: CPTII,95,, | Performed by: NURSE PRACTITIONER

## 2023-06-22 PROCEDURE — 4010F ACE/ARB THERAPY RXD/TAKEN: CPT | Mod: CPTII,95,, | Performed by: NURSE PRACTITIONER

## 2023-06-22 PROCEDURE — 99214 PR OFFICE/OUTPT VISIT, EST, LEVL IV, 30-39 MIN: ICD-10-PCS | Mod: 95,,, | Performed by: NURSE PRACTITIONER

## 2023-06-22 PROCEDURE — 3044F HG A1C LEVEL LT 7.0%: CPT | Mod: CPTII,95,, | Performed by: NURSE PRACTITIONER

## 2023-06-22 PROCEDURE — 99214 OFFICE O/P EST MOD 30 MIN: CPT | Mod: 95,,, | Performed by: NURSE PRACTITIONER

## 2023-06-22 PROCEDURE — 4010F PR ACE/ARB THEARPY RXD/TAKEN: ICD-10-PCS | Mod: CPTII,95,, | Performed by: NURSE PRACTITIONER

## 2023-06-22 PROCEDURE — 1159F PR MEDICATION LIST DOCUMENTED IN MEDICAL RECORD: ICD-10-PCS | Mod: CPTII,95,, | Performed by: NURSE PRACTITIONER

## 2023-06-22 PROCEDURE — 1160F RVW MEDS BY RX/DR IN RCRD: CPT | Mod: CPTII,95,, | Performed by: NURSE PRACTITIONER

## 2023-06-22 PROCEDURE — 1159F MED LIST DOCD IN RCRD: CPT | Mod: CPTII,95,, | Performed by: NURSE PRACTITIONER

## 2023-06-22 RX ORDER — DICYCLOMINE HYDROCHLORIDE 20 MG/1
20 TABLET ORAL 3 TIMES DAILY PRN
Qty: 60 TABLET | Refills: 2 | Status: SHIPPED | OUTPATIENT
Start: 2023-06-22

## 2023-06-22 NOTE — PROGRESS NOTES
Subjective:       Patient ID: Haseeb Reilly is a 52 y.o. male.    Chief Complaint: Follow-up    The patient location is: [place], [place]  The chief complaint leading to consultation is: abdominal pain    Visit type: audiovisual    Face to Face time with patient: 15 minutes  35 minutes of total time spent on the encounter, which includes face to face time and non-face to face time preparing to see the patient (eg, review of tests), Obtaining and/or reviewing separately obtained history, Documenting clinical information in the electronic or other health record, Independently interpreting results (not separately reported) and communicating results to the patient/family/caregiver, or Care coordination (not separately reported).         Each patient to whom he or she provides medical services by telemedicine is:  (1) informed of the relationship between the physician and patient and the respective role of any other health care provider with respect to management of the patient; and (2) notified that he or she may decline to receive medical services by telemedicine and may withdraw from such care at any time.    Notes:     51 y/o male with hypertension, type 2 diabetes mellitus, chronic back pain, chronic constipation, and GERD presents for virtual follow up with c/o abdominal pain, and nausea. Patient reports generalized abdominal cramping pain that is worse after eating. Abdominal pain often associated with nausea. No vomiting. No heartburn or reflux. Does report early satiety without weight loss. Has alternating constipation and diarrhea. He is prescribed Linzess daily but admits he does not take medication regularly.       Endoscopy History  - 8/2022 colonoscopy (Heather Rojas MD) normal; repeat 8/2032.  - 4/2021 colonoscopy showed diverticulosis throughout colon, rectal polyp (neuroendocrine tumor), and hemorrhoids.  - 3/2021 EGD 3/2021 that showed a hiatal herrnia; biopsy (-) HP and (-)  Celiac.    Past Medical History:   Diagnosis Date    Diabetes mellitus     Hypertension     Mixed hyperlipidemia        History reviewed. No pertinent surgical history.    History reviewed. No pertinent family history.    Social History     Socioeconomic History    Marital status:    Tobacco Use    Smoking status: Never    Smokeless tobacco: Never   Substance and Sexual Activity    Alcohol use: No    Drug use: No     Social Determinants of Health     Financial Resource Strain: High Risk    Difficulty of Paying Living Expenses: Very hard   Food Insecurity: No Food Insecurity    Worried About Running Out of Food in the Last Year: Never true    Ran Out of Food in the Last Year: Never true   Transportation Needs: No Transportation Needs    Lack of Transportation (Medical): No    Lack of Transportation (Non-Medical): No   Physical Activity: Sufficiently Active    Days of Exercise per Week: 7 days    Minutes of Exercise per Session: 30 min   Stress: Stress Concern Present    Feeling of Stress : Rather much   Social Connections: Socially Isolated    Frequency of Communication with Friends and Family: More than three times a week    Frequency of Social Gatherings with Friends and Family: More than three times a week    Attends Gnosticism Services: Never    Active Member of Clubs or Organizations: No    Attends Club or Organization Meetings: Never    Marital Status:    Housing Stability: High Risk    Unable to Pay for Housing in the Last Year: Yes    Number of Places Lived in the Last Year: 3    Unstable Housing in the Last Year: Yes       Review of Systems   Constitutional:  Negative for fever.   Respiratory:  Negative for shortness of breath.    Cardiovascular:  Negative for chest pain.   Gastrointestinal:  Positive for abdominal pain. Negative for constipation, diarrhea, nausea and vomiting.   Genitourinary:  Negative for dysuria.   Psychiatric/Behavioral:  Negative for dysphoric mood.        Objective:      There were no vitals filed for this visit.       Physical Exam  Constitutional:       General: He is not in acute distress.     Appearance: He is obese.   HENT:      Head: Normocephalic.   Eyes:      Conjunctiva/sclera: Conjunctivae normal.   Pulmonary:      Effort: Pulmonary effort is normal. No respiratory distress.   Skin:     Coloration: Skin is not jaundiced or pale.   Neurological:      Mental Status: He is alert and oriented to person, place, and time.   Psychiatric:         Mood and Affect: Mood normal.         Behavior: Behavior normal.             Assessment:         ICD-10-CM ICD-9-CM   1. Lower abdominal pain  R10.30 789.09   2. Chronic idiopathic constipation  K59.04 564.00   3. Class 2 severe obesity due to excess calories with serious comorbidity and body mass index (BMI) of 38.0 to 38.9 in adult  E66.01 278.01    Z68.38 V85.38       Plan:       Lower abdominal pain  -     dicyclomine (BENTYL) 20 mg tablet; Take 1 tablet (20 mg total) by mouth 3 (three) times daily as needed (abdominal pain).  Dispense: 60 tablet; Refill: 2    Chronic idiopathic constipation  -     linaCLOtide (LINZESS) 72 mcg Cap capsule; Take 1 capsule (72 mcg total) by mouth once daily.  Dispense: 30 capsule; Refill: 2    Class 2 severe obesity due to excess calories with serious comorbidity and body mass index (BMI) of 38.0 to 38.9 in adult    - Continue omeprazole daily. Weight loss advised. Dietary and exercise counseling done. Advised patient to: eat foods low in fat and fiber, eat five or six small, nutritious meals a day instead of two or three large meals, chew your food thoroughly, eat soft, well-cooked foods, avoid carbonated, or fizzy, beverages, and avoid alcohol. Dicyclomine TID prn abdominal pain. Linzess daily.    Follow up if symptoms worsen or fail to improve.     Patient's Medications   New Prescriptions    No medications on file   Previous Medications    AMITRIPTYLINE (ELAVIL) 25 MG TABLET    TAKE 1 TABLET     "ATORVASTATIN (LIPITOR) 40 MG TABLET    TAKE 1 TABLET BY MOUTH IN THE EVENING FOR HIGH CHOLESTEROL    BLOOD-GLUCOSE METER [facility]    Accu-Chek Guide Me Glucose Meter   CHECK BLOOD GLUCOSE TWICE DAILY FOR DIABETES    CARTIA  MG CP24    TAKE 1 CAPSULE BY MOUTH ONCE DAILY FOR HIGH BLOOD PRESSURE    CHOLECALCIFEROL, VITAMIN D3, 1,250 MCG (50,000 UNIT) CAPSULE    Take 50,000 Units by mouth once a week.    CLONIDINE 0.2 MG/24 HR TD PTWK (CATAPRES) 0.2 MG/24 HR    1 patch to skin    CYCLOBENZAPRINE (FLEXERIL) 10 MG TABLET    Take 1 tablet (10 mg total) by mouth 3 (three) times daily.    DILTIAZEM (DILACOR XR) 120 MG CDCR    TAKE 1 CAPSULE BY MOUTH ONCE DAILY FOR HIGH BLOOD PRESSURE    DULAGLUTIDE (TRULICITY) 3 MG/0.5 ML PEN INJECTOR    INJECT 3 MG INTO THE SKIN EVERY 7 DAYS    DULOXETINE (CYMBALTA) 60 MG CAPSULE    Take 1 capsule by mouth once daily    FAMOTIDINE (PEPCID) 40 MG TABLET    TAKE 1 TABLET BY MOUTH NIGHTLY AS NEEDED FOR HEARTBURN    FUROSEMIDE (LASIX) 40 MG TABLET    TAKE 1 TABLET BY MOUTH ONCE DAILY FOR  WATER  PILL.    HYDROCODONE-ACETAMINOPHEN (NORCO) 7.5-325 MG PER TABLET    Take 1 tablet by mouth every 6 (six) hours as needed for Pain.    JARDIANCE 10 MG TABLET    TAKE 1 TABLET BY MOUTH ONCE DAILY FOR DIABETES    LANCETS (ACCU-CHEK FASTCLIX LANCET DRUM [facility])    Accu-Chek Fastclix Lancet Drum   USE TO CHECK GLUCOSE TWICE DAILY FOR DIABETES    LISINOPRIL-HYDROCHLOROTHIAZIDE (PRINZIDE,ZESTORETIC) 20-25 MG TAB    TAKE 1 TABLET BY MOUTH ONCE DAILY FOR HIGH BLOOD PRESSURE    METFORMIN (GLUCOPHAGE) 1000 MG TABLET    TAKE 1 TABLET BY MOUTH TWICE DAILY WITH MEALS    OMEPRAZOLE (PRILOSEC) 40 MG CAPSULE    TAKE 1 CAPSULE BY MOUTH ONCE DAILY ACID  REFLUX    PEN NEEDLE, DIABETIC (BD ULTRA-FINE SHORT PEN NEEDLE) 31 GAUGE X 5/16" NDLE    USE 1 PEN NEEDLE TO INJECT INSULIN ONCE DAILY.    PEN NEEDLE, DIABETIC 31 GAUGE X 1/4" NDLE    pen needle, diabetic 31 gauge x 1/4"   USE 1 PEN NEEDLE WITH VICTOZA ONCE DAILY    PEN " "NEEDLE, DIABETIC 31 GAUGE X 5/16" NDLE    BD Ultra-Fine Short Pen Needle 31 gauge x 5/16"   USE 1 PEN NEEDLE TO INJECT INSULIN ONCE DAILY.    PEN NEEDLE, DIABETIC 31 GAUGE X 5/16" NDLE    BD Ultra-Fine Short Pen Needle 31 gauge x 5/16"   USE 1 PEN NEEDLE TO INJECT INSULIN ONCE DAILY    PREGABALIN (LYRICA) 75 MG CAPSULE    Take 1 capsule (75 mg total) by mouth 2 (two) times daily.   Modified Medications    Modified Medication Previous Medication    DICYCLOMINE (BENTYL) 20 MG TABLET dicyclomine (BENTYL) 20 mg tablet       Take 1 tablet (20 mg total) by mouth 3 (three) times daily as needed (abdominal pain).    Take 1 tablet (20 mg total) by mouth 3 (three) times daily as needed (abdominal pain).    LINACLOTIDE (LINZESS) 72 MCG CAP CAPSULE linaCLOtide (LINZESS) 72 mcg Cap capsule       Take 1 capsule (72 mcg total) by mouth once daily.    Take 1 capsule (72 mcg total) by mouth once daily.   Discontinued Medications    No medications on file             "

## 2023-06-23 ENCOUNTER — PATIENT OUTREACH (OUTPATIENT)
Dept: ADMINISTRATIVE | Facility: OTHER | Age: 53
End: 2023-06-23
Payer: MEDICAID

## 2023-06-23 DIAGNOSIS — E11.9 TYPE 2 DIABETES MELLITUS WITHOUT COMPLICATION, WITHOUT LONG-TERM CURRENT USE OF INSULIN: ICD-10-CM

## 2023-06-23 RX ORDER — DULAGLUTIDE 3 MG/.5ML
3 INJECTION, SOLUTION SUBCUTANEOUS
Qty: 4 PEN | Refills: 3 | Status: SHIPPED | OUTPATIENT
Start: 2023-06-23 | End: 2023-09-19

## 2023-06-23 RX ORDER — DULAGLUTIDE 3 MG/.5ML
INJECTION, SOLUTION SUBCUTANEOUS
Refills: 0 | OUTPATIENT
Start: 2023-06-23

## 2023-06-23 NOTE — PROGRESS NOTES
CHW - Follow Up    This Community Health Worker completed a follow up visit with patient during clinic visit today.  Pt/Caregiver reported: patient came in office stated that he still in need of housing   Community Health Worker provided: chw spoked with patient and gave him a print out for [employer] and told patient that she would go get him a few application for based on income apartment and he will come pick it up  Follow up required:   Follow-up Outreach - Due: 6/23/2023

## 2023-06-23 NOTE — TELEPHONE ENCOUNTER
----- Message from Rosalie Beltran sent at 6/23/2023 10:33 AM CDT -----  Contact: [phone]  Requesting an RX refill or new RX.  Is this a refill or new RX: New  RX name and strength (copy/paste from chart):  dulaglutide (TRULICITY) 3 mg/0.5 mL pen injector  Is this a 30 day or 90 day RX:   Pharmacy name and phone # (copy/paste from chart):    [facility] [address] [place], [place] - [address]  [address] [facility] [address]  Phone: [phone] Fax: [phone]       The doctors have asked that we provide their patients with the following 2 reminders -- prescription refills can take up to 72 hours, and a friendly reminder that in the future you can use your [facility] account to request refills: Yes    Patient states he is out of medication and is currently at the pharmacy.

## 2023-06-23 NOTE — TELEPHONE ENCOUNTER
No care due was identified.  [facility] Embedded Care Due Messages. Reference number: [id number].   6/23/2023 10:03:55 AM CDT

## 2023-06-23 NOTE — PROGRESS NOTES
CHW - Follow Up    This Community Health Worker completed a follow up visit with patient via telephone today.  Pt/Caregiver reported: patient has came to clinic to  application for based on income apartments   Community Health Worker provided: chw gave patient a few applications for based on income apartments and will follow up in a few weeks  Follow up required:   Follow-up Outreach - Due: 7/13/2023

## 2023-06-23 NOTE — TELEPHONE ENCOUNTER
Refill Decision Note  Quick DC. Request already responded to by other means (e.g. phone or fax)    Haseeb Reilly  is requesting a refill authorization.  Brief Assessment and Rationale for Refill:  Quick Discontinue     Medication Therapy Plan:       Medication Reconciliation Completed: No   Comments:           Note composed:11:24 AM 06/23/2023

## 2023-06-30 ENCOUNTER — PATIENT OUTREACH (OUTPATIENT)
Dept: ADMINISTRATIVE | Facility: OTHER | Age: 53
End: 2023-06-30
Payer: MEDICAID

## 2023-06-30 NOTE — PROGRESS NOTES
CHW - Follow Up    This Community Health Worker completed a follow up visit with patient via telephone today.  Pt/Caregiver reported: patients stated that he has found housing and that he maybe moving in tomorrow 07/01/2023 and that he also on the list for low income housing   Community Health Worker provided: chw will follow up patient and see how everything is going for him  Follow up required:   Follow-up Outreach - Due: 8/9/2023

## 2023-07-05 ENCOUNTER — PATIENT OUTREACH (OUTPATIENT)
Dept: ADMINISTRATIVE | Facility: OTHER | Age: 53
End: 2023-07-05
Payer: MEDICAID

## 2023-07-05 ENCOUNTER — PATIENT MESSAGE (OUTPATIENT)
Dept: PRIMARY CARE CLINIC | Facility: CLINIC | Age: 53
End: 2023-07-05
Payer: MEDICAID

## 2023-07-05 ENCOUNTER — TELEPHONE (OUTPATIENT)
Dept: PRIMARY CARE CLINIC | Facility: CLINIC | Age: 53
End: 2023-07-05
Payer: MEDICAID

## 2023-07-05 DIAGNOSIS — M54.41 CHRONIC RIGHT-SIDED LOW BACK PAIN WITH RIGHT-SIDED SCIATICA: ICD-10-CM

## 2023-07-05 DIAGNOSIS — G62.9 POLYNEUROPATHY: ICD-10-CM

## 2023-07-05 DIAGNOSIS — G89.29 CHRONIC RIGHT-SIDED LOW BACK PAIN WITH RIGHT-SIDED SCIATICA: ICD-10-CM

## 2023-07-05 DIAGNOSIS — I10 ESSENTIAL HYPERTENSION: ICD-10-CM

## 2023-07-05 DIAGNOSIS — E11.9 TYPE 2 DIABETES MELLITUS WITHOUT COMPLICATION, WITHOUT LONG-TERM CURRENT USE OF INSULIN: ICD-10-CM

## 2023-07-05 DIAGNOSIS — Z85.040 HISTORY OF MALIGNANT CARCINOID TUMOR OF RECTUM: Primary | ICD-10-CM

## 2023-07-05 RX ORDER — AMITRIPTYLINE HYDROCHLORIDE 25 MG/1
TABLET, FILM COATED ORAL
Qty: 90 TABLET | Refills: 3 | Status: SHIPPED | OUTPATIENT
Start: 2023-07-05 | End: 2023-12-27 | Stop reason: SDUPTHER

## 2023-07-05 RX ORDER — PREGABALIN 75 MG/1
75 CAPSULE ORAL 2 TIMES DAILY
Qty: 60 CAPSULE | Refills: 4 | Status: SHIPPED | OUTPATIENT
Start: 2023-07-05 | End: 2023-12-27 | Stop reason: SDUPTHER

## 2023-07-05 RX ORDER — DULOXETIN HYDROCHLORIDE 60 MG/1
60 CAPSULE, DELAYED RELEASE ORAL DAILY
Qty: 90 CAPSULE | Refills: 3 | Status: SHIPPED | OUTPATIENT
Start: 2023-07-05

## 2023-07-05 RX ORDER — FUROSEMIDE 40 MG/1
40 TABLET ORAL DAILY
Qty: 90 TABLET | Refills: 3 | Status: SHIPPED | OUTPATIENT
Start: 2023-07-05

## 2023-07-05 RX ORDER — DILTIAZEM HYDROCHLORIDE 120 MG/1
CAPSULE, EXTENDED RELEASE ORAL
Qty: 90 CAPSULE | Refills: 3 | Status: SHIPPED | OUTPATIENT
Start: 2023-07-05

## 2023-07-05 RX ORDER — LISINOPRIL AND HYDROCHLOROTHIAZIDE 20; 25 MG/1; MG/1
1 TABLET ORAL DAILY
Qty: 90 TABLET | Refills: 3 | Status: SHIPPED | OUTPATIENT
Start: 2023-07-05 | End: 2024-03-07

## 2023-07-05 NOTE — TELEPHONE ENCOUNTER
Please let patient know that I have placed orders for colonoscopy.      Please let patient know that I have sent refills of all the medications that need refills.

## 2023-07-05 NOTE — TELEPHONE ENCOUNTER
----- Message from Quynh Palomares LPN sent at 7/5/2023 12:50 PM CDT -----  Regarding: Refills and Referral  Contact: [phone]  Refill on all meds and a referral for a colonoscopy.  ----- Message -----  From: Aparna Clark  Sent: 7/5/2023  12:18 PM CDT  To: [facility] Staff    Pt needs ALL Medications refilled         Pt also needs to have order placed for colonoscopy     All medications are going to       [facility] [address] [place] [address] [address]  [address] [facility] [address]  Phone: [phone] Fax: [phone]          Please call and advise @ # [phone]

## 2023-07-05 NOTE — PROGRESS NOTES
CHW - Follow Up    This Community Health Worker completed a follow up visit with patient via telephone today.  Pt/Caregiver reported: patient stated that he was able to find housing and move in he pays rent out of pocket but he is waiting for a call from the low income apartments and [address]  Community Health Worker provided: chw helped patient with low income housing and put him on the waiting list for [address]....chw will follow up with patient in a few weeks   Follow up required:   Follow-up Outreach - Due: 9/6/2023

## 2023-08-14 DIAGNOSIS — K59.04 CHRONIC IDIOPATHIC CONSTIPATION: ICD-10-CM

## 2023-08-14 RX ORDER — LINACLOTIDE 72 UG/1
72 CAPSULE, GELATIN COATED ORAL
Qty: 30 CAPSULE | Refills: 5 | Status: SHIPPED | OUTPATIENT
Start: 2023-08-14 | End: 2023-09-19

## 2023-09-19 ENCOUNTER — OFFICE VISIT (OUTPATIENT)
Dept: PRIMARY CARE CLINIC | Facility: CLINIC | Age: 53
End: 2023-09-19
Payer: MEDICAID

## 2023-09-19 VITALS
HEART RATE: 91 BPM | WEIGHT: 266.63 LBS | DIASTOLIC BLOOD PRESSURE: 84 MMHG | BODY MASS INDEX: 37.19 KG/M2 | SYSTOLIC BLOOD PRESSURE: 122 MMHG

## 2023-09-19 DIAGNOSIS — K59.04 CHRONIC IDIOPATHIC CONSTIPATION: ICD-10-CM

## 2023-09-19 DIAGNOSIS — I10 ESSENTIAL HYPERTENSION: Primary | ICD-10-CM

## 2023-09-19 DIAGNOSIS — Z87.898 HISTORY OF DIZZINESS: ICD-10-CM

## 2023-09-19 DIAGNOSIS — E11.9 TYPE 2 DIABETES MELLITUS WITHOUT COMPLICATION, WITHOUT LONG-TERM CURRENT USE OF INSULIN: ICD-10-CM

## 2023-09-19 PROCEDURE — 3008F BODY MASS INDEX DOCD: CPT | Mod: CPTII,,, | Performed by: FAMILY MEDICINE

## 2023-09-19 PROCEDURE — 3079F PR MOST RECENT DIASTOLIC BLOOD PRESSURE 80-89 MM HG: ICD-10-PCS | Mod: CPTII,,, | Performed by: FAMILY MEDICINE

## 2023-09-19 PROCEDURE — 1160F RVW MEDS BY RX/DR IN RCRD: CPT | Mod: CPTII,,, | Performed by: FAMILY MEDICINE

## 2023-09-19 PROCEDURE — 4010F ACE/ARB THERAPY RXD/TAKEN: CPT | Mod: CPTII,,, | Performed by: FAMILY MEDICINE

## 2023-09-19 PROCEDURE — 4010F PR ACE/ARB THEARPY RXD/TAKEN: ICD-10-PCS | Mod: CPTII,,, | Performed by: FAMILY MEDICINE

## 2023-09-19 PROCEDURE — 1159F MED LIST DOCD IN RCRD: CPT | Mod: CPTII,,, | Performed by: FAMILY MEDICINE

## 2023-09-19 PROCEDURE — 99999 PR PBB SHADOW E&M-EST. PATIENT-LVL III: CPT | Mod: PBBFAC,,, | Performed by: FAMILY MEDICINE

## 2023-09-19 PROCEDURE — 3074F SYST BP LT 130 MM HG: CPT | Mod: CPTII,,, | Performed by: FAMILY MEDICINE

## 2023-09-19 PROCEDURE — 99213 OFFICE O/P EST LOW 20 MIN: CPT | Mod: PBBFAC,PN | Performed by: FAMILY MEDICINE

## 2023-09-19 PROCEDURE — 3008F PR BODY MASS INDEX (BMI) DOCUMENTED: ICD-10-PCS | Mod: CPTII,,, | Performed by: FAMILY MEDICINE

## 2023-09-19 PROCEDURE — 3079F DIAST BP 80-89 MM HG: CPT | Mod: CPTII,,, | Performed by: FAMILY MEDICINE

## 2023-09-19 PROCEDURE — 99999 PR PBB SHADOW E&M-EST. PATIENT-LVL III: ICD-10-PCS | Mod: PBBFAC,,, | Performed by: FAMILY MEDICINE

## 2023-09-19 PROCEDURE — 3044F PR MOST RECENT HEMOGLOBIN A1C LEVEL <7.0%: ICD-10-PCS | Mod: CPTII,,, | Performed by: FAMILY MEDICINE

## 2023-09-19 PROCEDURE — 99214 PR OFFICE/OUTPT VISIT, EST, LEVL IV, 30-39 MIN: ICD-10-PCS | Mod: S$PBB,,, | Performed by: FAMILY MEDICINE

## 2023-09-19 PROCEDURE — 3044F HG A1C LEVEL LT 7.0%: CPT | Mod: CPTII,,, | Performed by: FAMILY MEDICINE

## 2023-09-19 PROCEDURE — 1159F PR MEDICATION LIST DOCUMENTED IN MEDICAL RECORD: ICD-10-PCS | Mod: CPTII,,, | Performed by: FAMILY MEDICINE

## 2023-09-19 PROCEDURE — 3074F PR MOST RECENT SYSTOLIC BLOOD PRESSURE < 130 MM HG: ICD-10-PCS | Mod: CPTII,,, | Performed by: FAMILY MEDICINE

## 2023-09-19 PROCEDURE — 1160F PR REVIEW ALL MEDS BY PRESCRIBER/CLIN PHARMACIST DOCUMENTED: ICD-10-PCS | Mod: CPTII,,, | Performed by: FAMILY MEDICINE

## 2023-09-19 PROCEDURE — 99214 OFFICE O/P EST MOD 30 MIN: CPT | Mod: S$PBB,,, | Performed by: FAMILY MEDICINE

## 2023-09-19 RX ORDER — SEMAGLUTIDE 0.68 MG/ML
0.5 INJECTION, SOLUTION SUBCUTANEOUS
Qty: 3 ML | Refills: 2 | Status: SHIPPED | OUTPATIENT
Start: 2023-09-19 | End: 2024-02-08

## 2023-09-19 RX ORDER — SEMAGLUTIDE 0.68 MG/ML
0.5 INJECTION, SOLUTION SUBCUTANEOUS
Qty: 3 ML | Refills: 2 | Status: SHIPPED | OUTPATIENT
Start: 2023-09-19 | End: 2023-09-19

## 2023-09-19 RX ORDER — LANCETS
EACH MISCELLANEOUS
Qty: 100 EACH | Refills: 3 | Status: SHIPPED | OUTPATIENT
Start: 2023-09-19

## 2023-09-19 RX ORDER — INSULIN PUMP SYRINGE, 3 ML
EACH MISCELLANEOUS
Qty: 1 EACH | Refills: 0 | Status: SHIPPED | OUTPATIENT
Start: 2023-09-19

## 2023-09-19 NOTE — PROGRESS NOTES
Clinic Note  9/19/2023      Subjective:       Patient ID:  Haseeb is a 52 y.o. male being seen for an established visit.    Chief Complaint: Neck Pain and Headache    Follow-up-patient with a history of type 2 diabetes, hyperlipidemia, hypertension, carcinoid tumor of the rectum, osteoarthritis, polyneuropathy, obesity, chronic idiopathic constipation.  Patient currently feeling very stressed as there cutting his disability check and has difficulty paying the bills.      Dizziness-patient had an episode of dizziness and nausea last week when he was outside helping fix a car.    Chronic neck pain, headaches, bilateral shoulder pain-in the process of trying to lose weight to help with the chronic pain.  Declined referral to see pain management.  Currently on amitriptyline, Cymbalta, Lyrica.  Lyrica makes patient very sleepy so only takes it at night     Chronic constipation-72 mcg not helpful.  Previously on 145 mcg which patient does not remember being helpful?    Type 2 diabetes-wanting to get his hemoglobin A1c checked.  Does not have a glucometer at home, insurance does not cover.  Currently on metformin, Jardiance, and Trulicity.  Would like to switch to Ozempic to help with better weight loss.  Previously on Victoza which helped with weight loss      Review of Systems   Constitutional:  Negative for chills, fever, malaise/fatigue and weight loss.   HENT:  Negative for congestion, sinus pain and sore throat.    Respiratory:  Negative for cough, shortness of breath and wheezing.    Cardiovascular:  Negative for chest pain and palpitations.   Gastrointestinal:  Negative for constipation, diarrhea, nausea and vomiting.   Genitourinary:  Negative for dysuria, frequency and urgency.   Musculoskeletal:  Negative for myalgias.   Skin:  Negative for rash.   Neurological:  Negative for headaches.       Medication List with Changes/Refills   New Medications    BLOOD SUGAR DIAGNOSTIC STRP    Check blood sugar daily  "before meals (goal )    BLOOD-GLUCOSE METER KIT    Check blood sugar 3 times daily before meals (goal )    LANCETS MISC    Check blood sugar daily before meals (goal )    LINACLOTIDE (LINZESS) 145 MCG CAP CAPSULE    Take 1 capsule (145 mcg total) by mouth before breakfast. constipation    SEMAGLUTIDE (OZEMPIC) 0.25 MG OR 0.5 MG (2 MG/3 ML) PEN INJECTOR    Inject 0.5 mg into the skin every 7 days.   Current Medications    AMITRIPTYLINE (ELAVIL) 25 MG TABLET    TAKE 1 TABLET nightly for back pain and sleep    ATORVASTATIN (LIPITOR) 40 MG TABLET    TAKE 1 TABLET BY MOUTH IN THE EVENING FOR HIGH CHOLESTEROL    CYCLOBENZAPRINE (FLEXERIL) 10 MG TABLET    Take 1 tablet (10 mg total) by mouth 3 (three) times daily.    DICYCLOMINE (BENTYL) 20 MG TABLET    Take 1 tablet (20 mg total) by mouth 3 (three) times daily as needed (abdominal pain).    DILTIAZEM (DILACOR XR) 120 MG CDCR    TAKE 1 CAPSULE BY MOUTH ONCE DAILY FOR HIGH BLOOD PRESSURE    DULOXETINE (CYMBALTA) 60 MG CAPSULE    Take 1 capsule (60 mg total) by mouth once daily.    EMPAGLIFLOZIN (JARDIANCE) 10 MG TABLET    TAKE 1 TABLET BY MOUTH ONCE DAILY FOR DIABETES    FAMOTIDINE (PEPCID) 40 MG TABLET    TAKE 1 TABLET BY MOUTH NIGHTLY AS NEEDED FOR HEARTBURN    FUROSEMIDE (LASIX) 40 MG TABLET    Take 1 tablet (40 mg total) by mouth once daily. Water pill    LISINOPRIL-HYDROCHLOROTHIAZIDE (PRINZIDE,ZESTORETIC) 20-25 MG TAB    Take 1 tablet by mouth once daily.    METFORMIN (GLUCOPHAGE) 1000 MG TABLET    TAKE 1 TABLET BY MOUTH TWICE DAILY WITH MEALS    OMEPRAZOLE (PRILOSEC) 40 MG CAPSULE    TAKE 1 CAPSULE BY MOUTH ONCE DAILY ACID  REFLUX    PEN NEEDLE, DIABETIC (BD ULTRA-FINE SHORT PEN NEEDLE) 31 GAUGE X 5/16" NDLE    USE 1 PEN NEEDLE TO INJECT INSULIN ONCE DAILY.    PREGABALIN (LYRICA) 75 MG CAPSULE    Take 1 capsule (75 mg total) by mouth 2 (two) times daily.   Discontinued Medications    BLOOD[place]GLUCOSE MET[place] [facility]    Accu-Chek Guide Me Glucose Meter   " "CHECK BLOOD GLUCOSE TWICE DAILY FOR DIABETES    CHOLECALCIFEROL, VITAMIN D3, 1,250 MCG (50,000 UNIT) CAPSULE    Take 50,000 Units by mouth once a week.    CLONIDINE 0.2 MG/24 HR TD PTWK (CATAPRES) 0.2 MG/24 HR    1 patch to skin    DULAGLUTIDE (TRULICITY) 3 MG/0.5 ML PEN INJECTOR    Inject 3 mg into the skin every 7 days.    LANCETS (ACCU-CHEK FASTCLIX LANCET DRUM [facility])    Accu-Chek Fastclix Lancet Drum   USE TO CHECK GLUCOSE TWICE DAILY FOR DIABETES    LINACLOTIDE (LINZESS) 72 MCG CAP CAPSULE    Take 1 capsule by mouth once daily    PEN NEEDLE, DIABETIC 31 GAUGE X 1/4" NDLE    pen needle, diabetic 31 gauge x 1/4"   USE 1 PEN NEEDLE WITH VICTOZA ONCE DAILY    PEN NEEDLE, DIABETIC 31 GAUGE X 5/16" NDLE    BD Ultra-Fine Short Pen Needle 31 gauge x 5/16"   USE 1 PEN NEEDLE TO INJECT INSULIN ONCE DAILY.       Patient Active Problem List   Diagnosis    Chronic right-sided low back pain with right-sided sciatica    Type 2 diabetes mellitus without complication, without long-term current use of insulin    Constipation    Essential hypertension    Hyperlipidemia    Osteoarthritis of spine with radiculopathy, lumbar region    Polyneuropathy    Decreased ROM of right shoulder    Decreased strength of upper extremity    Poor posture    Erectile dysfunction    Vitamin B12 deficiency    Class 2 severe obesity due to excess calories with serious comorbidity and body mass index (BMI) of 38.0 to 38.9 in adult    History of malignant carcinoid tumor of rectum           Objective:      /84 (BP Location: Right arm, Patient Position: Sitting)   Pulse 91   Wt 120.9 kg (266 lb 10.3 oz)   BMI 37.19 kg/m²   Estimated body mass index is 37.19 kg/m² as calculated from the following:    Height as of 4/21/23: 5' 11" (1.803 m).    Weight as of this encounter: 120.9 kg (266 lb 10.3 oz).  Physical Exam  Vitals reviewed.   Constitutional:       General: He is not in acute distress.     Appearance: He is obese. He is not diaphoretic. "   HENT:      Head: Normocephalic and atraumatic.   Eyes:      Conjunctiva/sclera: Conjunctivae normal.   Cardiovascular:      Rate and Rhythm: Normal rate and regular rhythm.      Heart sounds: Normal heart sounds.   Pulmonary:      Effort: Pulmonary effort is normal. No respiratory distress.      Breath sounds: Normal breath sounds. No wheezing.   Abdominal:      General: Bowel sounds are normal.      Palpations: Abdomen is soft.   Musculoskeletal:         General: Normal range of motion.      Cervical back: Normal range of motion.   Skin:     General: Skin is warm and dry.      Findings: No erythema or rash.   Neurological:      Mental Status: He is alert and oriented to person, place, and time.   Psychiatric:         Mood and Affect: Mood and affect normal.         Behavior: Behavior normal.         Thought Content: Thought content normal.         Judgment: Judgment normal.           Assessment and Plan:     1. Essential hypertension  - blood pressure controlled  - CBC Auto Differential; Future  - Comprehensive Metabolic Panel; Future    2. Type 2 diabetes mellitus without complication, without long-term current use of insulin  - diabetes previously controlled, rechecking today.  Continue metformin 1000 mg b.i.d., Jardiance 10 mg q.d., and switch from Trulicity to Ozempic 0.5 mg once weekly  - Hemoglobin A1C; Future  - blood-glucose meter kit; Check blood sugar 3 times daily before meals (goal )  Dispense: 1 each; Refill: 0  - lancets Misc; Check blood sugar daily before meals (goal )  Dispense: 100 each; Refill: 3  - blood sugar diagnostic Strp; Check blood sugar daily before meals (goal )  Dispense: 100 each; Refill: 3  - semaglutide (OZEMPIC) 0.25 mg or 0.5 mg (2 mg/3 mL) pen injector; Inject 0.5 mg into the skin every 7 days.  Dispense: 3 mL; Refill: 2    3. History of dizziness  - new glucometer sent, discussed with patient that symptoms could be vasovagal presyncope as patient was outside  in the heat    5. Chronic idiopathic constipation  - increase to Linzess 145 mcg  - linaCLOtide (LINZESS) 145 mcg Cap capsule; Take 1 capsule (145 mcg total) by mouth before breakfast. constipation  Dispense: 90 capsule; Refill: 3      Follow Up:   No follow-ups on file.    Other Orders Placed This Visit:  Orders Placed This Encounter   Procedures    CBC Auto Differential    Comprehensive Metabolic Panel    Hemoglobin A1C         Aman Patino MD        This note is dictated on M*Modal word recognition program.  There are word recognition mistakes that are occasionally missed on review.

## 2023-09-20 ENCOUNTER — LAB VISIT (OUTPATIENT)
Dept: PRIMARY CARE CLINIC | Facility: CLINIC | Age: 53
End: 2023-09-20
Payer: MEDICAID

## 2023-09-20 ENCOUNTER — TELEPHONE (OUTPATIENT)
Dept: PRIMARY CARE CLINIC | Facility: CLINIC | Age: 53
End: 2023-09-20
Payer: MEDICAID

## 2023-09-20 DIAGNOSIS — E11.9 TYPE 2 DIABETES MELLITUS WITHOUT COMPLICATION, WITHOUT LONG-TERM CURRENT USE OF INSULIN: ICD-10-CM

## 2023-09-20 DIAGNOSIS — I10 ESSENTIAL HYPERTENSION: ICD-10-CM

## 2023-09-20 LAB
ALBUMIN SERPL BCP-MCNC: 3.6 G/DL (ref 3.5–5.2)
ALP SERPL-CCNC: 77 U/L (ref 55–135)
ALT SERPL W/O P-5'-P-CCNC: 15 U/L (ref 10–44)
ANION GAP SERPL CALC-SCNC: 11 MMOL/L (ref 8–16)
AST SERPL-CCNC: 19 U/L (ref 10–40)
BASOPHILS # BLD AUTO: 0.06 K/UL (ref 0–0.2)
BASOPHILS NFR BLD: 1 % (ref 0–1.9)
BILIRUB SERPL-MCNC: 0.5 MG/DL (ref 0.1–1)
BUN SERPL-MCNC: 17 MG/DL (ref 6–20)
CALCIUM SERPL-MCNC: 9.7 MG/DL (ref 8.7–10.5)
CHLORIDE SERPL-SCNC: 101 MMOL/L (ref 95–110)
CO2 SERPL-SCNC: 27 MMOL/L (ref 23–29)
CREAT SERPL-MCNC: 1.3 MG/DL (ref 0.5–1.4)
DIFFERENTIAL METHOD: ABNORMAL
EOSINOPHIL # BLD AUTO: 0.2 K/UL (ref 0–0.5)
EOSINOPHIL NFR BLD: 2.8 % (ref 0–8)
ERYTHROCYTE [DISTWIDTH] IN BLOOD BY AUTOMATED COUNT: 13.6 % (ref 11.5–14.5)
EST. GFR  (NO RACE VARIABLE): >60 ML/MIN/1.73 M^2
ESTIMATED AVG GLUCOSE: 123 MG/DL (ref 68–131)
GLUCOSE SERPL-MCNC: 168 MG/DL (ref 70–110)
HBA1C MFR BLD: 5.9 % (ref 4–5.6)
HCT VFR BLD AUTO: 45.9 % (ref 40–54)
HGB BLD-MCNC: 14.3 G/DL (ref 14–18)
IMM GRANULOCYTES # BLD AUTO: 0.01 K/UL (ref 0–0.04)
IMM GRANULOCYTES NFR BLD AUTO: 0.2 % (ref 0–0.5)
LYMPHOCYTES # BLD AUTO: 2.9 K/UL (ref 1–4.8)
LYMPHOCYTES NFR BLD: 47.1 % (ref 18–48)
MCH RBC QN AUTO: 29.5 PG (ref 27–31)
MCHC RBC AUTO-ENTMCNC: 31.2 G/DL (ref 32–36)
MCV RBC AUTO: 95 FL (ref 82–98)
MONOCYTES # BLD AUTO: 0.6 K/UL (ref 0.3–1)
MONOCYTES NFR BLD: 9.7 % (ref 4–15)
NEUTROPHILS # BLD AUTO: 2.4 K/UL (ref 1.8–7.7)
NEUTROPHILS NFR BLD: 39.2 % (ref 38–73)
NRBC BLD-RTO: 0 /100 WBC
PLATELET # BLD AUTO: 472 K/UL (ref 150–450)
PMV BLD AUTO: 9.6 FL (ref 9.2–12.9)
POTASSIUM SERPL-SCNC: 3.8 MMOL/L (ref 3.5–5.1)
PROT SERPL-MCNC: 8 G/DL (ref 6–8.4)
RBC # BLD AUTO: 4.85 M/UL (ref 4.6–6.2)
SODIUM SERPL-SCNC: 139 MMOL/L (ref 136–145)
WBC # BLD AUTO: 6.18 K/UL (ref 3.9–12.7)

## 2023-09-20 PROCEDURE — 85025 COMPLETE CBC W/AUTO DIFF WBC: CPT | Performed by: FAMILY MEDICINE

## 2023-09-20 PROCEDURE — 83036 HEMOGLOBIN GLYCOSYLATED A1C: CPT | Performed by: FAMILY MEDICINE

## 2023-09-20 PROCEDURE — 80053 COMPREHEN METABOLIC PANEL: CPT | Performed by: FAMILY MEDICINE

## 2023-09-20 NOTE — TELEPHONE ENCOUNTER
----- Message from Precious Flores sent at 9/20/2023  8:58 AM CDT -----  1MEDICALADVICE     Patient is calling for Medical Advice regarding: [facility] pharmacy is calling for clarification on the Sig for blood sugar diagnostic Str        Pharmacy name and phone#:  [id number] - [place], [place] - [address]  [address] [facility] [address]  Phone: [phone] Fax: [phone]        Comments:

## 2023-10-06 ENCOUNTER — PATIENT OUTREACH (OUTPATIENT)
Dept: ADMINISTRATIVE | Facility: OTHER | Age: 53
End: 2023-10-06
Payer: MEDICAID

## 2023-10-06 NOTE — PROGRESS NOTES
CHW - Follow Up    This Community Health Worker completed a follow up visit with patient via telephone today.  Pt/Caregiver reported: Patient stated that he does not need assistance at this time   Community Health Worker provided: Chw will follow up in a few weeks   Follow up required:   Follow-up Outreach - Due: 8/29/2024

## 2023-10-16 ENCOUNTER — PATIENT OUTREACH (OUTPATIENT)
Dept: PRIMARY CARE CLINIC | Facility: CLINIC | Age: 53
End: 2023-10-16
Payer: MEDICAID

## 2023-10-16 DIAGNOSIS — E11.9 TYPE 2 DIABETES MELLITUS WITHOUT COMPLICATION, WITHOUT LONG-TERM CURRENT USE OF INSULIN: Primary | ICD-10-CM

## 2023-10-16 NOTE — PROGRESS NOTES
Health Maintenance Due   Topic Date Due    Diabetes Urine Screening  Never done    Foot Exam  Never done    Influenza Vaccine (1) Never done    COVID-19 Vaccine (4 - 2023-24 season) 09/01/2023    Friend[facility] health reminder mailed

## 2023-10-16 NOTE — LETTER
October 16, 2023    Haseeb Reilly  [address]  [place] [address]             Dear Haseeb    In addition to helping you feel better when you are sick, we are interested in preventing illness and injury in the first place.  Screening tests and routine follow up tests when you have certain medical problems are very essential in helping you stay as healthy as possible. In the spirit of maintaining your health, our system indicates that you are due for the following:    Health Maintenance Due   Topic Date Due    Diabetes Urine Screening  Never done    Foot Exam  Never done    Influenza Vaccine (1) Never done    COVID-19 Vaccine (4 - 2023-24 season) 09/01/2023        Please be prepared to discuss these recommended tests at your next visit.  If you haven't had a visit within the past one year please call [phone] to schedule or request an appointment.    Sincerely,       Your Health Care Team

## 2023-10-23 ENCOUNTER — HOSPITAL ENCOUNTER (OUTPATIENT)
Dept: RADIOLOGY | Facility: HOSPITAL | Age: 53
Discharge: HOME | End: 2023-10-23
Attending: PODIATRIST
Payer: MEDICAID

## 2023-10-23 ENCOUNTER — OFFICE VISIT (OUTPATIENT)
Dept: PODIATRY | Facility: CLINIC | Age: 53
End: 2023-10-23
Payer: MEDICAID

## 2023-10-23 VITALS
WEIGHT: 268.94 LBS | RESPIRATION RATE: 18 BRPM | SYSTOLIC BLOOD PRESSURE: 119 MMHG | HEART RATE: 90 BPM | HEIGHT: 71 IN | BODY MASS INDEX: 37.65 KG/M2 | DIASTOLIC BLOOD PRESSURE: 80 MMHG

## 2023-10-23 DIAGNOSIS — M47.26 OSTEOARTHRITIS OF SPINE WITH RADICULOPATHY, LUMBAR REGION: ICD-10-CM

## 2023-10-23 DIAGNOSIS — M79.672 FOOT PAIN, LEFT: ICD-10-CM

## 2023-10-23 DIAGNOSIS — M79.2 NEURITIS: ICD-10-CM

## 2023-10-23 DIAGNOSIS — E11.9 TYPE 2 DIABETES MELLITUS WITHOUT COMPLICATION, WITHOUT LONG-TERM CURRENT USE OF INSULIN: ICD-10-CM

## 2023-10-23 DIAGNOSIS — E11.9 ENCOUNTER FOR DIABETIC FOOT EXAM: ICD-10-CM

## 2023-10-23 DIAGNOSIS — M79.672 FOOT PAIN, LEFT: Primary | ICD-10-CM

## 2023-10-23 PROCEDURE — 1160F RVW MEDS BY RX/DR IN RCRD: CPT | Mod: CPTII,,, | Performed by: PODIATRIST

## 2023-10-23 PROCEDURE — 3044F PR MOST RECENT HEMOGLOBIN A1C LEVEL <7.0%: ICD-10-PCS | Mod: CPTII,,, | Performed by: PODIATRIST

## 2023-10-23 PROCEDURE — 3044F HG A1C LEVEL LT 7.0%: CPT | Mod: CPTII,,, | Performed by: PODIATRIST

## 2023-10-23 PROCEDURE — 4010F PR ACE/ARB THEARPY RXD/TAKEN: ICD-10-PCS | Mod: CPTII,,, | Performed by: PODIATRIST

## 2023-10-23 PROCEDURE — 1159F MED LIST DOCD IN RCRD: CPT | Mod: CPTII,,, | Performed by: PODIATRIST

## 2023-10-23 PROCEDURE — 3008F PR BODY MASS INDEX (BMI) DOCUMENTED: ICD-10-PCS | Mod: CPTII,,, | Performed by: PODIATRIST

## 2023-10-23 PROCEDURE — 1160F PR REVIEW ALL MEDS BY PRESCRIBER/CLIN PHARMACIST DOCUMENTED: ICD-10-PCS | Mod: CPTII,,, | Performed by: PODIATRIST

## 2023-10-23 PROCEDURE — 73630 X-RAY EXAM OF FOOT: CPT | Mod: TC,PN,LT

## 2023-10-23 PROCEDURE — 99999 PR PBB SHADOW E&M-EST. PATIENT-LVL V: CPT | Mod: PBBFAC,,, | Performed by: PODIATRIST

## 2023-10-23 PROCEDURE — 99204 PR OFFICE/OUTPT VISIT, NEW, LEVL IV, 45-59 MIN: ICD-10-PCS | Mod: S$PBB,,, | Performed by: PODIATRIST

## 2023-10-23 PROCEDURE — 3079F DIAST BP 80-89 MM HG: CPT | Mod: CPTII,,, | Performed by: PODIATRIST

## 2023-10-23 PROCEDURE — 3074F SYST BP LT 130 MM HG: CPT | Mod: CPTII,,, | Performed by: PODIATRIST

## 2023-10-23 PROCEDURE — 73630 X-RAY EXAM OF FOOT: CPT | Mod: 26,LT,, | Performed by: RADIOLOGY

## 2023-10-23 PROCEDURE — 3008F BODY MASS INDEX DOCD: CPT | Mod: CPTII,,, | Performed by: PODIATRIST

## 2023-10-23 PROCEDURE — 3079F PR MOST RECENT DIASTOLIC BLOOD PRESSURE 80-89 MM HG: ICD-10-PCS | Mod: CPTII,,, | Performed by: PODIATRIST

## 2023-10-23 PROCEDURE — 73630 XR FOOT COMPLETE 3 VIEW LEFT: ICD-10-PCS | Mod: 26,LT,, | Performed by: RADIOLOGY

## 2023-10-23 PROCEDURE — 99204 OFFICE O/P NEW MOD 45 MIN: CPT | Mod: S$PBB,,, | Performed by: PODIATRIST

## 2023-10-23 PROCEDURE — 99215 OFFICE O/P EST HI 40 MIN: CPT | Mod: PBBFAC,PN | Performed by: PODIATRIST

## 2023-10-23 PROCEDURE — 4010F ACE/ARB THERAPY RXD/TAKEN: CPT | Mod: CPTII,,, | Performed by: PODIATRIST

## 2023-10-23 PROCEDURE — 1159F PR MEDICATION LIST DOCUMENTED IN MEDICAL RECORD: ICD-10-PCS | Mod: CPTII,,, | Performed by: PODIATRIST

## 2023-10-23 PROCEDURE — 3074F PR MOST RECENT SYSTOLIC BLOOD PRESSURE < 130 MM HG: ICD-10-PCS | Mod: CPTII,,, | Performed by: PODIATRIST

## 2023-10-23 PROCEDURE — 99999 PR PBB SHADOW E&M-EST. PATIENT-LVL V: ICD-10-PCS | Mod: PBBFAC,,, | Performed by: PODIATRIST

## 2023-10-23 RX ORDER — POTASSIUM CHLORIDE 600 MG/1
1 TABLET, FILM COATED, EXTENDED RELEASE ORAL DAILY
COMMUNITY

## 2023-10-23 RX ORDER — DULAGLUTIDE 3 MG/.5ML
INJECTION, SOLUTION SUBCUTANEOUS
COMMUNITY
End: 2024-02-08

## 2023-10-23 RX ORDER — LIRAGLUTIDE 6 MG/ML
INJECTION SUBCUTANEOUS
COMMUNITY
End: 2024-02-08

## 2023-10-23 RX ORDER — LISINOPRIL AND HYDROCHLOROTHIAZIDE 20; 25 MG/1; MG/1
1 TABLET ORAL DAILY
COMMUNITY

## 2023-10-23 RX ORDER — ERGOCALCIFEROL 1.25 MG/1
1 CAPSULE ORAL
COMMUNITY

## 2023-10-23 RX ORDER — CLONIDINE HYDROCHLORIDE 0.2 MG/1
1 TABLET ORAL DAILY
COMMUNITY

## 2023-10-23 NOTE — PROGRESS NOTES
"Subjective:      Patient ID: Haseeb Reilly is a 52 y.o. male.    Chief Complaint:   PCP (Aman Patino MD   9/19/23/Family Medicine), Diabetic Foot Exam, and Foot Problem (Numbness, feels like the toes are getting cut off )       Haseeb is a 52 y.o. male who presents to the clinic for evaluation and treatment of high risk feet. Haseeb has a past medical history of Diabetes mellitus, Hypertension, and Mixed hyperlipidemia.     Patient has not seen Podiatry in the past    Patient relates that his left 2nd and 3rd toe feel like he has "carpal tunnel symptoms all the time"  Like a nerve type pain  Denies any injury    He has been to multiple neurologist for his numbness and tingling in his arms and legs and  relates they can not find anything  Patient relates that he got off his gabapentin because it did not help  He is taking something to help him sleep/Flexeril    He has some bulging disc in his back but he is not actively being treated he has been referred to pain management    PCP: Aman Patino MD    Date Last Seen by PCP: 9/19/23    Current shoe gear:  Affected Foot: Casual shoes     Unaffected Foot: Casual shoes    Hemoglobin A1C   Date Value Ref Range Status   09/20/2023 5.9 (H) 4.0 - 5.6 % Final     Comment:     ADA Screening Guidelines:  5.7-6.4%  Consistent with prediabetes  >or=6.5%  Consistent with diabetes    High levels of fetal hemoglobin interfere with the HbA1C  assay. Heterozygous hemoglobin variants (HbS, HgC, etc)do  not significantly interfere with this assay.   However, presence of multiple variants may affect accuracy.     02/08/2023 6.1 (H) 4.0 - 5.6 % Final     Comment:     ADA Screening Guidelines:  5.7-6.4%  Consistent with prediabetes  >or=6.5%  Consistent with diabetes    High levels of fetal hemoglobin interfere with the HbA1C  assay. Heterozygous hemoglobin variants (HbS, HgC, etc)do  not significantly interfere with this assay.   However, presence of multiple variants may affect " accuracy.     08/02/2022 6.4 (H) 4.0 - 5.6 % Final     Comment:     ADA Screening Guidelines:  5.7-6.4%  Consistent with prediabetes  >or=6.5%  Consistent with diabetes    High levels of fetal hemoglobin interfere with the HbA1C  assay. Heterozygous hemoglobin variants (HbS, HgC, etc)do  not significantly interfere with this assay.   However, presence of multiple variants may affect accuracy.       Past Medical History:   Diagnosis Date    Diabetes mellitus     Hypertension     Mixed hyperlipidemia      History reviewed. No pertinent surgical history.  Current Outpatient Medications on File Prior to Visit   Medication Sig Dispense Refill    amitriptyline (ELAVIL) 25 MG tablet TAKE 1 TABLET nightly for back pain and sleep 90 tablet 3    atorvastatin (LIPITOR) 40 MG tablet TAKE 1 TABLET BY MOUTH IN THE EVENING FOR HIGH CHOLESTEROL 90 tablet 2    blood sugar diagnostic Strp Check blood sugar daily before meals (goal ) 100 each 3    blood-glucose meter kit Check blood sugar 3 times daily before meals (goal ) 1 each 0    cloNIDine (CATAPRES) 0.2 MG tablet Take 1 tablet by mouth once daily.      cyclobenzaprine (FLEXERIL) 10 MG tablet Take 1 tablet (10 mg total) by mouth 3 (three) times daily. 90 tablet 6    dicyclomine (BENTYL) 20 mg tablet Take 1 tablet (20 mg total) by mouth 3 (three) times daily as needed (abdominal pain). 60 tablet 2    diltiaZEM (DILACOR XR) 120 MG CDCR TAKE 1 CAPSULE BY MOUTH ONCE DAILY FOR HIGH BLOOD PRESSURE 90 capsule 3    dulaglutide (TRULICITY) 3 mg/0.5 mL pen injector INJECT 3MG INTO THE SKIN EVERY 7 DAYS      DULoxetine (CYMBALTA) 60 MG capsule Take 1 capsule (60 mg total) by mouth once daily. 90 capsule 3    empagliflozin (JARDIANCE) 10 mg tablet TAKE 1 TABLET BY MOUTH ONCE DAILY FOR DIABETES 90 tablet 3    ergocalciferol (ERGOCALCIFEROL) 50,000 unit Cap Take 1 capsule by mouth every 7 days.      famotidine (PEPCID) 40 MG tablet TAKE 1 TABLET BY MOUTH NIGHTLY AS NEEDED FOR  "HEARTBURN 90 tablet 3    furosemide (LASIX) 40 MG tablet Take 1 tablet (40 mg total) by mouth once daily. Water pill 90 tablet 3    lancets Misc Check blood sugar daily before meals (goal ) 100 each 3    linaCLOtide (LINZESS) 145 mcg Cap capsule Take 1 capsule (145 mcg total) by mouth before breakfast. constipation 90 capsule 3    liraglutide 0.6 mg/0.1 mL, 18 mg/3 mL, subq PNIJ (VICTOZA 2-TIGRE) 0.6 mg/0.1 mL (18 mg/3 mL) PnIj pen Inject 1.2 mg every day by subcutaneous route.      lisinopriL-hydrochlorothiazide (PRINZIDE,ZESTORETIC) 20-25 mg Tab Take 1 tablet by mouth once daily. 90 tablet 3    lisinopriL-hydrochlorothiazide (PRINZIDE,ZESTORETIC) 20-25 mg Tab Take 1 tablet by mouth once daily.      metFORMIN (GLUCOPHAGE) 1000 MG tablet TAKE 1 TABLET BY MOUTH TWICE DAILY WITH MEALS 180 tablet 3    omeprazole (PRILOSEC) 40 MG capsule TAKE 1 CAPSULE BY MOUTH ONCE DAILY ACID  REFLUX 90 capsule 3    pen needle, diabetic (BD ULTRA-FINE SHORT PEN NEEDLE) 31 gauge x 5/16" Ndle USE 1 PEN NEEDLE TO INJECT INSULIN ONCE DAILY. 100 each 0    potassium chloride (KLOR-CON) 8 MEQ TbSR Take 1 tablet by mouth once daily.      pregabalin (LYRICA) 75 MG capsule Take 1 capsule (75 mg total) by mouth 2 (two) times daily. 60 capsule 4    semaglutide (OZEMPIC) 0.25 mg or 0.5 mg (2 mg/3 mL) pen injector Inject 0.5 mg into the skin every 7 days. 3 mL 2     No current facility-administered medications on file prior to visit.     Review of patient's allergies indicates:   Allergen Reactions    Levonorgestrel-ethinyl estrad      Other reaction(s): sinus, running nose, cough, etc.    Morphine      Other reaction(s): Not available       Review of Systems   Constitutional: Negative for chills, decreased appetite, fever, malaise/fatigue, night sweats, weight gain and weight loss.   Cardiovascular:  Negative for chest pain, claudication, dyspnea on exertion, leg swelling, palpitations and syncope.   Respiratory:  Negative for cough and " "shortness of breath.    Endocrine: Negative for cold intolerance and heat intolerance.   Hematologic/Lymphatic: Negative for bleeding problem. Does not bruise/bleed easily.   Skin:  Negative for color change, dry skin, flushing, itching, nail changes, poor wound healing, rash, skin cancer, suspicious lesions and unusual hair distribution.   Musculoskeletal:  Positive for back pain. Negative for arthritis, falls, gout, joint pain, joint swelling, muscle cramps, muscle weakness, myalgias, neck pain and stiffness.   Gastrointestinal:  Negative for diarrhea, nausea and vomiting.   Neurological:  Positive for numbness and paresthesias. Negative for dizziness, focal weakness, light-headedness, tremors, vertigo and weakness.   Psychiatric/Behavioral:  Negative for altered mental status and depression. The patient does not have insomnia.    Allergic/Immunologic: Negative.            Objective:       Vitals:    10/23/23 1414   BP: 119/80   Pulse: 90   Resp: 18   Weight: 122 kg (268 lb 15.4 oz)   Height: 5' 11" (1.803 m)   PainSc: 0-No pain   122 kg (268 lb 15.4 oz)     Physical Exam  Vitals reviewed.   Constitutional:       General: He is not in acute distress.     Appearance: He is well-developed. He is not ill-appearing, toxic-appearing or diaphoretic.      Comments:       Cardiovascular:      Pulses:           Dorsalis pedis pulses are 2+ on the right side and 2+ on the left side.        Posterior tibial pulses are 2+ on the right side and 2+ on the left side.   Musculoskeletal:         General: No swelling.      Right lower leg: No edema.      Left lower leg: No edema.      Right ankle: Normal.      Right Achilles Tendon: Normal.      Left ankle: Normal.      Left Achilles Tendon: Normal.      Right foot: Decreased range of motion. Deformity and prominent metatarsal heads present. No tenderness or bony tenderness.      Left foot: Decreased range of motion. Deformity, prominent metatarsal heads, tenderness and bony " tenderness present.      Comments: Positive diffuse pain on palpation to entire left foot with more specifically to distal forefoot 2nd and 3rd digits left       Feet:      Right foot:      Protective Sensation: 10 sites tested.  10 sites sensed.      Skin integrity: Dry skin present. No ulcer, blister, skin breakdown, erythema, warmth or callus.      Toenail Condition: Right toenails are normal.      Left foot:      Protective Sensation: 10 sites tested.  6 sites sensed.      Skin integrity: Dry skin present. No ulcer, blister, skin breakdown, erythema, warmth or callus.      Toenail Condition: Left toenails are normal.      Comments: Decreased sensation left foot digits as well as hypersensitivity to 2nd digit left  Skin:     General: Skin is warm and dry.      Capillary Refill: Capillary refill takes 2 to 3 seconds.      Coloration: Skin is not pale.      Findings: No erythema or rash.      Nails: There is no clubbing.   Neurological:      Mental Status: He is alert and oriented to person, place, and time.      Sensory: Sensory deficit present.      Gait: Gait abnormal.   Psychiatric:         Attention and Perception: Attention normal.         Mood and Affect: Mood normal.         Speech: Speech normal.         Behavior: Behavior normal.         Thought Content: Thought content normal.         Cognition and Memory: Cognition normal.         Judgment: Judgment normal.               Assessment:       Encounter Diagnoses   Name Primary?    Type 2 diabetes mellitus without complication, without long-term current use of insulin     Foot pain, left Yes    Osteoarthritis of spine with radiculopathy, lumbar region     Neuritis     Encounter for diabetic foot exam          Plan:       Haseeb was seen today for pcp, diabetic foot exam and foot problem.    Diagnoses and all orders for this visit:    Foot pain, left  -     X-Ray Foot Complete Left; Future    Type 2 diabetes mellitus without complication, without long-term  current use of insulin  -     Ambulatory referral/consult to Podiatry    Osteoarthritis of spine with radiculopathy, lumbar region    Neuritis    Encounter for diabetic foot exam      10/36/23  Pain mangeament [place] pain appt coming up    Shoe inspection. Diabetic Foot Education. Patient reminded of the importance of good nutrition and blood sugar control to help prevent podiatric complications of diabetes. Patient instructed on proper foot hygeine. We discussed wearing proper shoe gear, daily foot inspections, never walking without protective shoe gear, never putting sharp instruments to feet        DM foot exam    Vascular intact.     I counseled the patient on his conditions, their implications and medical management.    Pt needs to return to pain management/back/hip eval because that could be cause of left foot neuritis/pain    Recommend xray to r/o stress fracture    F/u with Dr. Chance for possible nerve injection/surgical release                No follow-ups on file.

## 2023-10-24 ENCOUNTER — TELEPHONE (OUTPATIENT)
Dept: PODIATRY | Facility: CLINIC | Age: 53
End: 2023-10-24
Payer: MEDICAID

## 2023-10-24 NOTE — TELEPHONE ENCOUNTER
Spoke with patient gave him x-ray result per Dr Carrillo and schedule a consult appointment with Dr watson for possible nerve injection or surgical nerve release consult, patient verbalize understanding.

## 2023-10-24 NOTE — TELEPHONE ENCOUNTER
----- Message from Aimee Carrillo DPM sent at 10/24/2023 12:02 PM CDT -----  There no follow-up appointments noted please help make patient an appointment follow up with Dr. Chance for possible nerve injection or surgical nerve release consult    Please let patient know that his x-rays show no fracture

## 2023-10-25 ENCOUNTER — OFFICE VISIT (OUTPATIENT)
Dept: PODIATRY | Facility: CLINIC | Age: 53
End: 2023-10-25
Payer: MEDICAID

## 2023-10-25 VITALS
SYSTOLIC BLOOD PRESSURE: 125 MMHG | BODY MASS INDEX: 37.28 KG/M2 | HEART RATE: 102 BPM | HEIGHT: 71 IN | WEIGHT: 266.31 LBS | DIASTOLIC BLOOD PRESSURE: 89 MMHG

## 2023-10-25 DIAGNOSIS — E11.9 TYPE 2 DIABETES MELLITUS WITHOUT COMPLICATION, WITHOUT LONG-TERM CURRENT USE OF INSULIN: Primary | ICD-10-CM

## 2023-10-25 DIAGNOSIS — M47.26 OSTEOARTHRITIS OF SPINE WITH RADICULOPATHY, LUMBAR REGION: ICD-10-CM

## 2023-10-25 DIAGNOSIS — M79.672 FOOT PAIN, LEFT: ICD-10-CM

## 2023-10-25 DIAGNOSIS — M79.2 NEURITIS: ICD-10-CM

## 2023-10-25 PROCEDURE — 99213 OFFICE O/P EST LOW 20 MIN: CPT | Mod: PBBFAC,25 | Performed by: PODIATRIST

## 2023-10-25 PROCEDURE — 99999 PR PBB SHADOW E&M-EST. PATIENT-LVL III: ICD-10-PCS | Mod: PBBFAC,,, | Performed by: PODIATRIST

## 2023-10-25 PROCEDURE — 3044F PR MOST RECENT HEMOGLOBIN A1C LEVEL <7.0%: ICD-10-PCS | Mod: CPTII,,, | Performed by: PODIATRIST

## 2023-10-25 PROCEDURE — 3079F PR MOST RECENT DIASTOLIC BLOOD PRESSURE 80-89 MM HG: ICD-10-PCS | Mod: CPTII,,, | Performed by: PODIATRIST

## 2023-10-25 PROCEDURE — 4010F PR ACE/ARB THEARPY RXD/TAKEN: ICD-10-PCS | Mod: CPTII,,, | Performed by: PODIATRIST

## 2023-10-25 PROCEDURE — 64455 NJX AA&/STRD PLTR COM DG NRV: CPT | Mod: PBBFAC,LT | Performed by: PODIATRIST

## 2023-10-25 PROCEDURE — 4010F ACE/ARB THERAPY RXD/TAKEN: CPT | Mod: CPTII,,, | Performed by: PODIATRIST

## 2023-10-25 PROCEDURE — 99214 OFFICE O/P EST MOD 30 MIN: CPT | Mod: 25,S$PBB,, | Performed by: PODIATRIST

## 2023-10-25 PROCEDURE — 99999 PR PBB SHADOW E&M-EST. PATIENT-LVL III: CPT | Mod: PBBFAC,,, | Performed by: PODIATRIST

## 2023-10-25 PROCEDURE — 3079F DIAST BP 80-89 MM HG: CPT | Mod: CPTII,,, | Performed by: PODIATRIST

## 2023-10-25 PROCEDURE — 3008F PR BODY MASS INDEX (BMI) DOCUMENTED: ICD-10-PCS | Mod: CPTII,,, | Performed by: PODIATRIST

## 2023-10-25 PROCEDURE — 64455 NJX AA&/STRD PLTR COM DG NRV: CPT | Mod: S$PBB,LT,, | Performed by: PODIATRIST

## 2023-10-25 PROCEDURE — 64455 PR INJECT ANES/STEROID PLANTAR COMMON DIGITAL NERVE: ICD-10-PCS | Mod: S$PBB,LT,, | Performed by: PODIATRIST

## 2023-10-25 PROCEDURE — 3074F SYST BP LT 130 MM HG: CPT | Mod: CPTII,,, | Performed by: PODIATRIST

## 2023-10-25 PROCEDURE — 3008F BODY MASS INDEX DOCD: CPT | Mod: CPTII,,, | Performed by: PODIATRIST

## 2023-10-25 PROCEDURE — 3044F HG A1C LEVEL LT 7.0%: CPT | Mod: CPTII,,, | Performed by: PODIATRIST

## 2023-10-25 PROCEDURE — 3074F PR MOST RECENT SYSTOLIC BLOOD PRESSURE < 130 MM HG: ICD-10-PCS | Mod: CPTII,,, | Performed by: PODIATRIST

## 2023-10-25 PROCEDURE — 99214 PR OFFICE/OUTPT VISIT, EST, LEVL IV, 30-39 MIN: ICD-10-PCS | Mod: 25,S$PBB,, | Performed by: PODIATRIST

## 2023-10-26 PROCEDURE — 99999PBSHW PR PBB SHADOW TECHNICAL ONLY FILED TO HB: Mod: PBBFAC,,,

## 2023-10-26 PROCEDURE — 99999PBSHW PR PBB SHADOW TECHNICAL ONLY FILED TO HB: ICD-10-PCS | Mod: PBBFAC,,,

## 2023-10-26 RX ORDER — TRIAMCINOLONE ACETONIDE 40 MG/ML
40 INJECTION, SUSPENSION INTRA-ARTICULAR; INTRAMUSCULAR
Status: COMPLETED | OUTPATIENT
Start: 2023-10-26 | End: 2023-10-26

## 2023-10-26 RX ADMIN — TRIAMCINOLONE ACETONIDE 40 MG: 40 INJECTION, SUSPENSION INTRA-ARTICULAR; INTRAMUSCULAR at 12:10

## 2023-10-29 NOTE — PROGRESS NOTES
Subjective:      Patient ID: Haseeb Reilly is a 52 y.o. male.    Chief Complaint:   Foot Problem (Left foot numbness/pain)       Haseeb is a 52 y.o. male who presents to the clinic for evaluation and treatment of high risk feet. Haseeb has a past medical history of Diabetes mellitus, Hypertension, and Mixed hyperlipidemia.     Presents for 2nd opinion/possible surgical evaluation for ongoing left foot numbness and pain.  Conservative treatments have failed thus far.    PCP: Aman Patino MD    Date Last Seen by PCP: 9/19/23    Current shoe gear:  Affected Foot: Casual shoes     Unaffected Foot: Casual shoes    Hemoglobin A1C   Date Value Ref Range Status   09/20/2023 5.9 (H) 4.0 - 5.6 % Final     Comment:     ADA Screening Guidelines:  5.7-6.4%  Consistent with prediabetes  >or=6.5%  Consistent with diabetes    High levels of fetal hemoglobin interfere with the HbA1C  assay. Heterozygous hemoglobin variants (HbS, HgC, etc)do  not significantly interfere with this assay.   However, presence of multiple variants may affect accuracy.     02/08/2023 6.1 (H) 4.0 - 5.6 % Final     Comment:     ADA Screening Guidelines:  5.7-6.4%  Consistent with prediabetes  >or=6.5%  Consistent with diabetes    High levels of fetal hemoglobin interfere with the HbA1C  assay. Heterozygous hemoglobin variants (HbS, HgC, etc)do  not significantly interfere with this assay.   However, presence of multiple variants may affect accuracy.     08/02/2022 6.4 (H) 4.0 - 5.6 % Final     Comment:     ADA Screening Guidelines:  5.7-6.4%  Consistent with prediabetes  >or=6.5%  Consistent with diabetes    High levels of fetal hemoglobin interfere with the HbA1C  assay. Heterozygous hemoglobin variants (HbS, HgC, etc)do  not significantly interfere with this assay.   However, presence of multiple variants may affect accuracy.       Past Medical History:   Diagnosis Date    Diabetes mellitus     Hypertension     Mixed hyperlipidemia      No past  surgical history on file.  Current Outpatient Medications on File Prior to Visit   Medication Sig Dispense Refill    amitriptyline (ELAVIL) 25 MG tablet TAKE 1 TABLET nightly for back pain and sleep 90 tablet 3    atorvastatin (LIPITOR) 40 MG tablet TAKE 1 TABLET BY MOUTH IN THE EVENING FOR HIGH CHOLESTEROL 90 tablet 2    blood sugar diagnostic Strp Check blood sugar daily before meals (goal ) 100 each 3    blood-glucose meter kit Check blood sugar 3 times daily before meals (goal ) 1 each 0    cloNIDine (CATAPRES) 0.2 MG tablet Take 1 tablet by mouth once daily.      cyclobenzaprine (FLEXERIL) 10 MG tablet Take 1 tablet (10 mg total) by mouth 3 (three) times daily. 90 tablet 6    dicyclomine (BENTYL) 20 mg tablet Take 1 tablet (20 mg total) by mouth 3 (three) times daily as needed (abdominal pain). 60 tablet 2    diltiaZEM (DILACOR XR) 120 MG CDCR TAKE 1 CAPSULE BY MOUTH ONCE DAILY FOR HIGH BLOOD PRESSURE 90 capsule 3    dulaglutide (TRULICITY) 3 mg/0.5 mL pen injector INJECT 3MG INTO THE SKIN EVERY 7 DAYS      DULoxetine (CYMBALTA) 60 MG capsule Take 1 capsule (60 mg total) by mouth once daily. 90 capsule 3    empagliflozin (JARDIANCE) 10 mg tablet TAKE 1 TABLET BY MOUTH ONCE DAILY FOR DIABETES 90 tablet 3    ergocalciferol (ERGOCALCIFEROL) 50,000 unit Cap Take 1 capsule by mouth every 7 days.      famotidine (PEPCID) 40 MG tablet TAKE 1 TABLET BY MOUTH NIGHTLY AS NEEDED FOR HEARTBURN 90 tablet 3    furosemide (LASIX) 40 MG tablet Take 1 tablet (40 mg total) by mouth once daily. Water pill 90 tablet 3    lancets Misc Check blood sugar daily before meals (goal ) 100 each 3    linaCLOtide (LINZESS) 145 mcg Cap capsule Take 1 capsule (145 mcg total) by mouth before breakfast. constipation 90 capsule 3    liraglutide 0.6 mg/0.1 mL, 18 mg/3 mL, subq PNIJ (VICTOZA 2-TIGRE) 0.6 mg/0.1 mL (18 mg/3 mL) PnIj pen Inject 1.2 mg every day by subcutaneous route.      lisinopriL-hydrochlorothiazide  "(PRINZIDE,ZESTORETIC) 20-25 mg Tab Take 1 tablet by mouth once daily. 90 tablet 3    lisinopriL-hydrochlorothiazide (PRINZIDE,ZESTORETIC) 20-25 mg Tab Take 1 tablet by mouth once daily.      metFORMIN (GLUCOPHAGE) 1000 MG tablet TAKE 1 TABLET BY MOUTH TWICE DAILY WITH MEALS 180 tablet 3    omeprazole (PRILOSEC) 40 MG capsule TAKE 1 CAPSULE BY MOUTH ONCE DAILY ACID  REFLUX 90 capsule 3    pen needle, diabetic (BD ULTRA-FINE SHORT PEN NEEDLE) 31 gauge x 5/16" Ndle USE 1 PEN NEEDLE TO INJECT INSULIN ONCE DAILY. 100 each 0    potassium chloride (KLOR-CON) 8 MEQ TbSR Take 1 tablet by mouth once daily.      pregabalin (LYRICA) 75 MG capsule Take 1 capsule (75 mg total) by mouth 2 (two) times daily. 60 capsule 4    semaglutide (OZEMPIC) 0.25 mg or 0.5 mg (2 mg/3 mL) pen injector Inject 0.5 mg into the skin every 7 days. 3 mL 2     No current facility-administered medications on file prior to visit.     Review of patient's allergies indicates:   Allergen Reactions    Levonorgestrel-ethinyl estrad      Other reaction(s): sinus, running nose, cough, etc.    Morphine      Other reaction(s): Not available       Review of Systems   Constitutional: Negative for chills, decreased appetite, fever, malaise/fatigue, night sweats, weight gain and weight loss.   Cardiovascular:  Negative for chest pain, claudication, dyspnea on exertion, leg swelling, palpitations and syncope.   Respiratory:  Negative for cough and shortness of breath.    Endocrine: Negative for cold intolerance and heat intolerance.   Hematologic/Lymphatic: Negative for bleeding problem. Does not bruise/bleed easily.   Skin:  Negative for color change, dry skin, flushing, itching, nail changes, poor wound healing, rash, skin cancer, suspicious lesions and unusual hair distribution.   Musculoskeletal:  Positive for back pain. Negative for arthritis, falls, gout, joint pain, joint swelling, muscle cramps, muscle weakness, myalgias, neck pain and stiffness. " "  Gastrointestinal:  Negative for diarrhea, nausea and vomiting.   Neurological:  Positive for numbness and paresthesias. Negative for dizziness, focal weakness, light-headedness, tremors, vertigo and weakness.   Psychiatric/Behavioral:  Negative for altered mental status and depression. The patient does not have insomnia.    Allergic/Immunologic: Negative.            Objective:       Vitals:    10/25/23 1318   BP: 125/89   Pulse: 102   Weight: 120.8 kg (266 lb 5.1 oz)   Height: 5' 11" (1.803 m)   PainSc:   4   PainLoc: Toe   120.8 kg (266 lb 5.1 oz)     Physical Exam  Vitals reviewed.   Constitutional:       General: He is not in acute distress.     Appearance: He is well-developed. He is not ill-appearing, toxic-appearing or diaphoretic.      Comments:       Cardiovascular:      Pulses:           Dorsalis pedis pulses are 2+ on the right side and 2+ on the left side.        Posterior tibial pulses are 2+ on the right side and 2+ on the left side.   Musculoskeletal:         General: No swelling.      Right lower leg: No edema.      Left lower leg: No edema.      Right ankle: Normal.      Right Achilles Tendon: Normal.      Left ankle: Normal.      Left Achilles Tendon: Normal.      Right foot: Decreased range of motion. Deformity and prominent metatarsal heads present. No tenderness or bony tenderness.      Left foot: Decreased range of motion. Deformity, prominent metatarsal heads, tenderness and bony tenderness present.      Comments: Positive diffuse pain on palpation to entire left foot with more specifically to distal forefoot 2nd and 3rd digits left       Feet:      Right foot:      Protective Sensation: 10 sites tested.  10 sites sensed.      Skin integrity: Dry skin present. No ulcer, blister, skin breakdown, erythema, warmth or callus.      Toenail Condition: Right toenails are normal.      Left foot:      Protective Sensation: 10 sites tested.  6 sites sensed.      Skin integrity: Dry skin present. No " ulcer, blister, skin breakdown, erythema, warmth or callus.      Toenail Condition: Left toenails are normal.      Comments: Decreased sensation left foot digits as well as hypersensitivity to 2nd digit left  Skin:     General: Skin is warm and dry.      Capillary Refill: Capillary refill takes 2 to 3 seconds.      Coloration: Skin is not pale.      Findings: No erythema or rash.      Nails: There is no clubbing.   Neurological:      Mental Status: He is alert and oriented to person, place, and time.      Sensory: Sensory deficit present.      Gait: Gait abnormal.      Comments: There is pain on palpation of the left 3rd  intermetatarsal space with a positive Nasima's click. Minimal tenderness to palpation of the adjacent metatarsal heads.     Psychiatric:         Attention and Perception: Attention normal.         Mood and Affect: Mood normal.         Speech: Speech normal.         Behavior: Behavior normal.         Thought Content: Thought content normal.         Cognition and Memory: Cognition normal.         Judgment: Judgment normal.               Assessment:       Encounter Diagnoses   Name Primary?    Type 2 diabetes mellitus without complication, without long-term current use of insulin Yes    Foot pain, left     Osteoarthritis of spine with radiculopathy, lumbar region     Neuritis          Plan:       Haseeb was seen today for foot problem.    Diagnoses and all orders for this visit:    Type 2 diabetes mellitus without complication, without long-term current use of insulin    Foot pain, left    Osteoarthritis of spine with radiculopathy, lumbar region    Neuritis    Other orders  -     triamcinolone acetonide injection 40 mg    The nature of the condition, options for management, as well as potential risks and complications were discussed in detail with patient. Patient was amenable to my recommendations and left my office fully informed and will follow up as instructed or sooner if necessary.       Discussed options for peripheral neuropathy/nerve entrapment syndrome including nerve block therapy, surgical nerve entrapment decompression procedures, and various vitamins and supplementation available shown to improve nerve function.    A steroid injection was performed at left 3rd metatarsal space using 1% plain Lidocaine and 1 mL/40 mg of Kenalog. This was well tolerated.    Begin icing/topical anti-inflammatory/orthotic metatarsal pad.  Recommend shoe gear today.  Follow-up in 2 months.

## 2023-11-13 ENCOUNTER — PROCEDURE VISIT (OUTPATIENT)
Dept: PODIATRY | Facility: CLINIC | Age: 53
End: 2023-11-13
Payer: MEDICAID

## 2023-11-13 VITALS
HEIGHT: 71 IN | BODY MASS INDEX: 37.04 KG/M2 | DIASTOLIC BLOOD PRESSURE: 81 MMHG | SYSTOLIC BLOOD PRESSURE: 120 MMHG | HEART RATE: 69 BPM | WEIGHT: 264.56 LBS

## 2023-11-13 DIAGNOSIS — M20.42 HAMMER TOE OF LEFT FOOT: ICD-10-CM

## 2023-11-13 DIAGNOSIS — E11.9 TYPE 2 DIABETES MELLITUS WITHOUT COMPLICATION, WITHOUT LONG-TERM CURRENT USE OF INSULIN: Primary | ICD-10-CM

## 2023-11-13 PROCEDURE — 28232 INCISION OF TOE TENDON: CPT | Mod: T1,PBBFAC | Performed by: PODIATRIST

## 2023-11-13 PROCEDURE — 28232 PR INCISION FLEX TOE TENDON: ICD-10-PCS | Mod: T1,S$PBB,, | Performed by: PODIATRIST

## 2023-11-13 PROCEDURE — 28232 INCISION OF TOE TENDON: CPT | Mod: T1,S$PBB,, | Performed by: PODIATRIST

## 2023-11-13 RX ORDER — SULFAMETHOXAZOLE AND TRIMETHOPRIM 400; 80 MG/1; MG/1
1 TABLET ORAL 2 TIMES DAILY
Qty: 10 TABLET | Refills: 0 | Status: SHIPPED | OUTPATIENT
Start: 2023-11-13 | End: 2024-03-07

## 2023-11-13 RX ORDER — SUCRALFATE 1 G/1
TABLET ORAL
COMMUNITY

## 2023-11-13 RX ORDER — OXYCODONE AND ACETAMINOPHEN 5; 325 MG/1; MG/1
1 TABLET ORAL EVERY 8 HOURS PRN
Qty: 15 TABLET | Refills: 0 | Status: SHIPPED | OUTPATIENT
Start: 2023-11-13

## 2023-11-15 NOTE — PROCEDURES
Procedure:  Flexor tenotomy left 2nd digit  Preop/postop diagnosis:  Flexible hammertoe deformity left 2nd digit  Surgeon: Manuel Chance DPM      Attention was directed to the digit where 4cc of 1% lidocaine plain was injected in a digital block fashion, the foot was then prepped and draped using asceptic technique, anesthesia was confirmed, a #15 blade was inserted into the central/plantar aspect of the joint contracture and the flexor tendon was released, the digit was straightened manually and reduction was deemed adequate, tincture of benzoin and steri-strips were applied to splint the digit in a corrected position and closed with 4-0 nylon suture and dry sterile dressing applied to the foot. Capillary fill time was less than 3 seconds.    Postop instructions discussed.  Follow-up in 1 week.

## 2023-11-21 ENCOUNTER — OFFICE VISIT (OUTPATIENT)
Dept: PODIATRY | Facility: CLINIC | Age: 53
End: 2023-11-21
Payer: MEDICAID

## 2023-11-21 VITALS
HEIGHT: 71 IN | WEIGHT: 264.56 LBS | DIASTOLIC BLOOD PRESSURE: 80 MMHG | BODY MASS INDEX: 37.04 KG/M2 | SYSTOLIC BLOOD PRESSURE: 116 MMHG | HEART RATE: 90 BPM

## 2023-11-21 DIAGNOSIS — E11.9 TYPE 2 DIABETES MELLITUS WITHOUT COMPLICATION, WITHOUT LONG-TERM CURRENT USE OF INSULIN: Primary | ICD-10-CM

## 2023-11-21 DIAGNOSIS — M20.42 HAMMER TOE OF LEFT FOOT: ICD-10-CM

## 2023-11-21 PROCEDURE — 3079F PR MOST RECENT DIASTOLIC BLOOD PRESSURE 80-89 MM HG: ICD-10-PCS | Mod: CPTII,,, | Performed by: PODIATRIST

## 2023-11-21 PROCEDURE — 99024 PR POST-OP FOLLOW-UP VISIT: ICD-10-PCS | Mod: ,,, | Performed by: PODIATRIST

## 2023-11-21 PROCEDURE — 3074F PR MOST RECENT SYSTOLIC BLOOD PRESSURE < 130 MM HG: ICD-10-PCS | Mod: CPTII,,, | Performed by: PODIATRIST

## 2023-11-21 PROCEDURE — 99999 PR PBB SHADOW E&M-EST. PATIENT-LVL II: ICD-10-PCS | Mod: PBBFAC,,, | Performed by: PODIATRIST

## 2023-11-21 PROCEDURE — 3044F PR MOST RECENT HEMOGLOBIN A1C LEVEL <7.0%: ICD-10-PCS | Mod: CPTII,,, | Performed by: PODIATRIST

## 2023-11-21 PROCEDURE — 3044F HG A1C LEVEL LT 7.0%: CPT | Mod: CPTII,,, | Performed by: PODIATRIST

## 2023-11-21 PROCEDURE — 3074F SYST BP LT 130 MM HG: CPT | Mod: CPTII,,, | Performed by: PODIATRIST

## 2023-11-21 PROCEDURE — 1159F PR MEDICATION LIST DOCUMENTED IN MEDICAL RECORD: ICD-10-PCS | Mod: CPTII,,, | Performed by: PODIATRIST

## 2023-11-21 PROCEDURE — 4010F ACE/ARB THERAPY RXD/TAKEN: CPT | Mod: CPTII,,, | Performed by: PODIATRIST

## 2023-11-21 PROCEDURE — 1159F MED LIST DOCD IN RCRD: CPT | Mod: CPTII,,, | Performed by: PODIATRIST

## 2023-11-21 PROCEDURE — 99024 POSTOP FOLLOW-UP VISIT: CPT | Mod: ,,, | Performed by: PODIATRIST

## 2023-11-21 PROCEDURE — 4010F PR ACE/ARB THEARPY RXD/TAKEN: ICD-10-PCS | Mod: CPTII,,, | Performed by: PODIATRIST

## 2023-11-21 PROCEDURE — 99212 OFFICE O/P EST SF 10 MIN: CPT | Mod: PBBFAC | Performed by: PODIATRIST

## 2023-11-21 PROCEDURE — 3079F DIAST BP 80-89 MM HG: CPT | Mod: CPTII,,, | Performed by: PODIATRIST

## 2023-11-21 PROCEDURE — 99999 PR PBB SHADOW E&M-EST. PATIENT-LVL II: CPT | Mod: PBBFAC,,, | Performed by: PODIATRIST

## 2023-11-24 NOTE — PROGRESS NOTES
Patient presents 1 week status post flexor tenotomy left 2nd digit.  He is doing very well.  He is already noticing less pain to the tip of the digit.  Digit appears to be in a much more rectus/anatomic alignment.  Incision well healed.  No signs of infection.  Neurovascular status intact.  Follow-up as needed.

## 2023-12-08 DIAGNOSIS — E11.9 TYPE 2 DIABETES MELLITUS WITHOUT COMPLICATION: ICD-10-CM

## 2023-12-12 DIAGNOSIS — M54.41 CHRONIC RIGHT-SIDED LOW BACK PAIN WITH RIGHT-SIDED SCIATICA: ICD-10-CM

## 2023-12-12 DIAGNOSIS — G89.29 CHRONIC RIGHT-SIDED LOW BACK PAIN WITH RIGHT-SIDED SCIATICA: ICD-10-CM

## 2023-12-12 NOTE — TELEPHONE ENCOUNTER
Care Due:                  Date            Visit Type   Department     Provider  --------------------------------------------------------------------------------                                EP -                              PRIMARY      [facility] PRIMARY  Last Visit: 09-      CARE ([facility])   [facility]  Next Visit: None Scheduled  None         None Found                                                            Last  Test          Frequency    Reason                     Performed    Due Date  --------------------------------------------------------------------------------    Lipid Panel.  12 months..  atorvastatin.............  02- 02-    [facility] Embedded Care Due Messages. Reference number: [id number].   12/12/2023 8:57:48 AM CST

## 2023-12-23 DIAGNOSIS — G62.9 POLYNEUROPATHY: ICD-10-CM

## 2023-12-23 NOTE — TELEPHONE ENCOUNTER
Care Due:                  Date            Visit Type   Department     Provider  --------------------------------------------------------------------------------                                EP -                              PRIMARY      [facility] PRIMARY  Last Visit: 09-      CARE ([facility])   CARE           T[facility] HICKMAN  Next Visit: None Scheduled  None         None Found                                                            Last  Test          Frequency    Reason                     Performed    Due Date  --------------------------------------------------------------------------------    HBA1C.......  6 months...  empagliflozin, metFORMIN,   09- 03-                             semaglutide..............    [employer] Embedded Care Due Messages. Reference number: [id number].   12/23/2023 8:49:01 AM CST

## 2023-12-27 DIAGNOSIS — G89.29 CHRONIC RIGHT-SIDED LOW BACK PAIN WITH RIGHT-SIDED SCIATICA: ICD-10-CM

## 2023-12-27 DIAGNOSIS — G62.9 POLYNEUROPATHY: ICD-10-CM

## 2023-12-27 DIAGNOSIS — M54.41 CHRONIC RIGHT-SIDED LOW BACK PAIN WITH RIGHT-SIDED SCIATICA: ICD-10-CM

## 2023-12-27 RX ORDER — AMITRIPTYLINE HYDROCHLORIDE 25 MG/1
TABLET, FILM COATED ORAL
Qty: 90 TABLET | Refills: 3 | Status: SHIPPED | OUTPATIENT
Start: 2023-12-27

## 2023-12-27 RX ORDER — CYCLOBENZAPRINE HCL 10 MG
10 TABLET ORAL 3 TIMES DAILY
Qty: 90 TABLET | Refills: 6 | Status: SHIPPED | OUTPATIENT
Start: 2023-12-27

## 2023-12-27 RX ORDER — PREGABALIN 75 MG/1
75 CAPSULE ORAL 2 TIMES DAILY
Qty: 60 CAPSULE | Refills: 0 | OUTPATIENT
Start: 2023-12-27

## 2023-12-27 RX ORDER — CYCLOBENZAPRINE HCL 10 MG
10 TABLET ORAL 3 TIMES DAILY
Qty: 90 TABLET | Refills: 0 | OUTPATIENT
Start: 2023-12-27

## 2023-12-27 RX ORDER — PREGABALIN 75 MG/1
75 CAPSULE ORAL 2 TIMES DAILY
Qty: 60 CAPSULE | Refills: 4 | Status: SHIPPED | OUTPATIENT
Start: 2023-12-27 | End: 2024-06-26

## 2024-01-29 ENCOUNTER — TELEPHONE (OUTPATIENT)
Dept: PRIMARY CARE CLINIC | Facility: CLINIC | Age: 54
End: 2024-01-29
Payer: MEDICAID

## 2024-01-29 NOTE — TELEPHONE ENCOUNTER
----- Message from Rosalie Beltran sent at 1/29/2024  3:48 PM CST -----  Contact: Amol [phone]  Patient would like to get medical advice.  Symptoms (please be specific):   [facility] is requesting the most recent clinic notes for the pt not older than 6 months to go with the signed order faxed to  or . [facility] states they did receive the signed order.   How long have you had these symptoms: N/A  Would you like a call back, or a response through your [facility] portal?:     Pharmacy name and phone # (copy from chart):   N/A  Comments:

## 2024-01-29 NOTE — TELEPHONE ENCOUNTER
----- Message from Kim España sent at 1/29/2024  9:44 AM CST -----  Contact: stacy de jesus [phone]  Calling to check the status of a fax about a back brace that was faxed on jan 17th. Will refax the request.    Please Call and advise    Thank you    Please do NOT rep[ly to sender as this is from the call center and they answer incoming calls only.

## 2024-01-31 ENCOUNTER — TELEPHONE (OUTPATIENT)
Dept: PRIMARY CARE CLINIC | Facility: CLINIC | Age: 54
End: 2024-01-31
Payer: MEDICAID

## 2024-01-31 NOTE — TELEPHONE ENCOUNTER
----- Message from Kim España sent at 1/30/2024  3:49 PM CST -----  Contact: Amarilis / jocelin [phone]  Stating that more information is needed on the patient shoulder ( wasn't clear about which shoulder).  A physical note is needed regarding a physical examination or x-ray of the patient shoulder that is not older than 6 months.    Comments: Company is available from 9am- 5pm ET time.    Please Call and advise    Thank you    Please do NOT rep[ly to sender as this is from the call center and they answer incoming calls only.

## 2024-01-31 NOTE — TELEPHONE ENCOUNTER
Spoke with [employer]. I told her the orders Dr. Patino signed was for a lumbar brace. Pt last seen in September 23. He can schedule an appt with Dr. Patino to discuss his shoulder and he has no imaging on file.

## 2024-02-05 ENCOUNTER — TELEPHONE (OUTPATIENT)
Dept: PRIMARY CARE CLINIC | Facility: CLINIC | Age: 54
End: 2024-02-05
Payer: MEDICAID

## 2024-02-05 NOTE — TELEPHONE ENCOUNTER
----- Message from Aislinn CHARLES Reilly sent at 2/5/2024  3:01 PM CST -----  Contact: Ms. Olmos Phone# [phone], fax# [phone]  Ms. Olmos from [facility] said that she need more information about the patients shoulder pain with physical or X-ray of the shoulder or neck.

## 2024-02-05 NOTE — TELEPHONE ENCOUNTER
Patient has  follow up appt with Dr. Patino on Thursday at 8:40 to discuss his shoulder pain and an annual exam.

## 2024-02-08 ENCOUNTER — OFFICE VISIT (OUTPATIENT)
Dept: PRIMARY CARE CLINIC | Facility: CLINIC | Age: 54
End: 2024-02-08
Payer: MEDICAID

## 2024-02-08 VITALS
HEART RATE: 80 BPM | BODY MASS INDEX: 37.97 KG/M2 | WEIGHT: 272.25 LBS | DIASTOLIC BLOOD PRESSURE: 85 MMHG | SYSTOLIC BLOOD PRESSURE: 125 MMHG | OXYGEN SATURATION: 96 %

## 2024-02-08 DIAGNOSIS — K59.04 CHRONIC IDIOPATHIC CONSTIPATION: ICD-10-CM

## 2024-02-08 DIAGNOSIS — M54.41 CHRONIC RIGHT-SIDED LOW BACK PAIN WITH RIGHT-SIDED SCIATICA: ICD-10-CM

## 2024-02-08 DIAGNOSIS — G89.29 CHRONIC RIGHT SHOULDER PAIN: ICD-10-CM

## 2024-02-08 DIAGNOSIS — K59.00 CONSTIPATION, UNSPECIFIED CONSTIPATION TYPE: Primary | ICD-10-CM

## 2024-02-08 DIAGNOSIS — Z85.040 HISTORY OF MALIGNANT CARCINOID TUMOR OF RECTUM: ICD-10-CM

## 2024-02-08 DIAGNOSIS — E11.9 TYPE 2 DIABETES MELLITUS WITHOUT COMPLICATION, WITHOUT LONG-TERM CURRENT USE OF INSULIN: ICD-10-CM

## 2024-02-08 DIAGNOSIS — E66.9 OBESITY, UNSPECIFIED CLASSIFICATION, UNSPECIFIED OBESITY TYPE, UNSPECIFIED WHETHER SERIOUS COMORBIDITY PRESENT: ICD-10-CM

## 2024-02-08 DIAGNOSIS — G89.29 CHRONIC RIGHT-SIDED LOW BACK PAIN WITH RIGHT-SIDED SCIATICA: ICD-10-CM

## 2024-02-08 DIAGNOSIS — M25.511 CHRONIC RIGHT SHOULDER PAIN: ICD-10-CM

## 2024-02-08 PROCEDURE — 99999 PR PBB SHADOW E&M-EST. PATIENT-LVL IV: CPT | Mod: PBBFAC,,, | Performed by: FAMILY MEDICINE

## 2024-02-08 PROCEDURE — 4010F ACE/ARB THERAPY RXD/TAKEN: CPT | Mod: CPTII,,, | Performed by: FAMILY MEDICINE

## 2024-02-08 PROCEDURE — 3079F DIAST BP 80-89 MM HG: CPT | Mod: CPTII,,, | Performed by: FAMILY MEDICINE

## 2024-02-08 PROCEDURE — 3008F BODY MASS INDEX DOCD: CPT | Mod: CPTII,,, | Performed by: FAMILY MEDICINE

## 2024-02-08 PROCEDURE — 99214 OFFICE O/P EST MOD 30 MIN: CPT | Mod: S$PBB,,, | Performed by: FAMILY MEDICINE

## 2024-02-08 PROCEDURE — 99214 OFFICE O/P EST MOD 30 MIN: CPT | Mod: PBBFAC,PN | Performed by: FAMILY MEDICINE

## 2024-02-08 PROCEDURE — 1159F MED LIST DOCD IN RCRD: CPT | Mod: CPTII,,, | Performed by: FAMILY MEDICINE

## 2024-02-08 PROCEDURE — 3074F SYST BP LT 130 MM HG: CPT | Mod: CPTII,,, | Performed by: FAMILY MEDICINE

## 2024-02-08 RX ORDER — SEMAGLUTIDE 1.34 MG/ML
1 INJECTION, SOLUTION SUBCUTANEOUS
Qty: 3 ML | Refills: 11 | Status: SHIPPED | OUTPATIENT
Start: 2024-02-08 | End: 2024-02-23 | Stop reason: SDUPTHER

## 2024-02-08 NOTE — PROGRESS NOTES
Clinic Note  2/8/2024      Subjective:       Patient ID:  Haseeb is a 53 y.o. male being seen for an established visit.    Chief Complaint: Follow-up, Shoulder Injury, and Low-back Pain    Chronic lower back pain-being followed by pain management for this, recently had nerve ablation?  Will be getting a steroid injection of the back soon.  Requesting a back brace    Right shoulder pain-patient reports that whenever he sleeps at night and wakes up in the morning, reports numbness and tingling of the bilateral upper extremities.  Also has pain on his shoulder when he sleeps on his side.  Reports having right shoulder pain worse than left.    Constipation-patient alternates between constipation and diarrhea.  Currently taking Linzess 145 mcg daily.        Review of Systems   Constitutional:  Negative for chills, fever, malaise/fatigue and weight loss.   HENT:  Negative for congestion, sinus pain and sore throat.    Respiratory:  Negative for cough, shortness of breath and wheezing.    Cardiovascular:  Negative for chest pain and palpitations.   Gastrointestinal:  Negative for constipation, diarrhea, nausea and vomiting.   Genitourinary:  Negative for dysuria, frequency and urgency.   Musculoskeletal:  Positive for back pain and joint pain. Negative for myalgias.   Skin:  Negative for rash.   Neurological:  Negative for headaches.       Medication List with Changes/Refills   New Medications    LINACLOTIDE (LINZESS) 290 MCG CAP CAPSULE    Take 1 capsule (290 mcg total) by mouth before breakfast. constipation    SEMAGLUTIDE (OZEMPIC) 1 MG/DOSE (4 MG/3 ML)    Inject 1 mg into the skin every 7 days.   Current Medications    AMITRIPTYLINE (ELAVIL) 25 MG TABLET    TAKE 1 TABLET nightly for back pain and sleep    ATORVASTATIN (LIPITOR) 40 MG TABLET    TAKE 1 TABLET BY MOUTH IN THE EVENING FOR HIGH CHOLESTEROL    BLOOD SUGAR DIAGNOSTIC STRP    Check blood sugar daily before meals (goal )    BLOOD-GLUCOSE METER KIT     "Check blood sugar 3 times daily before meals (goal )    CLONIDINE (CATAPRES) 0.2 MG TABLET    Take 1 tablet by mouth once daily.    CYCLOBENZAPRINE (FLEXERIL) 10 MG TABLET    Take 1 tablet (10 mg total) by mouth 3 (three) times daily.    DICYCLOMINE (BENTYL) 20 MG TABLET    Take 1 tablet (20 mg total) by mouth 3 (three) times daily as needed (abdominal pain).    DILTIAZEM (DILACOR XR) 120 MG CDCR    TAKE 1 CAPSULE BY MOUTH ONCE DAILY FOR HIGH BLOOD PRESSURE    DULOXETINE (CYMBALTA) 60 MG CAPSULE    Take 1 capsule (60 mg total) by mouth once daily.    EMPAGLIFLOZIN (JARDIANCE) 10 MG TABLET    TAKE 1 TABLET BY MOUTH ONCE DAILY FOR DIABETES    ERGOCALCIFEROL (ERGOCALCIFEROL) 50,000 UNIT CAP    Take 1 capsule by mouth every 7 days.    FAMOTIDINE (PEPCID) 40 MG TABLET    TAKE 1 TABLET BY MOUTH NIGHTLY AS NEEDED FOR HEARTBURN    FUROSEMIDE (LASIX) 40 MG TABLET    Take 1 tablet (40 mg total) by mouth once daily. Water pill    LANCETS MISC    Check blood sugar daily before meals (goal )    LISINOPRIL-HYDROCHLOROTHIAZIDE (PRINZIDE,ZESTORETIC) 20-25 MG TAB    Take 1 tablet by mouth once daily.    LISINOPRIL-HYDROCHLOROTHIAZIDE (PRINZIDE,ZESTORETIC) 20-25 MG TAB    Take 1 tablet by mouth once daily.    METFORMIN (GLUCOPHAGE) 1000 MG TABLET    TAKE 1 TABLET BY MOUTH TWICE DAILY WITH MEALS    OMEPRAZOLE (PRILOSEC) 40 MG CAPSULE    TAKE 1 CAPSULE BY MOUTH ONCE DAILY ACID  REFLUX    OXYCODONE-ACETAMINOPHEN (PERCOCET) 5-325 MG PER TABLET    Take 1 tablet by mouth every 8 (eight) hours as needed for Pain.    PEN NEEDLE, DIABETIC (BD ULTRA-FINE SHORT PEN NEEDLE) 31 GAUGE X 5/16" NDLE    USE 1 PEN NEEDLE TO INJECT INSULIN ONCE DAILY.    POTASSIUM CHLORIDE (KLOR-CON) 8 MEQ TBSR    Take 1 tablet by mouth once daily.    PREGABALIN (LYRICA) 75 MG CAPSULE    Take 1 capsule (75 mg total) by mouth 2 (two) times daily.    SUCRALFATE (CARAFATE) 1 GRAM TABLET    TAKE 1 TABLET BY MOUTH 4 TIMES DAILY BEFORE MEAL(S) AND NIGHTLY (LINES " "THE STOMACH)    SULFAMETHOXAZOLE-TRIMETHOPRIM 400-80MG (BACTRIM,SEPTRA) 400-80 MG PER TABLET    Take 1 tablet by mouth 2 (two) times daily.   Discontinued Medications    DULAGLUTIDE (TRULICITY) 3 MG/0.5 ML PEN INJECTOR    INJECT 3MG INTO THE SKIN EVERY 7 DAYS    LINACLOTIDE (LINZESS) 145 MCG CAP CAPSULE    Take 1 capsule (145 mcg total) by mouth before breakfast. constipation    LIRAGLUTIDE 0.6 MG/0.1 ML, 18 MG/3 ML, SUBQ PNIJ (VICTOZA 2-TIGRE) 0.6 MG/0.1 ML (18 MG/3 ML) PNIJ PEN    Inject 1.2 mg every day by subcutaneous route.    SEMAGLUTIDE (OZEMPIC) 0.25 MG OR 0.5 MG (2 MG/3 ML) PEN INJECTOR    Inject 0.5 mg into the skin every 7 days.       Patient Active Problem List   Diagnosis    Chronic right-sided low back pain with right-sided sciatica    Type 2 diabetes mellitus without complication, without long-term current use of insulin    Constipation    Essential hypertension    Hyperlipidemia    Osteoarthritis of spine with radiculopathy, lumbar region    Polyneuropathy    Decreased ROM of right shoulder    Decreased strength of upper extremity    Poor posture    Erectile dysfunction    Vitamin B12 deficiency    Class 2 severe obesity due to excess calories with serious comorbidity and body mass index (BMI) of 38.0 to 38.9 in adult    History of malignant carcinoid tumor of rectum           Objective:      /85 (BP Location: Right arm, Patient Position: Sitting, BP Method: Large (Automatic))   Pulse 80   Wt 123.5 kg (272 lb 4.3 oz)   SpO2 96%   BMI 37.97 kg/m²   Estimated body mass index is 37.97 kg/m² as calculated from the following:    Height as of 11/21/23: 5' 11" (1.803 m).    Weight as of this encounter: 123.5 kg (272 lb 4.3 oz).  Physical Exam  Vitals reviewed.   Constitutional:       General: He is not in acute distress.     Appearance: He is obese. He is not diaphoretic.   HENT:      Head: Normocephalic and atraumatic.   Eyes:      Conjunctiva/sclera: Conjunctivae normal.   Cardiovascular:      " Rate and Rhythm: Normal rate and regular rhythm.      Heart sounds: Normal heart sounds.   Pulmonary:      Effort: Pulmonary effort is normal. No respiratory distress.      Breath sounds: Normal breath sounds. No wheezing.   Abdominal:      General: Bowel sounds are normal.      Palpations: Abdomen is soft.   Musculoskeletal:         General: Normal range of motion.        Arms:       Cervical back: Normal range of motion.   Skin:     General: Skin is warm and dry.      Findings: No erythema or rash.   Neurological:      Mental Status: He is alert and oriented to person, place, and time.   Psychiatric:         Mood and Affect: Mood and affect normal.         Behavior: Behavior normal.         Thought Content: Thought content normal.         Judgment: Judgment normal.           Assessment and Plan:     1. Constipation, unspecified constipation type  - will do a trial of increasing to 290 mcg to see this helps with the constipation  - linaCLOtide (LINZESS) 290 mcg Cap capsule; Take 1 capsule (290 mcg total) by mouth before breakfast. constipation  Dispense: 30 capsule; Refill: 6    2. Chronic idiopathic constipation  - linaCLOtide (LINZESS) 290 mcg Cap capsule; Take 1 capsule (290 mcg total) by mouth before breakfast. constipation  Dispense: 30 capsule; Refill: 6    3. Chronic right shoulder pain  - appears to me muscular in nature, muscular tenderness and tightness on exam.  Refer to physical therapy for dry needling as this has helped patient in the past  - Ambulatory referral/consult to Physical/Occupational Therapy; Future    4. Type 2 diabetes mellitus without complication, without long-term current use of insulin  - increase to Ozempic 1 mg, diabetes well-controlled  - semaglutide (OZEMPIC) 1 mg/dose (4 mg/3 mL); Inject 1 mg into the skin every 7 days.  Dispense: 3 mL; Refill: 11  - Hemoglobin A1C; Future    5. Chronic right-sided low back pain with right-sided sciatica  - continue follow-up with pain management  and patient requesting back brace  - Back/Cervical Brace For Home Use    6. History of malignant carcinoid tumor of rectum  - repeat colonoscopy in 2027    7. Obesity, unspecified classification, unspecified obesity type, unspecified whether serious comorbidity present  - discussed extensively about low-carbohydrate diet, intake 25-50 g of carbs per day        Follow Up:   No follow-ups on file.    Other Orders Placed This Visit:  Orders Placed This Encounter   Procedures    Back/Cervical Brace For Home Use    Hemoglobin A1C    Ambulatory referral/consult to Physical/Occupational Therapy         Aman Patino MD        This note is dictated on M*Modal word recognition program.  There are word recognition mistakes that are occasionally missed on review.

## 2024-02-15 DIAGNOSIS — E11.9 TYPE 2 DIABETES MELLITUS WITHOUT COMPLICATION: ICD-10-CM

## 2024-02-21 ENCOUNTER — TELEPHONE (OUTPATIENT)
Dept: PRIMARY CARE CLINIC | Facility: CLINIC | Age: 54
End: 2024-02-21
Payer: MEDICAID

## 2024-02-21 NOTE — TELEPHONE ENCOUNTER
----- Message from Bri Fragoso sent at 2/21/2024  2:02 PM CST -----  Contact: [phone]  Germa with [facility] is calling she states they are needing to request the clinical notes for the appt on 02/08 for the shoulder pain     Fax [phone]

## 2024-02-23 DIAGNOSIS — E11.9 TYPE 2 DIABETES MELLITUS WITHOUT COMPLICATION, WITHOUT LONG-TERM CURRENT USE OF INSULIN: ICD-10-CM

## 2024-02-23 NOTE — TELEPHONE ENCOUNTER
Care Due:                  Date            Visit Type   Department     Provider  --------------------------------------------------------------------------------                                EP -                              PRIMARY      [facility] PRIMARY  Last Visit: 02-      CARE ([facility])   [facility]  Next Visit: None Scheduled  None         None Found                                                            Last  Test          Frequency    Reason                     Performed    Due Date  --------------------------------------------------------------------------------    Lipid Panel.  12 months..  atorvastatin.............  02- 02-    [facility] Embedded Care Due Messages. Reference number: [id number].   2/23/2024 12:15:06 PM CST

## 2024-02-23 NOTE — TELEPHONE ENCOUNTER
----- Message from Kim España sent at 2/23/2024 11:04 AM CST -----  Contact: Pt [phone]  Requesting an RX refill or new RX.  Is this a refill or new RX:  Refill  RX name and strength (copy/paste from chart):  semaglutide (OZEMPIC) 1 mg/dose (4 mg/3 mL)  Is this a 30 day or 90 day RX: 3 mL  Pharmacy name and phone # (copy/paste from chart):    [facility] [address] [place], [place] - [address]  [address] [facility] [address]  Phone: [phone] Fax: [phone]    Comment: Pt is asking for another refill, because his cousin who he says is on crack took his medication while he was in the process of moving. He says he really needs his medication and asking for another refill & hoping he's  insurance will cover it for him.      Please Call and advise    Thank you    Please do NOT rep[ly to sender as this is from the call center and they answer incoming calls only.

## 2024-02-26 RX ORDER — SEMAGLUTIDE 1.34 MG/ML
1 INJECTION, SOLUTION SUBCUTANEOUS
Qty: 3 ML | Refills: 0 | Status: SHIPPED | OUTPATIENT
Start: 2024-02-26 | End: 2024-06-17 | Stop reason: SDUPTHER

## 2024-02-28 ENCOUNTER — HOSPITAL ENCOUNTER (EMERGENCY)
Facility: OTHER | Age: 54
Discharge: HOME | End: 2024-02-28
Attending: EMERGENCY MEDICINE
Payer: MEDICAID

## 2024-02-28 VITALS
WEIGHT: 269 LBS | BODY MASS INDEX: 37.52 KG/M2 | TEMPERATURE: 98 F | SYSTOLIC BLOOD PRESSURE: 137 MMHG | OXYGEN SATURATION: 97 % | HEART RATE: 90 BPM | RESPIRATION RATE: 14 BRPM | DIASTOLIC BLOOD PRESSURE: 85 MMHG

## 2024-02-28 DIAGNOSIS — H81.10 BENIGN PAROXYSMAL POSITIONAL VERTIGO, UNSPECIFIED LATERALITY: Primary | ICD-10-CM

## 2024-02-28 LAB
ANION GAP SERPL CALC-SCNC: 11 MMOL/L (ref 8–16)
BASOPHILS # BLD AUTO: 0.05 K/UL (ref 0–0.2)
BASOPHILS NFR BLD: 0.8 % (ref 0–1.9)
BUN SERPL-MCNC: 19 MG/DL (ref 6–20)
CALCIUM SERPL-MCNC: 9.5 MG/DL (ref 8.7–10.5)
CHLORIDE SERPL-SCNC: 102 MMOL/L (ref 95–110)
CO2 SERPL-SCNC: 25 MMOL/L (ref 23–29)
CREAT SERPL-MCNC: 1 MG/DL (ref 0.5–1.4)
DIFFERENTIAL METHOD BLD: ABNORMAL
EOSINOPHIL # BLD AUTO: 0.1 K/UL (ref 0–0.5)
EOSINOPHIL NFR BLD: 1.9 % (ref 0–8)
ERYTHROCYTE [DISTWIDTH] IN BLOOD BY AUTOMATED COUNT: 13.2 % (ref 11.5–14.5)
EST. GFR  (NO RACE VARIABLE): >60 ML/MIN/1.73 M^2
GLUCOSE SERPL-MCNC: 105 MG/DL (ref 70–110)
HCT VFR BLD AUTO: 42.6 % (ref 40–54)
HGB BLD-MCNC: 14.5 G/DL (ref 14–18)
IMM GRANULOCYTES # BLD AUTO: 0 K/UL (ref 0–0.04)
IMM GRANULOCYTES NFR BLD AUTO: 0 % (ref 0–0.5)
LYMPHOCYTES # BLD AUTO: 3.1 K/UL (ref 1–4.8)
LYMPHOCYTES NFR BLD: 48.4 % (ref 18–48)
MCH RBC QN AUTO: 30 PG (ref 27–31)
MCHC RBC AUTO-ENTMCNC: 34 G/DL (ref 32–36)
MCV RBC AUTO: 88 FL (ref 82–98)
MONOCYTES # BLD AUTO: 0.6 K/UL (ref 0.3–1)
MONOCYTES NFR BLD: 9.4 % (ref 4–15)
NEUTROPHILS # BLD AUTO: 2.5 K/UL (ref 1.8–7.7)
NEUTROPHILS NFR BLD: 39.5 % (ref 38–73)
NRBC BLD-RTO: 0 /100 WBC
PLATELET # BLD AUTO: 408 K/UL (ref 150–450)
PMV BLD AUTO: 8.9 FL (ref 9.2–12.9)
POCT GLUCOSE: 95 MG/DL (ref 70–110)
POTASSIUM SERPL-SCNC: 3.5 MMOL/L (ref 3.5–5.1)
RBC # BLD AUTO: 4.83 M/UL (ref 4.6–6.2)
SODIUM SERPL-SCNC: 138 MMOL/L (ref 136–145)
WBC # BLD AUTO: 6.37 K/UL (ref 3.9–12.7)

## 2024-02-28 PROCEDURE — 85025 COMPLETE CBC W/AUTO DIFF WBC: CPT | Performed by: EMERGENCY MEDICINE

## 2024-02-28 PROCEDURE — 99283 EMERGENCY DEPT VISIT LOW MDM: CPT

## 2024-02-28 PROCEDURE — 25000003 PHARM REV CODE 250: Performed by: EMERGENCY MEDICINE

## 2024-02-28 PROCEDURE — 82962 GLUCOSE BLOOD TEST: CPT

## 2024-02-28 PROCEDURE — 80048 BASIC METABOLIC PNL TOTAL CA: CPT | Performed by: EMERGENCY MEDICINE

## 2024-02-28 RX ORDER — MECLIZINE HYDROCHLORIDE 25 MG/1
25 TABLET ORAL 3 TIMES DAILY PRN
Qty: 20 TABLET | Refills: 0 | Status: SHIPPED | OUTPATIENT
Start: 2024-02-28 | End: 2024-03-07 | Stop reason: SDUPTHER

## 2024-02-28 RX ORDER — MECLIZINE HYDROCHLORIDE 25 MG/1
25 TABLET ORAL
Status: COMPLETED | OUTPATIENT
Start: 2024-02-28 | End: 2024-02-28

## 2024-02-28 RX ADMIN — MECLIZINE HYDROCHLORIDE 25 MG: 25 TABLET ORAL at 06:02

## 2024-02-29 ENCOUNTER — CLINICAL SUPPORT (OUTPATIENT)
Dept: REHABILITATION | Facility: HOSPITAL | Age: 54
End: 2024-02-29
Payer: MEDICAID

## 2024-02-29 DIAGNOSIS — M25.511 CHRONIC RIGHT SHOULDER PAIN: ICD-10-CM

## 2024-02-29 DIAGNOSIS — G89.29 CHRONIC RIGHT SHOULDER PAIN: ICD-10-CM

## 2024-02-29 PROCEDURE — 97165 OT EVAL LOW COMPLEX 30 MIN: CPT | Mod: PO

## 2024-02-29 NOTE — ED PROVIDER NOTES
Chief complaint:  Dizziness (Reports dizziness, blurred vision, unsteady balance, nausea, and abd pain, onset 4 days ago, hx of DM.)      HPI:  Haseeb Reilly is a 53 y.o. male with hx htn, hyperlipidemia, DM, obesity, chronic lower back pain presenting with intermittent sensation of vertigo marked by the room spinning in a rightward direction triggered by certain head movements to clarify triage note.  Also to clarify triage note, he denies any visual change with his glasses.  When this sensation strikes he does feel unsteady, but it lasts seconds to a minute and then resolves.  It is accompanied by nausea without vomiting.  It has been occurring episodically for the last 4 days.  He denies change in medications.  He denies headache, diplopia, difficulty swallowing, difficulty speaking, focal numbness or weakness.  He is able to walk normally as long as a particular episode has not been triggered by head movement.  Particular instances include when he was at his car and left upward towards the akshat or when he rolled over in bed.  In regard to abdominal pain mentioned this is chronic and not currently present.  He is on Linzess as needed for this and last experienced in the morning with no present pain.  This is often generalized when present and once again absent at present.    ROS: As per HPI and below:  No swelling, oliguria, dysuria, hematuria, blood in the stools, fever, chest pain, syncope or presyncope.    Review of patient's allergies indicates:   Allergen Reactions    Levonorgestrel-ethinyl estrad      Other reaction(s): sinus, running nose, cough, etc.    Morphine      Other reaction(s): Not available       Discharge Medication List as of 2/28/2024  7:36 PM        START taking these medications    Details   meclizine (ANTIVERT) 25 mg tablet Take 1 tablet (25 mg total) by mouth 3 (three) times daily as needed for Dizziness., Starting Wed 2/28/2024, Until Tue 3/5/2024 at 2359, Normal           CONTINUE  these medications which have NOT CHANGED    Details   amitriptyline (ELAVIL) 25 MG tablet TAKE 1 TABLET nightly for back pain and sleep, Normal      atorvastatin (LIPITOR) 40 MG tablet TAKE 1 TABLET BY MOUTH IN THE EVENING FOR HIGH CHOLESTEROL, Normal      blood sugar diagnostic Strp Check blood sugar daily before meals (goal ), Normal      blood-glucose meter kit Check blood sugar 3 times daily before meals (goal ), Normal      cloNIDine (CATAPRES) 0.2 MG tablet Take 1 tablet by mouth once daily., Historical Med      cyclobenzaprine (FLEXERIL) 10 MG tablet Take 1 tablet (10 mg total) by mouth 3 (three) times daily., Starting Wed 12/27/2023, Normal      dicyclomine (BENTYL) 20 mg tablet Take 1 tablet (20 mg total) by mouth 3 (three) times daily as needed (abdominal pain)., Starting Thu 6/22/2023, Normal      diltiaZEM (DILACOR XR) 120 MG CDCR TAKE 1 CAPSULE BY MOUTH ONCE DAILY FOR HIGH BLOOD PRESSURE, Normal      DULoxetine (CYMBALTA) 60 MG capsule Take 1 capsule (60 mg total) by mouth once daily., Starting Wed 7/5/2023, Normal      empagliflozin (JARDIANCE) 10 mg tablet TAKE 1 TABLET BY MOUTH ONCE DAILY FOR DIABETES, Normal      ergocalciferol (ERGOCALCIFEROL) 50,000 unit Cap Take 1 capsule by mouth every 7 days., Historical Med      famotidine (PEPCID) 40 MG tablet TAKE 1 TABLET BY MOUTH NIGHTLY AS NEEDED FOR HEARTBURN, Normal      furosemide (LASIX) 40 MG tablet Take 1 tablet (40 mg total) by mouth once daily. Water pill, Starting Wed 7/5/2023, Normal      lancets Misc Check blood sugar daily before meals (goal ), Normal      linaCLOtide (LINZESS) 290 mcg Cap capsule Take 1 capsule (290 mcg total) by mouth before breakfast. constipation, Starting Thu 2/8/2024, Normal      !! lisinopriL-hydrochlorothiazide (PRINZIDE,ZESTORETIC) 20-25 mg Tab Take 1 tablet by mouth once daily., Starting Wed 7/5/2023, Normal      !! lisinopriL-hydrochlorothiazide (PRINZIDE,ZESTORETIC) 20-25 mg Tab Take 1 tablet by  "mouth once daily., Historical Med      metFORMIN (GLUCOPHAGE) 1000 MG tablet TAKE 1 TABLET BY MOUTH TWICE DAILY WITH MEALS, Normal      omeprazole (PRILOSEC) 40 MG capsule TAKE 1 CAPSULE BY MOUTH ONCE DAILY ACID  REFLUX, Normal      oxyCODONE-acetaminophen (PERCOCET) 5-325 mg per tablet Take 1 tablet by mouth every 8 (eight) hours as needed for Pain., Starting Mon 11/13/2023, Normal      pen needle, diabetic (BD ULTRA-FINE SHORT PEN NEEDLE) 31 gauge x 5/16" Ndle USE 1 PEN NEEDLE TO INJECT INSULIN ONCE DAILY., Normal      potassium chloride (KLOR-CON) 8 MEQ TbSR Take 1 tablet by mouth once daily., Historical Med      pregabalin (LYRICA) 75 MG capsule Take 1 capsule (75 mg total) by mouth 2 (two) times daily., Starting Wed 12/27/2023, Until Wed 6/26/2024, Normal      semaglutide (OZEMPIC) 1 mg/dose (4 mg/3 mL) Inject 1 mg into the skin every 7 days., Starting Mon 2/26/2024, Until Tue 2/25/2025, Normal      sucralfate (CARAFATE) 1 gram tablet TAKE 1 TABLET BY MOUTH 4 TIMES DAILY BEFORE MEAL(S) AND NIGHTLY (LINES THE STOMACH), Historical Med      sulfamethoxazole-trimethoprim 400-80mg (BACTRIM,SEPTRA) 400-80 mg per tablet Take 1 tablet by mouth 2 (two) times daily., Starting Mon 11/13/2023, Normal       !! - Potential duplicate medications found. Please discuss with provider.          PMH:  As per HPI and below:  Past Medical History:   Diagnosis Date    Diabetes mellitus     Hypertension     Mixed hyperlipidemia      History reviewed. No pertinent surgical history.    Social History     Socioeconomic History    Marital status:    Tobacco Use    Smoking status: Never    Smokeless tobacco: Never   Substance and Sexual Activity    Alcohol use: No    Drug use: No     Social Determinants of Health     Financial Resource Strain: Low Risk  (10/6/2023)    Overall Financial Resource Strain (CARDIA)     Difficulty of Paying Living Expenses: Not very hard   Food Insecurity: No Food Insecurity (10/6/2023)    Hunger Vital " Sign     Worried About Running Out of Food in the Last Year: Never true     Ran Out of Food in the Last Year: Never true   Transportation Needs: No Transportation Needs (10/6/2023)    PRAPARE - Transportation     Lack of Transportation (Medical): No     Lack of Transportation (Non-Medical): No   Physical Activity: Sufficiently Active (10/6/2023)    Exercise Vital Sign     Days of Exercise per Week: 7 days     Minutes of Exercise per Session: 110 min   Stress: No Stress Concern Present (10/6/2023)    [employer] [place] [employer] - Occupational Stress Questionnaire     Feeling of Stress : Only a little   Social Connections: Socially Isolated (10/6/2023)    Social Connection and Isolation Panel [NHANES]     Frequency of Communication with Friends and Family: More than three times a week     Frequency of Social Gatherings with Friends and Family: More than three times a week     Attends Hoahaoism Services: Never     Active Member of Clubs or Organizations: No     Attends Club or Organization Meetings: Never     Marital Status:    Housing Stability: High Risk (10/6/2023)    Housing Stability Vital Sign     Unable to Pay for Housing in the Last Year: No     Number of Places Lived in the Last Year: 3     Unstable Housing in the Last Year: No       History reviewed. No pertinent family history.    Physical Exam:    Vitals:    02/28/24 1739   BP: 137/85   Pulse: 90   Resp: 14   Temp: 98 °F (36.7 °C)     GENERAL:  No apparent distress.  Alert.    HEENT:  Moist mucous membranes.  Normocephalic and atraumatic.  Positive [place] with the head turned to the left.  Other movements of the head specifically trigger transient nystagmus with fast phase to right that quickly resolves.  NECK:  No swelling.  Midline trachea.   CARDIOVASCULAR:  Regular rate and rhythm.  2+ radial pulses.  No murmur.  PULMONARY:  Lungs clear to auscultation bilaterally.  No wheezes, rales, or rhonci.    ABDOMEN:  Non-tender and  non-distended.    EXTREMITIES:  Warm and well perfused.  Brisk capillary refill.  No peripheral edema.  NEUROLOGICAL:  Normal mental status.  Appropriate and conversant.  Normal gait without ataxia.  Cranial nerves 3-12 intact.  5/5 strength in the upper lower extremities bilaterally with equal sensation to light touch.  SKIN:  No rashes or ecchymoses.    BACK:  Atraumatic.  No CVA tenderness to palpation.      Labs Reviewed   CBC W/ AUTO DIFFERENTIAL - Abnormal; Notable for the following components:       Result Value    MPV 8.9 (*)     Lymph % 48.4 (*)     All other components within normal limits   BASIC METABOLIC PANEL   POCT GLUCOSE       Discharge Medication List as of 2/28/2024  7:36 PM        START taking these medications    Details   meclizine (ANTIVERT) 25 mg tablet Take 1 tablet (25 mg total) by mouth 3 (three) times daily as needed for Dizziness., Starting Wed 2/28/2024, Until Tue 3/5/2024 at 2359, Normal           CONTINUE these medications which have NOT CHANGED    Details   amitriptyline (ELAVIL) 25 MG tablet TAKE 1 TABLET nightly for back pain and sleep, Normal      atorvastatin (LIPITOR) 40 MG tablet TAKE 1 TABLET BY MOUTH IN THE EVENING FOR HIGH CHOLESTEROL, Normal      blood sugar diagnostic Strp Check blood sugar daily before meals (goal ), Normal      blood-glucose meter kit Check blood sugar 3 times daily before meals (goal ), Normal      cloNIDine (CATAPRES) 0.2 MG tablet Take 1 tablet by mouth once daily., Historical Med      cyclobenzaprine (FLEXERIL) 10 MG tablet Take 1 tablet (10 mg total) by mouth 3 (three) times daily., Starting Wed 12/27/2023, Normal      dicyclomine (BENTYL) 20 mg tablet Take 1 tablet (20 mg total) by mouth 3 (three) times daily as needed (abdominal pain)., Starting Thu 6/22/2023, Normal      diltiaZEM (DILACOR XR) 120 MG CDCR TAKE 1 CAPSULE BY MOUTH ONCE DAILY FOR HIGH BLOOD PRESSURE, Normal      DULoxetine (CYMBALTA) 60 MG capsule Take 1 capsule (60 mg  "total) by mouth once daily., Starting Wed 7/5/2023, Normal      empagliflozin (JARDIANCE) 10 mg tablet TAKE 1 TABLET BY MOUTH ONCE DAILY FOR DIABETES, Normal      ergocalciferol (ERGOCALCIFEROL) 50,000 unit Cap Take 1 capsule by mouth every 7 days., Historical Med      famotidine (PEPCID) 40 MG tablet TAKE 1 TABLET BY MOUTH NIGHTLY AS NEEDED FOR HEARTBURN, Normal      furosemide (LASIX) 40 MG tablet Take 1 tablet (40 mg total) by mouth once daily. Water pill, Starting Wed 7/5/2023, Normal      lancets Misc Check blood sugar daily before meals (goal ), Normal      linaCLOtide (LINZESS) 290 mcg Cap capsule Take 1 capsule (290 mcg total) by mouth before breakfast. constipation, Starting Thu 2/8/2024, Normal      !! lisinopriL-hydrochlorothiazide (PRINZIDE,ZESTORETIC) 20-25 mg Tab Take 1 tablet by mouth once daily., Starting Wed 7/5/2023, Normal      !! lisinopriL-hydrochlorothiazide (PRINZIDE,ZESTORETIC) 20-25 mg Tab Take 1 tablet by mouth once daily., Historical Med      metFORMIN (GLUCOPHAGE) 1000 MG tablet TAKE 1 TABLET BY MOUTH TWICE DAILY WITH MEALS, Normal      omeprazole (PRILOSEC) 40 MG capsule TAKE 1 CAPSULE BY MOUTH ONCE DAILY ACID  REFLUX, Normal      oxyCODONE-acetaminophen (PERCOCET) 5-325 mg per tablet Take 1 tablet by mouth every 8 (eight) hours as needed for Pain., Starting Mon 11/13/2023, Normal      pen needle, diabetic (BD ULTRA-FINE SHORT PEN NEEDLE) 31 gauge x 5/16" Ndle USE 1 PEN NEEDLE TO INJECT INSULIN ONCE DAILY., Normal      potassium chloride (KLOR-CON) 8 MEQ TbSR Take 1 tablet by mouth once daily., Historical Med      pregabalin (LYRICA) 75 MG capsule Take 1 capsule (75 mg total) by mouth 2 (two) times daily., Starting Wed 12/27/2023, Until Wed 6/26/2024, Normal      semaglutide (OZEMPIC) 1 mg/dose (4 mg/3 mL) Inject 1 mg into the skin every 7 days., Starting Mon 2/26/2024, Until Tue 2/25/2025, Normal      sucralfate (CARAFATE) 1 gram tablet TAKE 1 TABLET BY MOUTH 4 TIMES DAILY BEFORE " MEAL(S) AND NIGHTLY (LINES THE STOMACH), Historical Med      sulfamethoxazole-trimethoprim 400-80mg (BACTRIM,SEPTRA) 400-80 mg per tablet Take 1 tablet by mouth 2 (two) times daily., Starting Mon 11/13/2023, Normal       !! - Potential duplicate medications found. Please discuss with provider.          Orders Placed This Encounter   Procedures    CBC auto differential    Basic metabolic panel       Imaging Results    None              MDM:    53 y.o. male with 4 days of episodic vertigo triggered by certain head movements absent other neurological symptoms or deficit.  This is consistent with BPPV.  He denies other specific inner ear symptoms such as pressure or ringing with low suspicion for Meniere's or vestibular neuritis.  I have very low suspicion for central neurologic process such as CVA.  I do not think brain imaging or prolonged observation is indicated.  I will give meclizine and follow-up on labs with planned for ENT follow-up to consider further therapy including repositioning maneuvers.  Labs reviewed here.  Meclizine as necessary prescribed pending short-term follow-up.  Return precautions reviewed.    Diagnoses:    1. BPPV       Velasquez Palacios MD  02/28/24 1951

## 2024-02-29 NOTE — PLAN OF CARE
[facility]  Occupational Therapy Initial Evaluation     Date: 2/29/2024  Patient: Haseeb Reilly  Chart Number: [id number]  Referring Physician: Aman Patino MD  Therapy Diagnosis: No diagnosis found.    Medical Diagnosis: M25.511,G89.29 (ICD-10-CM) - Chronic right shoulder pain   Physician Orders: Eval and treat  Evaluation Date: 2/29/2024  Plan of Care Certification Date: 05/29/2024  Authorization Period: 2/8/2024 - 2/7/2025  Surgery Date and Procedure: NA  Date of Return to MD: None specified    FOTO: 1/3    Visit #: 1 of 1  Time In: 3: 00 PM  Time Out: 3: 45 PM  Total Billable Time: 45 minutes    Precautions: Standard, Standard    Subjective     Involved Side: Right shoulder  Dominant Side: right  Date of Onset:  a few years  History of Current Condition: The patient has had this condition for years and has trouble with any lifting or carrying activities.   Imaging: no image report for the right shoulder  Previous Therapy: yes - dry needling helped.     Patient's Goals for Therapy: Increase function during daily activity with decreased pain.     Pain:  Functional Pain Scale Rating 0-10:   6/10 on average  8/10 at worst     - usually during sleep.       When the patient lays on his left, his right arm will go completely numb. If he lays on his right side his left arm and hand goes completely numb. This causes the patient to wake at night consistently.    Location: SC joint, AC joint and upper trapezius area radiating to the medial scapula.   Description: aching  Aggravating Factors: lifting and carrying, sleep positioning.   Easing Factors: no easing factors reported by the patient.      Occupation:    Working presently: unemployed  Duties: work around the house, helping with mechanical issues on vehicles.     Functional Limitations/Social History:    Previous functional status includes: Independent with all ADLs.     Current Functional Status   Home/Living environment: lives  alone      Limitation of Functional Status as follows:   ADLs/IADLs:     - Feeding: independently    - Bathing: independently    - Dressing/Grooming: independent    - Home Management: independent    - Driving: independently      Leisure: Fishing    Past Medical History/Physical Systems Review:   Haseeb Reilly  has a past medical history of Diabetes mellitus, Hypertension, and Mixed hyperlipidemia.    Haseeb Reilly  has no past surgical history on file.    Haseeb has a current medication list which includes the following prescription(s): amitriptyline, atorvastatin, blood sugar diagnostic, blood-glucose meter, clonidine, cyclobenzaprine, dicyclomine, diltiazem, duloxetine, empagliflozin, ergocalciferol, famotidine, furosemide, lancets, linaclotide, lisinopril-hydrochlorothiazide, lisinopril-hydrochlorothiazide, meclizine, metformin, omeprazole, oxycodone-acetaminophen, pen needle, diabetic, potassium chloride, pregabalin, ozempic, sucralfate, and sulfamethoxazole-trimethoprim 400-80mg.    Review of patient's allergies indicates:   Allergen Reactions    Levonorgestrel-ethinyl estrad      Other reaction(s): sinus, running nose, cough, etc.    Morphine      Other reaction(s): Not available          Objective     Mental status: alert, oriented x3    Observation:   R UT tightness as compared to the left  R shoulder elevation as compared to the left.   R shoulder protraction as compared to the left.       Sensation:  tingling and pincs and needles in the hands. Dropping random objects consistently.    2/29/2024 2/29/2024    Left Right   [place] Shira     Normal 1.65-2.83     Diminished Light Touch 3.22-3.61     Diminished Protective 3.84-4.31     Loss of Protective 4.56-6.65     Untestable >6.65       Range of Motion:   Right  Shoulder ROM. Measured in degrees.   2/29/2024 2/29/2024      Left Right     Shoulder Flexion 115 120, p!     Shoulder Extension 60 70, t!     Shoulder Abduction 115 110, t!      Shoulder ER (@90) 73 70     Shoulder IR (@90) 55 60     Horizontal ADDuction 20 20     Comments: Patient could not touch his opposite shoulder during horizontal adduction.     ROM Comments:     Painful Arc:   Patient demonstrates painful arc in shoulder flexion or abduction.  Through Flexion: Elevation 120 Degrees   However, pain mostly felt in the uppertrapezius and cervical region.       Strength  Shoulder Flexion RUE:  Deltoid 3+/5   Shoulder Extension RUE: 4/5   Shoulder Abduction RUE:   Deltoid/Supraspinatus 3+/5   Shoulder Adduction RUE:   Subscapularlis/Pec Minor    Internal Rotation RUE:  Subscapularis 3+/5   External Rotation RUE:  @Side: Infraspinatus  @90: Teres Minor 3+/5   Horizontal Adduction RUE:    Shoulder Flexion LUE:    Shoulder Extension LUE:    Shoulder Abduction LUE:    Shoulder Abbduction LUE:    Internal Rotation LUE:     External Rotation LUE:     Horizontal Adduction LUE:     Comments; LUE NT due to time constraint    Subacromial Impingement Tests   Right   Left     Painful Arc  (Pain from ) positive    Logan-Erlin  (Passively flex to 90* then IR) negative    Neers  (Depress scapula, then IR and flex shoulder) negative    Yokum   (Hand on opposite shoulder, raise elbow to head) Unable          Rotator Cuff Tests   Right   Left     Drop Arm Test  (Supraspinatus) negative    Empty Can/Jobes  (Supraspinatus) negative    Patte Test/Horn Blower's  (Infraspinatus) NT    Lift Off Sign  (Subscapularis) negative    [place] Test/Belly Test  (Subscapularis) NT         Bicipital Tendonitis Tests   Right   Left     Speed's  (Shoulder flex, palm up) NT    Yergason's  (Most reliable) negative    Alanna NT         Palpation: (for pain)     Positive: SC joint      Limitations of Functional Status:   Self Care: requires increase time to don clothes and reaching overhead    Intake Outcome Measure for FOTO Shoulder Survey    Therapist reviewed FOTO scores for patient this session.  FOTO documents  entered into [employer] - see Media section.    Intake Score: 51%       Treatment       Home Exercise Program/Education:  Issued HEP (see patient instructions in EMR) and educated on modality use for pain management . Exercises were reviewed and Haseeb was able to demonstrate them prior to the end of the session.   Pt received a written copy of exercises to perform at home. Haseeb demonstrated fair  understanding of the education provided.  Pt was advised to perform these exercises free of pain, and to stop performing them if pain occurs.    Patient/Family Education: role of OT, goals for OT, scheduling/cancellations - pt verbalized understanding. Discussed insurance limitations with patient.    Additional Education provided: Education on occupational therapy's role in shoulder function; physical therapy's role in treating back and cervical pain; Cervical radiculopathy pain referral patterns.       Assessment        Haseeb presents with increased tightness in the right upper trapezius. He experiences pain during shoulder flexion and abduction but not in the AC joint area. He describes his pain mostly in the upper trapezius, neck and lower back. He did not experience pain in the AC joint during shoulder flexion or abduction, which would have indicated a painful arc and shoulder impingement.       The occupational therapist discussed referral to physical therapy to treat the upper trap, neck and lower back effectively, especially with the patient reporting success with dry needling in past therapy and having radiating numbness in the bilateral limbs when sleeping on his side. The patient has had occupational therapy services, has performed all initial exercises given at evaluation and is open to continuing to perform the exercises. However, he is agreeable to physical therapy referral as he mostly experiences pain in the cervical, back and upper trapezius region.      The patient further experiences tingling, pins and  needles and numbness in the bilateral hands at night depending on which side he is sleeping. He reports dropping items throughout the day as well. Occupational therapy is open to treating tightness in the upper trapezius and improving scapular stability but other disciplines will need to address the back and neck.       The patient did experience vertigo with a hospitalization the day prior to the initial occupational therapy evaluation and will requires therapy services if the prescribed medication does not help his current vertigo spells, per patient report.     Haseeb Reilly is a 53 y.o. male presents with limitations as described in problem list. Patient can benefit from Occupational Therapy services for Iontophoresis, ultrasound, moist heat, therapeutic exercises, home exercise program provied with written instructions, ice and strengthening and orthotics, if deemed necessary . The following goals were discussed with the patient and she is in agreement with them as to be addressed in the treatment plan.    The patient's rehab potential is Fair.     Anticipated barriers to occupational therapy: chronic condition that is more focused in the back and cervical region  Pt has no cultural, educational or language barriers to learning provided.    Medical Necessity is demonstrated by the following:  Occupational Profile/History  Co-morbidities and personal factors that may impact the plan of care [x] LOW: Brief chart review  [] MODERATE: Expanded chart review   [] HIGH: Extensive chart review    Moderate / High Support Documentation     Examination  Performance deficits relating to physical, cognitive or psychosocial skills that result in activity limitations and/or participation restrictions  [x] LOW: addressing 1-3 Performance deficits  [] MODERATE: 3-5 Performance deficits  [] HIGH: 5+ Performance deficits (please support below)    Moderate / High Support Documentation:    Physical:  Joint Mobility  Postural  Control  Pain    Cognitive:  No Deficits    Psychosocial:    Habits  Routines     Treatment Options [x] LOW: Limited options  [] MODERATE: Several options  [] HIGH: Multiple options      Decision Making/ Complexity Score: low       Goals:    LTG's (4 weeks):    1)   Decrease complaints of pain to  1 out of 10 at worst to increase functional hand use for ADL/work/leisure activities. progressing not met 2/29/2024  2)   Patient to score at 60% or more on FOTO to demonstrate improved perception of functional shoulder use. progressing not met 2/29/2024   3)   Pt will return to near to prior level of function for ADLs and household management reporting I or Mod I with ADLs (dressing, feeding, grooming, toileting). progressing not met 2/29/2024  4)  Increase ROM to spinal level T7 in IR behind back to increase functional hand use for ADLs/work/leisure activities. progressing not met 2/29/2024  5)  Increase ROM 20 degrees in SF to increase functional hand use for ADLs/work/leisure activities. progressing not met 2/29/2024  6)  Increase ROM 20 degrees in Shoulder ABDuction to increase functional hand use for ADLs/work/leisure activities. progressing not met 2/29/2024    STG's (2 weeks)  1)   Patient to be IND with HEP and modalities for pain/edema managment. progressing not met 2/29/2024  2)   Increase ROM 10 degrees in SF to increase functional hand use for ADLs/work/leisure activities. progressing not met 2/29/2024  3)   Increase ROM 10 degrees in Shoulder ABDuction to increase functional hand use for ADLs/work/leisure activities. progressing not met 2/29/2024  5)   Increase ROM to spinal level T10 in IR behind back to increase functional hand use for ADLs/work/leisure activities. progressing not met 2/29/2024  6)   Patient to score at 55% or more on FOTO to demonstrate improved perception of functional shoulder use. progressing not met 2/29/2024  7)   Decrease complaints of pain to  3 out of 10 at worst to increase  functional hand use for ADL/work/leisure activities. progressing not met 2/29/2024     Plan     Pt to be treated by Occupational Therapy 1 times per week for 3 weeks during the certification period from 2/29/2024 to 05/29/2024 to achieve the established goals.     Treatment to include: Manual therapy/joint mobilizations, Therapeutic exercises/activities., Strengthening, and Joint Protection, as well as any other treatments deemed necessary based on the patient's needs or progress.

## 2024-02-29 NOTE — ED TRIAGE NOTES
Pt c/o dizziness, nausea, no vomiting,and gait issues for the last few days. Reports feeling like the room is spinning and nauseous when he looks upward and when getting up from a sitting position. Hx DM, HTN, back issues, reports taking a lot of medications for each. Reports Ozempic being increased a couple weeks ago.

## 2024-02-29 NOTE — PROGRESS NOTES
[facility]  Occupational Therapy Initial Evaluation     Date: 2/29/2024  Patient: Haseeb Reilly  Chart Number: [id number]  Referring Physician: Aman Patino MD  Therapy Diagnosis: No diagnosis found.    Medical Diagnosis: M25.511,G89.29 (ICD-10-CM) - Chronic right shoulder pain   Physician Orders: Eval and treat  Evaluation Date: 2/29/2024  Plan of Care Certification Date: 05/29/2024  Authorization Period: 2/8/2024 - 2/7/2025  Surgery Date and Procedure: NA  Date of Return to MD: None specified    FOTO: 1/3    Visit #: 1 of 1  Time In: 3: 00 PM  Time Out: 3: 45 PM  Total Billable Time: 45 minutes    Precautions: Standard, Standard    Subjective     Involved Side: Right shoulder  Dominant Side: right  Date of Onset:  a few years  History of Current Condition: The patient has had this condition for years and has trouble with any lifting or carrying activities.   Imaging: no image report for the right shoulder  Previous Therapy: yes - dry needling helped.     Patient's Goals for Therapy: Increase function during daily activity with decreased pain.     Pain:  Functional Pain Scale Rating 0-10:   6/10 on average  8/10 at worst     - usually during sleep.       Lay on your left the right arm will go completely numb. If he lays on his right side his left arma d hand goes completely numb. This causes the patient to wake at night.       Location: SC joint, AC joint and upper trapezius area radiating to the medial scapula.   Description: aching  Aggravating Factors: lifting and carrying, sleep positioning.   Easing Factors: no easing factors reported by the patient.      Occupation:    Working presently: unemployed  Duties: work around the house, helping with mechanical issues on vehicles.     Functional Limitations/Social History:    Previous functional status includes: Independent with all ADLs.     Current Functional Status   Home/Living environment: lives alone      Limitation of  Functional Status as follows:   ADLs/IADLs:     - Feeding: independently    - Bathing: independently    - Dressing/Grooming: ***    - Home Management: ***    - Driving: independently      Leisure: Fishing    Past Medical History/Physical Systems Review:   Haseeb Reilly  has a past medical history of Diabetes mellitus, Hypertension, and Mixed hyperlipidemia.    Haseeb Reilly  has no past surgical history on file.    Haseeb has a current medication list which includes the following prescription(s): amitriptyline, atorvastatin, blood sugar diagnostic, blood-glucose meter, clonidine, cyclobenzaprine, dicyclomine, diltiazem, duloxetine, empagliflozin, ergocalciferol, famotidine, furosemide, lancets, linaclotide, lisinopril-hydrochlorothiazide, lisinopril-hydrochlorothiazide, meclizine, metformin, omeprazole, oxycodone-acetaminophen, pen needle, diabetic, potassium chloride, pregabalin, ozempic, sucralfate, and sulfamethoxazole-trimethoprim 400-80mg.    Review of patient's allergies indicates:   Allergen Reactions    Levonorgestrel-ethinyl estrad      Other reaction(s): sinus, running nose, cough, etc.    Morphine      Other reaction(s): Not available          Objective     Mental status: alert, oriented x3    Observation:   R UT tightness as compared to the left  R shoulder elevation as compared to the left.   R shoulder protraction as compared to the left.       Sensation:  tingling and pincs and needles in the hands. Dropping random objects consistently.    2/29/2024 2/29/2024    Left Right   [place] Shira     Normal 1.65-2.83     Diminished Light Touch 3.22-3.61     Diminished Protective 3.84-4.31     Loss of Protective 4.56-6.65     Untestable >6.65       Range of Motion:   {Right/Left:47028}  Shoulder ROM. Measured in degrees.   2/29/2024 2/29/2024      Left Right     Shoulder Flexion 115 120, p!     Shoulder Extension 60 70, t!     Shoulder Abduction 115 110, t!     Shoulder ER (@90) 73 70      Shoulder IR (@90) 55 60     Horizontal ADDuction 20 20     Comments: Patient could not touch his opposite shoulder during horizontal adduction.     ROM Comments:      Painful arc at *** degrees that increases with initiation of movement   Concentric  pain    Eccentric pain    Pain at end range   Pain at initiation of movement    Pain at mid range     Painful Arc:   Patient demonstrates painful arc in shoulder flexion or abduction.  Through Flexion: Elevation 120 Degrees      Strength  Shoulder Flexion RUE:  Deltoid {AMB OT SHOULDER STRENGTH:82642:n}   Shoulder Extension RUE: {AMB OT SHOULDER STRENGTH:97067:n}   Shoulder Abduction RUE:   Deltoid/Supraspinatus {AMB OT SHOULDER STRENGTH:07137:n}   Shoulder Adduction RUE:   Subscapularlis/Pec Minor {AMB OT SHOULDER STRENGTH:09761:n}   Internal Rotation RUE:  Subscapularis {AMB OT SHOULDER STRENGTH:82934:n}   External Rotation RUE:  @Side: Infraspinatus  @90: Teres Minor {AMB OT SHOULDER STRENGTH:79135:n}   Horizontal Adduction RUE: {AMB OT SHOULDER STRENGTH:11407:n}   Shoulder Flexion LUE: {AMB OT SHOULDER STRENGTH:21950:n}   Shoulder Extension LUE: {AMB OT SHOULDER STRENGTH:18687:n}   Shoulder Abduction LUE: {AMB OT SHOULDER STRENGTH:50220:n}   Shoulder Abbduction LUE: {AMB OT SHOULDER STRENGTH:75794:n}   Internal Rotation LUE:  {AMB OT SHOULDER STRENGTH:97626:n}   External Rotation LUE:  {AMB OT SHOULDER STRENGTH:30047:n}   Horizontal Adduction LUE:  {AMB OT SHOULDER STRENGTH:80168:n}     Subacromial Impingement Tests   Right   Left     Painful Arc  (Pain from ) positive    Logan-Erlin  (Passively flex to 90* then IR) negative    Neers  (Depress scapula, then IR and flex shoulder) negative    Yokum   (Hand on opposite shoulder, raise elbow to head) Unable          Rotator Cuff Tests   Right   Left     Drop Arm Test  (Supraspinatus) negative    Empty Can/Jobes  (Supraspinatus) negative    Patte Test/Horn Blower's  (Infraspinatus)     Lift Off  Sign  (Subscapularis) negative    [place] Test/Belly Test  (Subscapularis)          Bicipital Tendonitis Tests   Right   Left     Speed's  (Shoulder flex, palm up)     Yergason's  (Most reliable) negative    Alanna          Palpation: (for pain)     Positive: {OT SHOULDER PALPATION:67137}      Limitations of Functional Status:   Self Care: {AMB OT SHOULDER SELF CARE:40449}    Intake Outcome Measure for FOTO Hand/Wrist/Finger Survey    Therapist reviewed FOTO scores for patient this session.  FOTO documents entered into [employer] - see Media section.    Intake Score: ***%       Treatment     Treatment Time In: ***  Treatment Time Out: ***  Total Treatment time separate from Evaluation time:***    Haseeb performed therapeutic exercises for *** minutes including:  -***        Home Exercise Program/Education:  Issued HEP (see patient instructions in EMR) and educated on modality use for pain management . Exercises were reviewed and Haseeb was able to demonstrate them prior to the end of the session.   Pt received a written copy of exercises to perform at home. Haseeb demonstrated {Desc; good/fair/poor:42561} understanding of the education provided.  Pt was advised to perform these exercises free of pain, and to stop performing them if pain occurs.    Patient/Family Education: role of OT, goals for OT, scheduling/cancellations - pt verbalized understanding. Discussed insurance limitations with patient.    Additional Education provided: ***      Assessment     Haseeb Reilly is a 53 y.o. male presents with limitations as described in problem list. Patient can benefit from Occupational Therapy services for Iontophoresis, ultrasound, moist heat, therapeutic exercises, home exercise program provied with written instructions, ice and strengthening and orthotics, if deemed necessary . The following goals were discussed with the patient and she is in agreement with them as to be addressed in the treatment plan.    The  patient's rehab potential is {REHAB PROGNOSIS OHS:24982}.     Anticipated barriers to occupational therapy: ***  Pt has no cultural, educational or language barriers to learning provided.    Medical Necessity is demonstrated by the following:  Occupational Profile/History  Co-morbidities and personal factors that may impact the plan of care [] LOW: Brief chart review  [] MODERATE: Expanded chart review   [] HIGH: Extensive chart review    Moderate / High Support Documentation     Examination  Performance deficits relating to physical, cognitive or psychosocial skills that result in activity limitations and/or participation restrictions  [] LOW: addressing 1-3 Performance deficits  [] MODERATE: 3-5 Performance deficits  [] HIGH: 5+ Performance deficits (please support below)    Moderate / High Support Documentation:    Physical:  {Physical:71271}    Cognitive:  {Cognitive:45158}    Psychosocial:    {Psychosocial:68224}     Treatment Options [] LOW: Limited options  [] MODERATE: Several options  [] HIGH: Multiple options      Decision Making/ Complexity Score: {Desc; low/moderate/high:919788}       Goals:    LTG's (*** weeks):  1)   Increase ROM to WFL in all shoulder planes to increase functional hand use for *** progressing not met 2/29/2024  2)   Decrease complaints of pain to  {NUMBERS 1-10:52252} out of 10 at worst to increase functional hand use for ADL/work/leisure activities. progressing not met 2/29/2024  3)   Patient to score at ***% or more on FOTO to demonstrate improved perception of functional *** use. progressing not met 2/29/2024   4)   Pt will return to near to prior level of function for ADLs and household management reporting I or Mod I with ADLs (dressing, feeding, grooming, toileting). progressing not met 2/29/2024    STG's (*** weeks)  1)   Patient to be IND with HEP and modalities for pain/edema managment. progressing not met 2/29/2024  2)   Increase ROM *** degrees in SF/SE to increase  functional hand use for ADLs/work/leisure activities. progressing not met 2/29/2024  3)   Increase ROM *** degrees in Shoulder ABDuction to increase functional hand use for ADLs/work/leisure activities. progressing not met 2/29/2024  4)   Increase ROM *** degrees in ER to increase functional hand use for ADLs/work/leisure activities. progressing not met 2/29/2024  5)   Increase ROM to spinal level *** in IR behind back to increase functional hand use for ADLs/work/leisure activities. progressing not met 2/29/2024  6)   Patient to score at ***% or more on FOTO to demonstrate improved perception of functional *** use. progressing not met 2/29/2024  7)   Decrease complaints of pain to  {NUMBERS 1-10:86803} out of 10 at worst to increase functional hand use for ADL/work/leisure activities. progressing not met 2/29/2024     Plan     Pt to be treated by Occupational Therapy *** times per week for *** weeks during the certification period from 2/29/2024 to *** to achieve the established goals.     Treatment to include: {AMB OT HAND TX:94364}, as well as any other treatments deemed necessary based on the patient's needs or progress.     Sudheer Brandt, OT

## 2024-03-01 PROBLEM — M25.511 CHRONIC RIGHT SHOULDER PAIN: Status: ACTIVE | Noted: 2024-03-01

## 2024-03-01 PROBLEM — G89.29 CHRONIC RIGHT SHOULDER PAIN: Status: ACTIVE | Noted: 2024-03-01

## 2024-03-06 DIAGNOSIS — E11.9 TYPE 2 DIABETES MELLITUS WITHOUT COMPLICATION, UNSPECIFIED WHETHER LONG TERM INSULIN USE: ICD-10-CM

## 2024-03-07 ENCOUNTER — OFFICE VISIT (OUTPATIENT)
Dept: PRIMARY CARE CLINIC | Facility: CLINIC | Age: 54
End: 2024-03-07
Payer: MEDICAID

## 2024-03-07 VITALS
WEIGHT: 264.31 LBS | BODY MASS INDEX: 37 KG/M2 | OXYGEN SATURATION: 98 % | HEIGHT: 71 IN | HEART RATE: 114 BPM | SYSTOLIC BLOOD PRESSURE: 116 MMHG | DIASTOLIC BLOOD PRESSURE: 70 MMHG

## 2024-03-07 DIAGNOSIS — H81.10 BENIGN PAROXYSMAL POSITIONAL VERTIGO, UNSPECIFIED LATERALITY: Primary | ICD-10-CM

## 2024-03-07 PROCEDURE — 99215 OFFICE O/P EST HI 40 MIN: CPT | Mod: PBBFAC,PN | Performed by: FAMILY MEDICINE

## 2024-03-07 PROCEDURE — 3078F DIAST BP <80 MM HG: CPT | Mod: CPTII,,, | Performed by: FAMILY MEDICINE

## 2024-03-07 PROCEDURE — 1159F MED LIST DOCD IN RCRD: CPT | Mod: CPTII,,, | Performed by: FAMILY MEDICINE

## 2024-03-07 PROCEDURE — 3044F HG A1C LEVEL LT 7.0%: CPT | Mod: CPTII,,, | Performed by: FAMILY MEDICINE

## 2024-03-07 PROCEDURE — 4010F ACE/ARB THERAPY RXD/TAKEN: CPT | Mod: CPTII,,, | Performed by: FAMILY MEDICINE

## 2024-03-07 PROCEDURE — 3072F LOW RISK FOR RETINOPATHY: CPT | Mod: CPTII,,, | Performed by: FAMILY MEDICINE

## 2024-03-07 PROCEDURE — 3074F SYST BP LT 130 MM HG: CPT | Mod: CPTII,,, | Performed by: FAMILY MEDICINE

## 2024-03-07 PROCEDURE — 99999 PR PBB SHADOW E&M-EST. PATIENT-LVL V: CPT | Mod: PBBFAC,,, | Performed by: FAMILY MEDICINE

## 2024-03-07 PROCEDURE — 3008F BODY MASS INDEX DOCD: CPT | Mod: CPTII,,, | Performed by: FAMILY MEDICINE

## 2024-03-07 PROCEDURE — 99213 OFFICE O/P EST LOW 20 MIN: CPT | Mod: S$PBB,,, | Performed by: FAMILY MEDICINE

## 2024-03-07 RX ORDER — PAROXETINE HYDROCHLORIDE 40 MG/1
40 TABLET, FILM COATED ORAL
COMMUNITY
Start: 2024-01-13

## 2024-03-07 RX ORDER — MECLIZINE HYDROCHLORIDE 25 MG/1
25 TABLET ORAL 3 TIMES DAILY PRN
Qty: 15 TABLET | Refills: 0 | Status: SHIPPED | OUTPATIENT
Start: 2024-03-07

## 2024-03-07 NOTE — PROGRESS NOTES
Clinic Note  3/7/2024      Subjective:       Patient ID:  Haseeb is a 53 y.o. male being seen for an established visit.    Chief Complaint: Dizziness    Vertigo-over turns his head to the left or right or looks up you will have a sensation of significant vertigo with the room spinning, dizziness, nausea, difficulty with walking.  The symptoms are worse whenever he tries to lie down.  Patient went to the ER on 02/28/2024 and diagnosed with BPPV and told to follow-up with ENT.  Patient reports that while in the ER, the ER physician did a maneuver with patient that significantly triggered his vertigo.  Was prescribed meclizine which patient is taking 3 times daily, does mildly help with symptoms.  Possibly has mild tinnitus.      Review of Systems   Constitutional:  Negative for chills, fever, malaise/fatigue and weight loss.   HENT:  Negative for congestion, sinus pain and sore throat.    Respiratory:  Negative for cough, shortness of breath and wheezing.    Cardiovascular:  Negative for chest pain and palpitations.   Gastrointestinal:  Negative for constipation, diarrhea, nausea and vomiting.   Genitourinary:  Negative for dysuria, frequency and urgency.   Musculoskeletal:  Negative for myalgias.   Skin:  Negative for rash.   Neurological:  Positive for dizziness. Negative for headaches.       Medication List with Changes/Refills   Current Medications    AMITRIPTYLINE (ELAVIL) 25 MG TABLET    TAKE 1 TABLET nightly for back pain and sleep    ATORVASTATIN (LIPITOR) 40 MG TABLET    TAKE 1 TABLET BY MOUTH IN THE EVENING FOR HIGH CHOLESTEROL    BLOOD SUGAR DIAGNOSTIC STRP    Check blood sugar daily before meals (goal )    BLOOD-GLUCOSE METER KIT    Check blood sugar 3 times daily before meals (goal )    CLONIDINE (CATAPRES) 0.2 MG TABLET    Take 1 tablet by mouth once daily.    CYCLOBENZAPRINE (FLEXERIL) 10 MG TABLET    Take 1 tablet (10 mg total) by mouth 3 (three) times daily.    DICYCLOMINE (BENTYL) 20  "MG TABLET    Take 1 tablet (20 mg total) by mouth 3 (three) times daily as needed (abdominal pain).    DILTIAZEM (DILACOR XR) 120 MG CDCR    TAKE 1 CAPSULE BY MOUTH ONCE DAILY FOR HIGH BLOOD PRESSURE    DULOXETINE (CYMBALTA) 60 MG CAPSULE    Take 1 capsule (60 mg total) by mouth once daily.    EMPAGLIFLOZIN (JARDIANCE) 10 MG TABLET    TAKE 1 TABLET BY MOUTH ONCE DAILY FOR DIABETES    ERGOCALCIFEROL (ERGOCALCIFEROL) 50,000 UNIT CAP    Take 1 capsule by mouth every 7 days.    FAMOTIDINE (PEPCID) 40 MG TABLET    TAKE 1 TABLET BY MOUTH NIGHTLY AS NEEDED FOR HEARTBURN    FUROSEMIDE (LASIX) 40 MG TABLET    Take 1 tablet (40 mg total) by mouth once daily. Water pill    LANCETS MISC    Check blood sugar daily before meals (goal )    LINACLOTIDE (LINZESS) 290 MCG CAP CAPSULE    Take 1 capsule (290 mcg total) by mouth before breakfast. constipation    LISINOPRIL-HYDROCHLOROTHIAZIDE (PRINZIDE,ZESTORETIC) 20-25 MG TAB    Take 1 tablet by mouth once daily.    METFORMIN (GLUCOPHAGE) 1000 MG TABLET    TAKE 1 TABLET BY MOUTH TWICE DAILY WITH MEALS    OMEPRAZOLE (PRILOSEC) 40 MG CAPSULE    TAKE 1 CAPSULE BY MOUTH ONCE DAILY ACID  REFLUX    OXYCODONE-ACETAMINOPHEN (PERCOCET) 5-325 MG PER TABLET    Take 1 tablet by mouth every 8 (eight) hours as needed for Pain.    PAROXETINE (PAXIL) 40 MG TABLET    Take 40 mg by mouth.    PEN NEEDLE, DIABETIC (BD ULTRA-FINE SHORT PEN NEEDLE) 31 GAUGE X 5/16" NDLE    USE 1 PEN NEEDLE TO INJECT INSULIN ONCE DAILY.    POTASSIUM CHLORIDE (KLOR-CON) 8 MEQ TBSR    Take 1 tablet by mouth once daily.    PREGABALIN (LYRICA) 75 MG CAPSULE    Take 1 capsule (75 mg total) by mouth 2 (two) times daily.    SEMAGLUTIDE (OZEMPIC) 1 MG/DOSE (4 MG/3 ML)    Inject 1 mg into the skin every 7 days.    SUCRALFATE (CARAFATE) 1 GRAM TABLET    TAKE 1 TABLET BY MOUTH 4 TIMES DAILY BEFORE MEAL(S) AND NIGHTLY (LINES THE STOMACH)   Changed and/or Refilled Medications    Modified Medication Previous Medication    MECLIZINE " "(ANTIVERT) 25 MG TABLET meclizine (ANTIVERT) 25 mg tablet       Take 1 tablet (25 mg total) by mouth 3 (three) times daily as needed for Dizziness.    Take 1 tablet (25 mg total) by mouth 3 (three) times daily as needed for Dizziness.   Discontinued Medications    LISINOPRIL-HYDROCHLOROTHIAZIDE (PRINZIDE,ZESTORETIC) 20-25 MG TAB    Take 1 tablet by mouth once daily.    SULFAMETHOXAZOLE-TRIMETHOPRIM 400-80MG (BACTRIM,SEPTRA) 400-80 MG PER TABLET    Take 1 tablet by mouth 2 (two) times daily.       Patient Active Problem List   Diagnosis    Chronic right-sided low back pain with right-sided sciatica    Type 2 diabetes mellitus without complication, without long-term current use of insulin    Constipation    Essential hypertension    Hyperlipidemia    Osteoarthritis of spine with radiculopathy, lumbar region    Polyneuropathy    Decreased ROM of right shoulder    Decreased strength of upper extremity    Poor posture    Erectile dysfunction    Vitamin B12 deficiency    Class 2 severe obesity due to excess calories with serious comorbidity and body mass index (BMI) of 38.0 to 38.9 in adult    History of malignant carcinoid tumor of rectum    Chronic right shoulder pain           Objective:      /70 (BP Location: Left arm, Patient Position: Sitting, BP Method: Medium (Manual))   Pulse (!) 114   Ht 5' 11" (1.803 m)   Wt 119.9 kg (264 lb 5.3 oz)   SpO2 98%   BMI 36.87 kg/m²   Estimated body mass index is 36.87 kg/m² as calculated from the following:    Height as of this encounter: 5' 11" (1.803 m).    Weight as of this encounter: 119.9 kg (264 lb 5.3 oz).  Physical Exam  Vitals reviewed.   Constitutional:       General: He is not in acute distress.     Appearance: He is obese. He is not diaphoretic.   HENT:      Head: Normocephalic and atraumatic.      Right Ear: Tympanic membrane, ear canal and external ear normal. There is no impacted cerumen.      Left Ear: Tympanic membrane, ear canal and external ear normal. " There is no impacted cerumen.   Eyes:      Conjunctiva/sclera: Conjunctivae normal.   Cardiovascular:      Rate and Rhythm: Normal rate and regular rhythm.      Heart sounds: Normal heart sounds.   Pulmonary:      Effort: Pulmonary effort is normal. No respiratory distress.      Breath sounds: Normal breath sounds. No wheezing.   Abdominal:      General: Bowel sounds are normal.      Palpations: Abdomen is soft.   Musculoskeletal:         General: Normal range of motion.      Cervical back: Normal range of motion.   Skin:     General: Skin is warm and dry.      Findings: No erythema or rash.   Neurological:      Mental Status: He is alert and oriented to person, place, and time.   Psychiatric:         Mood and Affect: Mood and affect normal.         Behavior: Behavior normal.         Thought Content: Thought content normal.         Judgment: Judgment normal.           Assessment and Plan:     1. Benign paroxysmal positional vertigo, unspecified laterality  - symptoms most consistent with benign positional vertigo as the dizziness is triggered by certain positions especially with turning his head and lying down.  Patient was given instructions on Epley maneuver for home prior to bedtime.  Discussed trial be sending only a limited refill of the meclizine  - Ambulatory referral/consult to ENT; Future  - meclizine (ANTIVERT) 25 mg tablet; Take 1 tablet (25 mg total) by mouth 3 (three) times daily as needed for Dizziness.  Dispense: 15 tablet; Refill: 0      Follow Up:   No follow-ups on file.    Other Orders Placed This Visit:  Orders Placed This Encounter   Procedures    Ambulatory referral/consult to ENT         Aman Patino MD        This note is dictated on M*Modal word recognition program.  There are word recognition mistakes that are occasionally missed on review.

## 2024-03-07 NOTE — PATIENT INSTRUCTIONS
Epley maneuver for Benign Positional Vertigo    The Epley maneuvers can be done at home. We often recommend the home-Epley to our patients who have a clear diagnosis. This procedure seems to be even more effective than the in-office procedure, perhaps because it is repeated every night for a week. At this writing there are many home maneuvers. As there is only one way to move things around in a [place], they all boil down to the same head positions - -just different ways of getting there. The Epley maneuver is the best established.    The home Epley method (for the left side) is performed as shown on the figure. The maneuver for the right side is just the mirror image.    One stays in each of the supine (lying down) positions for 30 seconds, and in the sitting upright position (top) for 1 minute. Thus, once cycle takes 2 1/2 minutes. Typically 3 cycles are performed just prior to going to sleep. It is best to do them at night rather than in the morning or midday, as if one becomes dizzy following the exercises, then it can resolve while one is sleeping.

## 2024-03-19 RX ORDER — FAMOTIDINE 40 MG/1
TABLET, FILM COATED ORAL
Qty: 90 TABLET | Refills: 3 | Status: SHIPPED | OUTPATIENT
Start: 2024-03-19

## 2024-03-19 NOTE — TELEPHONE ENCOUNTER
No care due was identified.  [facility] Embedded Care Due Messages. Reference number: [id number].   3/19/2024 10:51:14 AM CDT

## 2024-03-20 NOTE — TELEPHONE ENCOUNTER
Refill Decision Note   Haseeb Reilly  is requesting a refill authorization.  Brief Assessment and Rationale for Refill:  Approve     Medication Therapy Plan:         Alert overridden per protocol: Yes   Comments:     Note composed:11:05 PM 03/19/2024

## 2024-04-03 ENCOUNTER — PATIENT MESSAGE (OUTPATIENT)
Dept: ADMINISTRATIVE | Facility: HOSPITAL | Age: 54
End: 2024-04-03
Payer: MEDICAID

## 2024-04-06 DIAGNOSIS — E11.9 TYPE 2 DIABETES MELLITUS WITHOUT COMPLICATION, WITHOUT LONG-TERM CURRENT USE OF INSULIN: ICD-10-CM

## 2024-04-06 NOTE — TELEPHONE ENCOUNTER
No care due was identified.  [facility] Embedded Care Due Messages. Reference number: [id number].   4/06/2024 9:26:57 AM CDT

## 2024-04-07 RX ORDER — METFORMIN HYDROCHLORIDE 1000 MG/1
TABLET ORAL
Qty: 180 TABLET | Refills: 1 | Status: SHIPPED | OUTPATIENT
Start: 2024-04-07

## 2024-04-07 NOTE — TELEPHONE ENCOUNTER
Refill Decision Note   Haseeb Reilly  is requesting a refill authorization.  Brief Assessment and Rationale for Refill:  Approve     Medication Therapy Plan:        Comments:     Note composed:9:10 AM 04/07/2024

## 2024-04-15 ENCOUNTER — PATIENT MESSAGE (OUTPATIENT)
Dept: GASTROENTEROLOGY | Facility: CLINIC | Age: 54
End: 2024-04-15
Payer: MEDICAID

## 2024-04-18 DIAGNOSIS — E78.5 HYPERLIPIDEMIA, UNSPECIFIED HYPERLIPIDEMIA TYPE: ICD-10-CM

## 2024-04-19 NOTE — TELEPHONE ENCOUNTER
Refill Routing Note   Medication(s) are not appropriate for processing by [facility] for the following reason(s):        Required labs outdated    [facility] action(s):  Defer               Appointments  past 12m or future 3m with PCP    Date Provider   Last Visit   3/7/2024 Aman Patino MD   Next Visit   Visit date not found Aman Patino MD   ED visits in past 90 days: 1        Note composed:2:29 PM 04/19/2024

## 2024-04-20 RX ORDER — ATORVASTATIN CALCIUM 40 MG/1
TABLET, FILM COATED ORAL
Qty: 90 TABLET | Refills: 3 | Status: SHIPPED | OUTPATIENT
Start: 2024-04-20

## 2024-05-30 DIAGNOSIS — K21.9 GASTRIC REFLUX: ICD-10-CM

## 2024-05-30 NOTE — TELEPHONE ENCOUNTER
No care due was identified.  [facility] Embedded Care Due Messages. Reference number: [id number].   5/30/2024 1:12:34 PM CDT

## 2024-06-05 RX ORDER — OMEPRAZOLE 40 MG/1
40 CAPSULE, DELAYED RELEASE ORAL DAILY PRN
Qty: 90 CAPSULE | Refills: 3 | Status: SHIPPED | OUTPATIENT
Start: 2024-06-05

## 2024-06-17 DIAGNOSIS — E11.9 TYPE 2 DIABETES MELLITUS WITHOUT COMPLICATION, WITHOUT LONG-TERM CURRENT USE OF INSULIN: ICD-10-CM

## 2024-06-17 NOTE — TELEPHONE ENCOUNTER
Care Due:                  Date            Visit Type   Department     Provider  --------------------------------------------------------------------------------                                EP -                              PRIMARY      [facility] PRIMARY  Last Visit: 03-      CARE ([facility])   CARE           TIGIST HICKMAN  Next Visit: None Scheduled  None         None Found                                                            Last  Test          Frequency    Reason                     Performed    Due Date  --------------------------------------------------------------------------------    CMP.........  12 months..  atorvastatin.............  09- 09-    [facility] Embedded Care Due Messages. Reference number: [id number].   6/17/2024 1:34:12 PM CDT

## 2024-06-17 NOTE — TELEPHONE ENCOUNTER
----- Message from Estefanía Morse sent at 6/17/2024  1:28 PM CDT -----  Contact: Pt  [phone]  Requesting an RX refill or new RX.    Is this a refill or new RX: Refill    RX name and strength (copy/paste from chart):  semaglutide (OZEMPIC) 1 mg/dose (4 mg/3 mL)    Is this a 30 day or 90 day RX: 30    Pharmacy name and phone # (copy/paste from chart):    [facility] [address] [place], [place] - [address]  [address] [facility] [address]  Phone: [phone] Fax: [phone]    PA will be needed as well    Please call and advise.    Thank You

## 2024-06-18 RX ORDER — SEMAGLUTIDE 1.34 MG/ML
1 INJECTION, SOLUTION SUBCUTANEOUS
Qty: 3 ML | Refills: 0 | Status: SHIPPED | OUTPATIENT
Start: 2024-06-18 | End: 2025-06-18

## 2024-07-04 DIAGNOSIS — G62.9 POLYNEUROPATHY: ICD-10-CM

## 2024-07-04 NOTE — TELEPHONE ENCOUNTER
No care due was identified.  [facility] Embedded Care Due Messages. Reference number: [id number].   7/04/2024 11:24:24 AM CDT

## 2024-07-09 ENCOUNTER — PATIENT MESSAGE (OUTPATIENT)
Dept: ADMINISTRATIVE | Facility: HOSPITAL | Age: 54
End: 2024-07-09
Payer: MEDICAID

## 2024-07-10 RX ORDER — PREGABALIN 75 MG/1
75 CAPSULE ORAL 2 TIMES DAILY
Qty: 60 CAPSULE | Refills: 2 | Status: SHIPPED | OUTPATIENT
Start: 2024-07-10

## 2024-07-12 DIAGNOSIS — I10 ESSENTIAL HYPERTENSION: ICD-10-CM

## 2024-07-12 NOTE — TELEPHONE ENCOUNTER
No care due was identified.  [facility] Embedded Care Due Messages. Reference number: [id number].   7/12/2024 5:58:06 PM CDT

## 2024-07-13 RX ORDER — FUROSEMIDE 40 MG/1
TABLET ORAL
Qty: 90 TABLET | Refills: 2 | Status: SHIPPED | OUTPATIENT
Start: 2024-07-13

## 2024-07-13 NOTE — TELEPHONE ENCOUNTER
Refill Decision Note   Haseeb Reilly  is requesting a refill authorization.  Brief Assessment and Rationale for Refill:  Approve     Medication Therapy Plan:         Alert overridden per protocol: Yes   Comments:     Note composed:8:03 AM 07/13/2024

## 2024-07-16 DIAGNOSIS — G89.29 CHRONIC RIGHT-SIDED LOW BACK PAIN WITH RIGHT-SIDED SCIATICA: ICD-10-CM

## 2024-07-16 DIAGNOSIS — M54.41 CHRONIC RIGHT-SIDED LOW BACK PAIN WITH RIGHT-SIDED SCIATICA: ICD-10-CM

## 2024-07-16 RX ORDER — DULOXETIN HYDROCHLORIDE 60 MG/1
60 CAPSULE, DELAYED RELEASE ORAL
Qty: 90 CAPSULE | Refills: 2 | Status: SHIPPED | OUTPATIENT
Start: 2024-07-16

## 2024-07-16 NOTE — TELEPHONE ENCOUNTER
No care due was identified.  [facility] Embedded Care Due Messages. Reference number: [id number].   7/16/2024 9:56:56 AM CDT

## 2024-07-16 NOTE — TELEPHONE ENCOUNTER
Refill Decision Note   Haseeb Reilly  is requesting a refill authorization.  Brief Assessment and Rationale for Refill:  Approve     Medication Therapy Plan:         Alert overridden per protocol: Yes   Comments:     Note composed:10:59 AM 07/16/2024

## 2024-07-28 NOTE — TELEPHONE ENCOUNTER
No care due was identified.  [facility] Embedded Care Due Messages. Reference number: [id number].   7/28/2024 12:28:29 PM CDT

## 2024-07-29 RX ORDER — LISINOPRIL AND HYDROCHLOROTHIAZIDE 20; 25 MG/1; MG/1
1 TABLET ORAL
Qty: 90 TABLET | Refills: 3 | Status: SHIPPED | OUTPATIENT
Start: 2024-07-29

## 2024-07-29 NOTE — TELEPHONE ENCOUNTER
Refill Routing Note   Medication(s) are not appropriate for processing by [facility] for the following reason(s):        No active prescription written by provider    [facility] action(s):  Defer               Appointments  past 12m or future 3m with PCP    Date Provider   Last Visit   3/7/2024 Aman Patino MD   Next Visit   Visit date not found Aman Patino MD   ED visits in past 90 days: 0        Note composed:3:51 PM 07/29/2024

## 2024-07-30 DIAGNOSIS — E11.9 TYPE 2 DIABETES MELLITUS WITHOUT COMPLICATION, WITHOUT LONG-TERM CURRENT USE OF INSULIN: ICD-10-CM

## 2024-07-30 NOTE — TELEPHONE ENCOUNTER
Refill Decision Note   Haseeb Tommie  is requesting a refill authorization.  Brief Assessment and Rationale for Refill:  Quick Discontinue     Medication Therapy Plan:         Medication reconciliation completed: Yes   Comments:     Note composed:4:26 PM 07/30/2024

## 2024-08-14 DIAGNOSIS — E11.9 TYPE 2 DIABETES MELLITUS WITHOUT COMPLICATION: ICD-10-CM

## 2024-08-19 ENCOUNTER — PATIENT MESSAGE (OUTPATIENT)
Dept: ADMINISTRATIVE | Facility: HOSPITAL | Age: 54
End: 2024-08-19
Payer: MEDICARE

## 2024-08-20 ENCOUNTER — PATIENT OUTREACH (OUTPATIENT)
Dept: ADMINISTRATIVE | Facility: HOSPITAL | Age: 54
End: 2024-08-20
Payer: MEDICARE

## 2024-08-20 DIAGNOSIS — E11.9 TYPE 2 DIABETES MELLITUS WITHOUT COMPLICATION, WITHOUT LONG-TERM CURRENT USE OF INSULIN: Primary | ICD-10-CM

## 2024-08-20 NOTE — PROGRESS NOTES
Health Maintenance Due   Topic Date Due    Diabetes Urine Screening  Never done    Pneumococcal Vaccines (Age 0-64) (1 of 2 - PCV) Never done    TETANUS VACCINE  Never done    Shingles Vaccine (1 of 2) Never done    COVID-19 Vaccine (4 - 2023-24 season) 09/01/2023    Lipid Panel  02/08/2024    Eye Exam  02/27/2024    Hemoglobin A1c  08/08/2024      Lab scheduled.08/20/2024 , eye exam scheduled

## 2024-08-27 ENCOUNTER — CLINICAL SUPPORT (OUTPATIENT)
Dept: PRIMARY CARE CLINIC | Facility: CLINIC | Age: 54
End: 2024-08-27
Attending: FAMILY MEDICINE
Payer: MEDICARE

## 2024-08-27 DIAGNOSIS — E11.9 TYPE 2 DIABETES MELLITUS WITHOUT COMPLICATION, UNSPECIFIED WHETHER LONG TERM INSULIN USE: ICD-10-CM

## 2024-08-27 PROCEDURE — 2024F 7 FLD RTA PHOTO EVC RTNOPTHY: CPT | Mod: CPTII,S$GLB,, | Performed by: OPTOMETRIST

## 2024-08-27 PROCEDURE — 92228 IMG RTA DETC/MNTR DS PHY/QHP: CPT | Mod: 26,S$GLB,, | Performed by: OPTOMETRIST

## 2024-08-27 PROCEDURE — 92228 IMG RTA DETC/MNTR DS PHY/QHP: CPT | Mod: TC,S$GLB,, | Performed by: FAMILY MEDICINE

## 2024-08-27 NOTE — PROGRESS NOTES
Haseeb Reilly is a 53 y.o. male here for a diabetic eye screening with non-dilated fundus photos per Dr. Disla.    Patient cooperative?: Yes  Small pupils?: No  Last eye exam: 2/27/2023    For exam results, see Encounter Report.

## 2024-08-29 ENCOUNTER — OFFICE VISIT (OUTPATIENT)
Dept: PRIMARY CARE CLINIC | Facility: CLINIC | Age: 54
End: 2024-08-29
Payer: MEDICARE

## 2024-08-29 VITALS
HEIGHT: 71 IN | OXYGEN SATURATION: 100 % | SYSTOLIC BLOOD PRESSURE: 114 MMHG | BODY MASS INDEX: 33.27 KG/M2 | DIASTOLIC BLOOD PRESSURE: 74 MMHG | HEART RATE: 98 BPM | RESPIRATION RATE: 16 BRPM | WEIGHT: 237.63 LBS

## 2024-08-29 DIAGNOSIS — G89.29 CHRONIC RIGHT SHOULDER PAIN: Primary | ICD-10-CM

## 2024-08-29 DIAGNOSIS — N52.9 ERECTILE DYSFUNCTION, UNSPECIFIED ERECTILE DYSFUNCTION TYPE: ICD-10-CM

## 2024-08-29 DIAGNOSIS — G89.29 CHRONIC RIGHT-SIDED LOW BACK PAIN WITH RIGHT-SIDED SCIATICA: ICD-10-CM

## 2024-08-29 DIAGNOSIS — M54.41 CHRONIC RIGHT-SIDED LOW BACK PAIN WITH RIGHT-SIDED SCIATICA: ICD-10-CM

## 2024-08-29 DIAGNOSIS — M25.511 CHRONIC RIGHT SHOULDER PAIN: Primary | ICD-10-CM

## 2024-08-29 DIAGNOSIS — E11.9 TYPE 2 DIABETES MELLITUS WITHOUT COMPLICATION, WITHOUT LONG-TERM CURRENT USE OF INSULIN: ICD-10-CM

## 2024-08-29 DIAGNOSIS — M54.50 CHRONIC BILATERAL LOW BACK PAIN WITHOUT SCIATICA: ICD-10-CM

## 2024-08-29 DIAGNOSIS — I10 ESSENTIAL HYPERTENSION: ICD-10-CM

## 2024-08-29 DIAGNOSIS — G89.29 CHRONIC BILATERAL LOW BACK PAIN WITHOUT SCIATICA: ICD-10-CM

## 2024-08-29 PROCEDURE — 3061F NEG MICROALBUMINURIA REV: CPT | Mod: CPTII,S$GLB,,

## 2024-08-29 PROCEDURE — 1160F RVW MEDS BY RX/DR IN RCRD: CPT | Mod: CPTII,S$GLB,,

## 2024-08-29 PROCEDURE — 3008F BODY MASS INDEX DOCD: CPT | Mod: CPTII,S$GLB,,

## 2024-08-29 PROCEDURE — 3044F HG A1C LEVEL LT 7.0%: CPT | Mod: CPTII,S$GLB,,

## 2024-08-29 PROCEDURE — 1159F MED LIST DOCD IN RCRD: CPT | Mod: CPTII,S$GLB,,

## 2024-08-29 PROCEDURE — 3074F SYST BP LT 130 MM HG: CPT | Mod: CPTII,S$GLB,,

## 2024-08-29 PROCEDURE — 99214 OFFICE O/P EST MOD 30 MIN: CPT | Mod: S$GLB,,,

## 2024-08-29 PROCEDURE — 3078F DIAST BP <80 MM HG: CPT | Mod: CPTII,S$GLB,,

## 2024-08-29 PROCEDURE — 99999 PR PBB SHADOW E&M-EST. PATIENT-LVL V: CPT | Mod: PBBFAC,,,

## 2024-08-29 PROCEDURE — 4010F ACE/ARB THERAPY RXD/TAKEN: CPT | Mod: CPTII,S$GLB,,

## 2024-08-29 PROCEDURE — 3066F NEPHROPATHY DOC TX: CPT | Mod: CPTII,S$GLB,,

## 2024-08-29 RX ORDER — CYCLOBENZAPRINE HCL 10 MG
10 TABLET ORAL 3 TIMES DAILY PRN
Qty: 90 TABLET | Refills: 0 | Status: SHIPPED | OUTPATIENT
Start: 2024-08-29

## 2024-08-29 NOTE — PROGRESS NOTES
Subjective     Patient ID: Haseeb Reilly is a 53 y.o. male.    Chief Complaint: Back Pain and Shoulder Pain      Haseeb Reilly is a 53 year old female, presents to clinic for general medical exam.  New to me. PCP  is Dr. Patino. Medical and surgical history, in addition to problem list reviewed and listed below.    Hypertension - Controlled. Taking diltiazem, lisinopril-hydrochlorothiazide, clonidine and atorvastatin.    Diabetes - Last hemoglobin A1C 5.5.  Taking Jardiance, metformin and Ozempic.  States appetite been decreased since stating Ozempic; ate a mandarin and drank a cup of coffee so far today. Reports  okay with weight loss; goal is to reach 170 pounds.    Reports missed appointment yesterday; presents today with complain of back and right shoulder pain.  Had Xray of shoulders on 07/30/2024:  both shows mild degenerative changes are present and some spurring noted particularly in the left acromioclavicular joint.   Patient states possible MRI of shoulders on 09/12/2024.    Shoulder Pain   The pain is present in the right shoulder. This is a chronic problem. The current episode started more than 1 year ago. The problem occurs intermittently. The problem has been waxing and waning. Quality: Stabbing. The pain is at a severity of 5/10 (because not moving). Associated symptoms include a limited range of motion, numbness (in hands) and tingling (in hands). Pertinent negatives include no fever, headaches, inability to bear weight, itching, joint locking, joint swelling, stiffness or visual symptoms. The symptoms are aggravated by activity. He has tried rest (pain medicine that uses for back pain.) for the symptoms. The treatment provided moderate relief. States doesn't know if injured   Back Pain  This is a chronic problem. The current episode started more than 1 year ago. The problem occurs intermittently. The problem has been waxing and waning since onset. The pain is present in the lumbar spine. The  quality of the pain is described as aching. The pain does not radiate. The pain is at a severity of 8/10. The symptoms are aggravated by position (certain tasks). Stiffness is present All day. Associated symptoms include numbness (in hands), tingling (in hands) and weakness. Pertinent negatives include no abdominal pain, bladder incontinence, bowel incontinence, chest pain, dysuria, fever, headaches, leg pain, paresis, paresthesias, pelvic pain, perianal numbness or weight loss. Risk factors include obesity. He has tried muscle relaxant and analgesics for the symptoms. The treatment provided moderate relief.     Review of Systems   Constitutional:  Negative for chills, diaphoresis, fatigue, fever and weight loss.   HENT:  Negative for ear pain, hearing loss, nosebleeds, tinnitus, trouble swallowing and voice change.    Eyes:  Negative for photophobia, pain, discharge and visual disturbance.   Respiratory:  Negative for chest tightness and shortness of breath.    Cardiovascular:  Negative for chest pain, palpitations and leg swelling.   Gastrointestinal:  Positive for constipation (LBM today). Negative for abdominal pain, bowel incontinence, diarrhea, nausea and vomiting.   Endocrine: Negative for polydipsia, polyphagia and polyuria.   Genitourinary:  Positive for erectile dysfunction. Negative for bladder incontinence, difficulty urinating, dysuria and pelvic pain.   Musculoskeletal:  Positive for back pain and myalgias. Negative for gait problem, leg pain, neck pain, neck stiffness and stiffness.   Integumentary:  Negative for itching and rash.   Allergic/Immunologic: Negative for frequent infections.   Neurological:  Positive for tingling (in hands), weakness and numbness (in hands). Negative for dizziness, vertigo, tremors, seizures, syncope, speech difficulty, headaches, paresthesias and memory loss.   Hematological:  Does not bruise/bleed easily.   Psychiatric/Behavioral:  Negative for dysphoric mood,  self-injury, sleep disturbance and suicidal ideas.      Past Medical History:   Diagnosis Date    Diabetes mellitus     Hypertension     Mixed hyperlipidemia      History reviewed. No pertinent surgical history.  Social History     Socioeconomic History    Marital status:    Tobacco Use    Smoking status: Never    Smokeless tobacco: Never   Substance and Sexual Activity    Alcohol use: No    Drug use: No     Social Determinants of Health     Financial Resource Strain: Low Risk  (10/6/2023)    Overall Financial Resource Strain (CARDIA)     Difficulty of Paying Living Expenses: Not very hard   Food Insecurity: No Food Insecurity (10/6/2023)    Hunger Vital Sign     Worried About Running Out of Food in the Last Year: Never true     Ran Out of Food in the Last Year: Never true   Transportation Needs: No Transportation Needs (10/6/2023)    PRAPARE - Transportation     Lack of Transportation (Medical): No     Lack of Transportation (Non-Medical): No   Physical Activity: Sufficiently Active (10/6/2023)    Exercise Vital Sign     Days of Exercise per Week: 7 days     Minutes of Exercise per Session: 110 min   Stress: No Stress Concern Present (10/6/2023)    [employer] [place] [employer] - Occupational Stress Questionnaire     Feeling of Stress : Only a little   Housing Stability: High Risk (10/6/2023)    Housing Stability Vital Sign     Unable to Pay for Housing in the Last Year: No     Number of Places Lived in the Last Year: 3     Unstable Housing in the Last Year: No     Review of patient's allergies indicates:   Allergen Reactions    Levonorgestrel-ethinyl estrad      Other reaction(s): sinus, running nose, cough, etc.    Morphine      Other reaction(s): Not available     Patient Active Problem List   Diagnosis    Chronic right-sided low back pain with right-sided sciatica    Type 2 diabetes mellitus without complication, without long-term current use of insulin    Constipation    Essential  "hypertension    Hyperlipidemia    Osteoarthritis of spine with radiculopathy, lumbar region    Polyneuropathy    Decreased ROM of right shoulder    Decreased strength of upper extremity    Poor posture    Erectile dysfunction    Vitamin B12 deficiency    Class 2 severe obesity due to excess calories with serious comorbidity and body mass index (BMI) of 38.0 to 38.9 in adult    History of malignant carcinoid tumor of rectum    Chronic right shoulder pain        Objective   Vitals:    08/29/24 1431   BP: 114/74   BP Location: Right arm   Patient Position: Standing   BP Method: Medium (Automatic)   Pulse: 98   Resp: 16   SpO2: 100%   Weight: 107.8 kg (237 lb 10.5 oz)   Height: 5' 11" (1.803 m)       Physical Exam  Constitutional:       General: He is not in acute distress.     Appearance: Normal appearance. He is obese. He is not ill-appearing.   HENT:      Head: Normocephalic and atraumatic.      Right Ear: Tympanic membrane, ear canal and external ear normal.      Left Ear: Tympanic membrane, ear canal and external ear normal.      Nose: Nose normal.      Mouth/Throat:      Mouth: Mucous membranes are moist.      Pharynx: Oropharynx is clear. No oropharyngeal exudate or posterior oropharyngeal erythema.   Eyes:      Extraocular Movements: Extraocular movements intact.      Conjunctiva/sclera: Conjunctivae normal.      Pupils: Pupils are equal, round, and reactive to light.   Cardiovascular:      Rate and Rhythm: Normal rate and regular rhythm.      Pulses: Normal pulses.      Heart sounds: Normal heart sounds.   Pulmonary:      Effort: Pulmonary effort is normal. No respiratory distress.      Breath sounds: Normal breath sounds.   Abdominal:      General: Bowel sounds are normal.      Palpations: Abdomen is soft.   Musculoskeletal:      Right shoulder: Tenderness present. No effusion, laceration or crepitus. Normal range of motion. Normal strength.      Left shoulder: Normal.      Cervical back: Normal, normal range " of motion and neck supple.      Thoracic back: Normal.      Lumbar back: Tenderness (lower left to midline area) present. Decreased range of motion (mild limitation with rotation and flexion).      Right lower leg: No edema.      Left lower leg: No edema.   Skin:     General: Skin is warm and dry.      Capillary Refill: Capillary refill takes less than 2 seconds.      Findings: No rash.   Neurological:      Mental Status: He is alert and oriented to person, place, and time.   Psychiatric:         Attention and Perception: Attention normal.         Mood and Affect: Mood and affect normal.         Speech: Speech normal.         Behavior: Behavior normal. Behavior is cooperative.         Thought Content: Thought content normal.            Assessment and Plan     1. Chronic right shoulder pain  -     cyclobenzaprine (FLEXERIL) 10 MG tablet; Take 1 tablet (10 mg total) by mouth 3 (three) times daily as needed for Muscle spasms.  Dispense: 90 tablet; Refill: 0    2. Chronic bilateral low back pain without sciatica  -     cyclobenzaprine (FLEXERIL) 10 MG tablet; Take 1 tablet (10 mg total) by mouth 3 (three) times daily as needed for Muscle spasms.  Dispense: 90 tablet; Refill: 0        -     Stretch daily: take a few minutes each day.  Avoid sitting with poor posture. Remember to get up and walk around if sit for long periods of time.  Lift correctly using proper body mechanics.  Reduce pressure on your back while you sleep by slightly elevating your knees with a pillow (put between knees if side sleeper). Watch your weight: extra weight puts strain on the back.              3. Erectile dysfunction, unspecified erectile dysfunction type  -     Ambulatory referral/consult to Urology; Future; Expected date: 09/05/2024    4. Type 2 diabetes mellitus without complication, without long-term current use of insulin        -     Continue current medical regimen.    5. Essential hypertension         -     Stable. Continue current  medical regimen.    6. Body mass index (BMI) 33.0-33.9, adult        -      Patient has lost 35 pounds since February.  Recommend eating at regular intervals, a balanced meal, that include lean protein (as in  turkey, chicken, fish), whole grains, fruit and vegetables without added sugar. Avoid fried and fast foods. Limit sodium (salt) intake. Stay hydrated; avoid sodas and sugary drinks.  Exercise regimen, as in walking, 3 - 4 times per week, for at least 30- 45 minutes; increase as tolerated.          Current Outpatient Medications on File Prior to Visit   Medication Sig Dispense Refill    amitriptyline (ELAVIL) 25 MG tablet TAKE 1 TABLET nightly for back pain and sleep 90 tablet 3    atorvastatin (LIPITOR) 40 MG tablet TAKE 1 TABLET BY MOUTH IN THE EVENING FOR HIGH CHOLESTEROL 90 tablet 3    blood sugar diagnostic Strp Check blood sugar daily before meals (goal ) 100 each 3    blood-glucose meter kit Check blood sugar 3 times daily before meals (goal ) 1 each 0    cloNIDine (CATAPRES) 0.2 MG tablet Take 1 tablet by mouth once daily.      cyclobenzaprine (FLEXERIL) 10 MG tablet Take 1 tablet (10 mg total) by mouth 3 (three) times daily. 90 tablet 6    dicyclomine (BENTYL) 20 mg tablet Take 1 tablet (20 mg total) by mouth 3 (three) times daily as needed (abdominal pain). 60 tablet 2    diltiaZEM (DILACOR XR) 120 MG CDCR TAKE 1 CAPSULE BY MOUTH ONCE DAILY FOR HIGH BLOOD PRESSURE 90 capsule 3    DULoxetine (CYMBALTA) 60 MG capsule Take 1 capsule by mouth once daily 90 capsule 2    empagliflozin (JARDIANCE) 10 mg tablet Take 1 tablet (10 mg total) by mouth once daily. FOR DIABETES 90 tablet 0    famotidine (PEPCID) 40 MG tablet TAKE 1 TABLET BY MOUTH ONCE NIGHTLY AS NEEDED FOR HEART BURN 90 tablet 3    furosemide (LASIX) 40 MG tablet TAKE 1 TABLET BY MOUTH ONCE DAILY WATER  PILL 90 tablet 2    lancets Misc Check blood sugar daily before meals (goal ) 100 each 3    linaCLOtide (LINZESS) 290 mcg Cap  "capsule Take 1 capsule (290 mcg total) by mouth before breakfast. constipation 30 capsule 6    lisinopriL-hydrochlorothiazide (PRINZIDE,ZESTORETIC) 20-25 mg Tab Take 1 tablet by mouth once daily 90 tablet 3    meclizine (ANTIVERT) 25 mg tablet Take 1 tablet (25 mg total) by mouth 3 (three) times daily as needed for Dizziness. 15 tablet 0    metFORMIN (GLUCOPHAGE) 1000 MG tablet TAKE 1 TABLET BY MOUTH TWICE DAILY WITH MEALS 180 tablet 1    omeprazole (PRILOSEC) 40 MG capsule Take 1 capsule (40 mg total) by mouth daily as needed (heartburn). 90 capsule 3    paroxetine (PAXIL) 40 MG tablet Take 40 mg by mouth.      pen needle, diabetic (BD ULTRA-FINE SHORT PEN NEEDLE) 31 gauge x 5/16" Ndle USE 1 PEN NEEDLE TO INJECT INSULIN ONCE DAILY. 100 each 0    potassium chloride (KLOR-CON) 8 MEQ TbSR Take 1 tablet by mouth once daily.      pregabalin (LYRICA) 75 MG capsule Take 1 capsule by mouth twice daily 60 capsule 2    semaglutide (OZEMPIC) 1 mg/dose (4 mg/3 mL) Inject 1 mg into the skin every 7 days. 3 mL 0    sucralfate (CARAFATE) 1 gram tablet TAKE 1 TABLET BY MOUTH 4 TIMES DAILY BEFORE MEAL(S) AND NIGHTLY (LINES THE STOMACH)      ergocalciferol (ERGOCALCIFEROL) 50,000 unit Cap Take 1 capsule by mouth every 7 days. (Patient not taking: Reported on 8/29/2024)      oxyCODONE-acetaminophen (PERCOCET) 5-325 mg per tablet Take 1 tablet by mouth every 8 (eight) hours as needed for Pain. (Patient not taking: Reported on 8/29/2024) 15 tablet 0     No current facility-administered medications on file prior to visit.          Follow up for visit with Orthopedics .    Lori A. Stephens Sandifer, FNP-C    "

## 2024-08-29 NOTE — TELEPHONE ENCOUNTER
Care Due:                  Date            Visit Type   Department     Provider  --------------------------------------------------------------------------------                                EP -                              PRIMARY      [facility] PRIMARY  Last Visit: 03-      CARE ([facility])   CARE           TIGIST HICKMAN  Next Visit: None Scheduled  None         None Found                                                            Last  Test          Frequency    Reason                     Performed    Due Date  --------------------------------------------------------------------------------    CMP.........  12 months..  atorvastatin.............  09- 09-    Health [facility] Embedded Care Due Messages. Reference number: [id number].   8/29/2024 9:29:55 AM ALFONSOT

## 2024-09-04 RX ORDER — CYCLOBENZAPRINE HCL 10 MG
10 TABLET ORAL 3 TIMES DAILY
Qty: 90 TABLET | Refills: 0 | Status: SHIPPED | OUTPATIENT
Start: 2024-09-04

## 2024-09-09 DIAGNOSIS — E11.9 TYPE 2 DIABETES MELLITUS WITHOUT COMPLICATION, WITHOUT LONG-TERM CURRENT USE OF INSULIN: ICD-10-CM

## 2024-09-10 RX ORDER — EMPAGLIFLOZIN 10 MG/1
TABLET, FILM COATED ORAL
Qty: 90 TABLET | Refills: 1 | Status: SHIPPED | OUTPATIENT
Start: 2024-09-10

## 2024-09-10 NOTE — TELEPHONE ENCOUNTER
Refill Decision Note   Haseeb Reilly  is requesting a refill authorization.  Brief Assessment and Rationale for Refill:  Approve     Medication Therapy Plan:        Comments:     Note composed:9:48 AM 09/10/2024

## 2024-09-10 NOTE — TELEPHONE ENCOUNTER
No care due was identified.  [facility] Embedded Care Due Messages. Reference number: [id number].   9/09/2024 9:05:33 PM CDT

## 2024-09-20 ENCOUNTER — OFFICE VISIT (OUTPATIENT)
Dept: PRIMARY CARE CLINIC | Facility: CLINIC | Age: 54
End: 2024-09-20
Payer: MEDICARE

## 2024-09-20 VITALS
OXYGEN SATURATION: 98 % | DIASTOLIC BLOOD PRESSURE: 87 MMHG | HEART RATE: 98 BPM | SYSTOLIC BLOOD PRESSURE: 129 MMHG | WEIGHT: 241.94 LBS | BODY MASS INDEX: 33.87 KG/M2 | HEIGHT: 71 IN

## 2024-09-20 DIAGNOSIS — M25.511 CHRONIC RIGHT SHOULDER PAIN: Primary | ICD-10-CM

## 2024-09-20 DIAGNOSIS — G89.29 CHRONIC RIGHT SHOULDER PAIN: Primary | ICD-10-CM

## 2024-09-20 PROCEDURE — 99999 PR PBB SHADOW E&M-EST. PATIENT-LVL V: CPT | Mod: PBBFAC,,, | Performed by: FAMILY MEDICINE
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
## 2024-09-25 NOTE — PROGRESS NOTES
CHW - Case Closure    This Community Health Worker spoke to patient via telephone today.   Pt/Caregiver reported: Informed CHW that he will contact her when he receives his award letter.  Pt/Caregiver denied any additional needs at this time and agrees with episode closure at this time.  Provided patient with Community Health Worker's contact information and encouraged him/her to contact this Community Health Worker if additional needs arise.      
home w/ parent

## 2024-10-04 DIAGNOSIS — K59.04 CHRONIC IDIOPATHIC CONSTIPATION: ICD-10-CM

## 2024-10-04 DIAGNOSIS — K59.00 CONSTIPATION, UNSPECIFIED CONSTIPATION TYPE: ICD-10-CM

## 2024-10-04 NOTE — TELEPHONE ENCOUNTER
No care due was identified.  [facility] Embedded Care Due Messages. Reference number: [id number].   10/04/2024 6:05:57 AM CDT

## 2024-10-08 RX ORDER — LINACLOTIDE 290 UG/1
CAPSULE, GELATIN COATED ORAL
Qty: 30 CAPSULE | Refills: 0 | Status: SHIPPED | OUTPATIENT
Start: 2024-10-08

## 2024-10-16 ENCOUNTER — TELEPHONE (OUTPATIENT)
Dept: PRIMARY CARE CLINIC | Facility: CLINIC | Age: 54
End: 2024-10-16
Payer: MEDICARE

## 2024-10-16 NOTE — TELEPHONE ENCOUNTER
Called pt to let him know that his referral is in , pt will need to wait til her gets a call for his appt.

## 2024-10-25 DIAGNOSIS — G62.9 POLYNEUROPATHY: ICD-10-CM

## 2024-10-25 DIAGNOSIS — E11.9 TYPE 2 DIABETES MELLITUS WITHOUT COMPLICATION, WITHOUT LONG-TERM CURRENT USE OF INSULIN: ICD-10-CM

## 2024-10-26 RX ORDER — SEMAGLUTIDE 1.34 MG/ML
1 INJECTION, SOLUTION SUBCUTANEOUS
Qty: 9 ML | Refills: 0 | Status: SHIPPED | OUTPATIENT
Start: 2024-10-26

## 2024-10-26 RX ORDER — METFORMIN HYDROCHLORIDE 1000 MG/1
TABLET ORAL
Qty: 180 TABLET | Refills: 0 | Status: SHIPPED | OUTPATIENT
Start: 2024-10-26

## 2024-10-30 RX ORDER — PREGABALIN 75 MG/1
75 CAPSULE ORAL 2 TIMES DAILY
Qty: 60 CAPSULE | Refills: 2 | Status: SHIPPED | OUTPATIENT
Start: 2024-10-30

## 2024-10-30 RX ORDER — AMITRIPTYLINE HYDROCHLORIDE 25 MG/1
TABLET, FILM COATED ORAL
Qty: 90 TABLET | Refills: 3 | Status: SHIPPED | OUTPATIENT
Start: 2024-10-30

## 2024-11-09 DIAGNOSIS — K59.00 CONSTIPATION, UNSPECIFIED CONSTIPATION TYPE: ICD-10-CM

## 2024-11-09 DIAGNOSIS — K59.04 CHRONIC IDIOPATHIC CONSTIPATION: ICD-10-CM

## 2024-11-09 NOTE — TELEPHONE ENCOUNTER
No care due was identified.  Health [facility] Embedded Care Due Messages. Reference number: [id number].   11/09/2024 7:04:06 AM CST

## 2024-11-12 RX ORDER — LINACLOTIDE 290 UG/1
CAPSULE, GELATIN COATED ORAL
Qty: 90 CAPSULE | Refills: 1 | Status: SHIPPED | OUTPATIENT
Start: 2024-11-12

## 2024-11-13 DIAGNOSIS — G89.29 CHRONIC RIGHT-SIDED LOW BACK PAIN WITH RIGHT-SIDED SCIATICA: ICD-10-CM

## 2024-11-13 DIAGNOSIS — M54.41 CHRONIC RIGHT-SIDED LOW BACK PAIN WITH RIGHT-SIDED SCIATICA: ICD-10-CM

## 2024-11-13 RX ORDER — CYCLOBENZAPRINE HCL 10 MG
10 TABLET ORAL 3 TIMES DAILY
Qty: 90 TABLET | Refills: 0 | Status: SHIPPED | OUTPATIENT
Start: 2024-11-13

## 2024-11-13 NOTE — TELEPHONE ENCOUNTER
No care due was identified.  Health [facility] Embedded Care Due Messages. Reference number: [id number].   11/13/2024 2:55:31 PM CST   Burow's Advancement Flap Text: Given the location of the defect and the proximity to free margins a Burow's advancement flap was deemed most appropriate.  Using a sterile surgical marker, the appropriate advancement flap was drawn incorporating the defect and placing the expected incisions within the relaxed skin tension lines where possible.    The area thus outlined was incised deep to adipose tissue with a #15c scalpel blade.  The skin margins were undermined to an appropriate distance in all directions utilizing iris scissors.

## 2024-11-25 DIAGNOSIS — G89.29 CHRONIC RIGHT-SIDED LOW BACK PAIN WITH RIGHT-SIDED SCIATICA: ICD-10-CM

## 2024-11-25 DIAGNOSIS — M54.41 CHRONIC RIGHT-SIDED LOW BACK PAIN WITH RIGHT-SIDED SCIATICA: ICD-10-CM

## 2024-11-25 NOTE — TELEPHONE ENCOUNTER
Care Due:                  Date            Visit Type   Department     Provider  --------------------------------------------------------------------------------                                EP -                              PRIMARY      [facility] PRIMARY  Last Visit: 09-      CARE ([facility])   CARE           TIGIST HICKMAN  Next Visit: None Scheduled  None         None Found                                                            Last  Test          Frequency    Reason                     Performed    Due Date  --------------------------------------------------------------------------------    HBA1C.......  6 months...  JARDIANCE, metFORMIN,      08- 02-                             semaglutide..............    [employer] Embedded Care Due Messages. Reference number: [id number].   11/25/2024 2:34:23 PM CST

## 2024-11-27 RX ORDER — CYCLOBENZAPRINE HCL 10 MG
10 TABLET ORAL 3 TIMES DAILY
Qty: 90 TABLET | Refills: 0 | Status: SHIPPED | OUTPATIENT
Start: 2024-11-27

## 2025-02-13 DIAGNOSIS — E11.9 TYPE 2 DIABETES MELLITUS WITHOUT COMPLICATION, WITHOUT LONG-TERM CURRENT USE OF INSULIN: ICD-10-CM

## 2025-02-13 RX ORDER — METFORMIN HYDROCHLORIDE 1000 MG/1
TABLET ORAL
Qty: 180 TABLET | Refills: 0 | Status: SHIPPED | OUTPATIENT
Start: 2025-02-13

## 2025-02-13 RX ORDER — EMPAGLIFLOZIN 10 MG/1
TABLET, FILM COATED ORAL
Qty: 30 TABLET | Refills: 0 | Status: SHIPPED | OUTPATIENT
Start: 2025-02-13

## 2025-02-13 NOTE — TELEPHONE ENCOUNTER
Care Due:                  Date            Visit Type   Department     Provider  --------------------------------------------------------------------------------                                EP -                              PRIMARY      [facility] PRIMARY  Last Visit: 09-      CARE ([facility])   CARE           TIGIST HICKMAN  Next Visit: None Scheduled  None         None Found                                                            Last  Test          Frequency    Reason                     Performed    Due Date  --------------------------------------------------------------------------------    CMP.........  12 months..  DULoxetine, JARDIANCE,     09- 09-                             atorvastatin,                             lisinopriL-hydrochlorothi                             azide, metFORMIN.........    HBA1C.......  6 months...  JARDIANCE, metFORMIN,      08- 02-                             semaglutide..............    [employer] Embedded Care Due Messages. Reference number: [id number].   2/13/2025 9:16:16 AM CST

## 2025-02-13 NOTE — TELEPHONE ENCOUNTER
Provider Staff:  Action required for this patient    Requires appointment   Requires labs      Please see care gap opportunities below in Care Due Message.    Thanks!  [facility]     Appointments      Date Provider   Last Visit   9/20/2024 Aman Patino MD   Next Visit   Visit date not found Aman Patino MD     Refill Decision Note   Haseeb Reilly  is requesting a refill authorization.  Brief Assessment and Rationale for Refill:  Approve     Medication Therapy Plan:        Comments:     Note composed:1:34 PM 02/13/2025

## 2025-02-15 DIAGNOSIS — E11.9 TYPE 2 DIABETES MELLITUS WITHOUT COMPLICATION, WITHOUT LONG-TERM CURRENT USE OF INSULIN: ICD-10-CM

## 2025-02-16 RX ORDER — EMPAGLIFLOZIN 10 MG/1
TABLET, FILM COATED ORAL
Qty: 90 TABLET | Refills: 3 | OUTPATIENT
Start: 2025-02-16

## 2025-02-16 NOTE — TELEPHONE ENCOUNTER
No care due was identified.  Health [facility] Embedded Care Due Messages. Reference number: [id number].   2/15/2025 9:28:10 PM CST

## 2025-02-16 NOTE — TELEPHONE ENCOUNTER
Refill Decision Note   Haseeb Reilly  is requesting a refill authorization.  Brief Assessment and Rationale for Refill:  Quick Discontinue     Medication Therapy Plan: duplicate Receipt confirmed by pharmacy (2/13/2025  1:35 PM CST)      Comments:     Note composed:3:05 PM 02/16/2025

## 2025-02-20 DIAGNOSIS — M54.41 CHRONIC RIGHT-SIDED LOW BACK PAIN WITH RIGHT-SIDED SCIATICA: ICD-10-CM

## 2025-02-20 DIAGNOSIS — G89.29 CHRONIC RIGHT-SIDED LOW BACK PAIN WITH RIGHT-SIDED SCIATICA: ICD-10-CM

## 2025-02-20 DIAGNOSIS — G62.9 POLYNEUROPATHY: ICD-10-CM

## 2025-02-20 NOTE — TELEPHONE ENCOUNTER
No care due was identified.  [facility] Embedded Care Due Messages. Reference number: [id number].   2/20/2025 8:56:55 AM CST

## 2025-02-21 RX ORDER — CYCLOBENZAPRINE HCL 10 MG
10 TABLET ORAL 3 TIMES DAILY
Qty: 90 TABLET | Refills: 0 | Status: SHIPPED | OUTPATIENT
Start: 2025-02-21

## 2025-02-21 RX ORDER — PREGABALIN 75 MG/1
75 CAPSULE ORAL 2 TIMES DAILY
Qty: 60 CAPSULE | Refills: 0 | Status: SHIPPED | OUTPATIENT
Start: 2025-02-21

## 2025-02-26 DIAGNOSIS — E11.9 TYPE 2 DIABETES MELLITUS WITHOUT COMPLICATION: ICD-10-CM

## 2025-03-12 DIAGNOSIS — E11.9 TYPE 2 DIABETES MELLITUS WITHOUT COMPLICATION, WITHOUT LONG-TERM CURRENT USE OF INSULIN: ICD-10-CM

## 2025-03-12 NOTE — TELEPHONE ENCOUNTER
No care due was identified.  [facility] Embedded Care Due Messages. Reference number: [id number].   3/12/2025 6:04:18 AM CDT

## 2025-03-12 NOTE — TELEPHONE ENCOUNTER
Refill Routing Note   Medication(s) are not appropriate for processing by [facility] for the following reason(s):        Required labs outdated: A1C & CMP    [facility] action(s):  Defer             Appointments  past 12m or future 3m with PCP    Date Provider   Last Visit   9/20/2024 Aman Patino MD   Next Visit   Visit date not found Aman Patino MD   ED visits in past 90 days: 0        Note composed:1:14 PM 03/12/2025

## 2025-03-14 RX ORDER — EMPAGLIFLOZIN 10 MG/1
TABLET, FILM COATED ORAL
Qty: 30 TABLET | Refills: 0 | Status: SHIPPED | OUTPATIENT
Start: 2025-03-14

## 2025-04-25 DIAGNOSIS — G89.29 CHRONIC RIGHT-SIDED LOW BACK PAIN WITH RIGHT-SIDED SCIATICA: ICD-10-CM

## 2025-04-25 DIAGNOSIS — G62.9 POLYNEUROPATHY: ICD-10-CM

## 2025-04-25 DIAGNOSIS — M54.41 CHRONIC RIGHT-SIDED LOW BACK PAIN WITH RIGHT-SIDED SCIATICA: ICD-10-CM

## 2025-04-25 RX ORDER — CYCLOBENZAPRINE HCL 10 MG
10 TABLET ORAL 3 TIMES DAILY
Qty: 90 TABLET | Refills: 0 | Status: SHIPPED | OUTPATIENT
Start: 2025-04-25

## 2025-04-25 RX ORDER — PREGABALIN 75 MG/1
75 CAPSULE ORAL 2 TIMES DAILY
Qty: 60 CAPSULE | Refills: 3 | Status: SHIPPED | OUTPATIENT
Start: 2025-04-25

## 2025-04-25 RX ORDER — AMITRIPTYLINE HYDROCHLORIDE 25 MG/1
TABLET, FILM COATED ORAL
Qty: 90 TABLET | Refills: 3 | Status: SHIPPED | OUTPATIENT
Start: 2025-04-25

## 2025-04-28 DIAGNOSIS — Z00.00 ENCOUNTER FOR MEDICARE ANNUAL WELLNESS EXAM: ICD-10-CM

## 2025-04-30 ENCOUNTER — OFFICE VISIT (OUTPATIENT)
Dept: PRIMARY CARE CLINIC | Facility: CLINIC | Age: 55
End: 2025-04-30
Payer: MEDICARE

## 2025-04-30 VITALS
DIASTOLIC BLOOD PRESSURE: 82 MMHG | SYSTOLIC BLOOD PRESSURE: 130 MMHG | BODY MASS INDEX: 33.14 KG/M2 | HEIGHT: 71 IN | HEART RATE: 88 BPM | WEIGHT: 236.69 LBS

## 2025-04-30 DIAGNOSIS — G89.29 CHRONIC RIGHT SHOULDER PAIN: ICD-10-CM

## 2025-04-30 DIAGNOSIS — R12 HEARTBURN: ICD-10-CM

## 2025-04-30 DIAGNOSIS — Z76.0 ENCOUNTER FOR MEDICATION REFILL: Primary | ICD-10-CM

## 2025-04-30 DIAGNOSIS — N52.9 ERECTILE DYSFUNCTION, UNSPECIFIED ERECTILE DYSFUNCTION TYPE: ICD-10-CM

## 2025-04-30 DIAGNOSIS — E11.9 TYPE 2 DIABETES MELLITUS WITHOUT COMPLICATION: ICD-10-CM

## 2025-04-30 DIAGNOSIS — I10 ESSENTIAL HYPERTENSION: ICD-10-CM

## 2025-04-30 DIAGNOSIS — M25.511 CHRONIC RIGHT SHOULDER PAIN: ICD-10-CM

## 2025-04-30 PROCEDURE — 3044F HG A1C LEVEL LT 7.0%: CPT | Mod: CPTII,S$GLB,,

## 2025-04-30 PROCEDURE — 1159F MED LIST DOCD IN RCRD: CPT | Mod: CPTII,S$GLB,,

## 2025-04-30 PROCEDURE — 99215 OFFICE O/P EST HI 40 MIN: CPT | Mod: PBBFAC,PN

## 2025-04-30 PROCEDURE — 3079F DIAST BP 80-89 MM HG: CPT | Mod: CPTII,S$GLB,,

## 2025-04-30 PROCEDURE — 99214 OFFICE O/P EST MOD 30 MIN: CPT | Mod: S$GLB,,,

## 2025-04-30 PROCEDURE — 99999 PR PBB SHADOW E&M-EST. PATIENT-LVL V: CPT | Mod: PBBFAC,,,

## 2025-04-30 PROCEDURE — 3075F SYST BP GE 130 - 139MM HG: CPT | Mod: CPTII,S$GLB,,

## 2025-04-30 PROCEDURE — 3008F BODY MASS INDEX DOCD: CPT | Mod: CPTII,S$GLB,,

## 2025-04-30 PROCEDURE — 4010F ACE/ARB THERAPY RXD/TAKEN: CPT | Mod: CPTII,S$GLB,,

## 2025-04-30 PROCEDURE — 1160F RVW MEDS BY RX/DR IN RCRD: CPT | Mod: CPTII,S$GLB,,

## 2025-04-30 RX ORDER — FUROSEMIDE 40 MG/1
40 TABLET ORAL DAILY
Qty: 90 TABLET | Refills: 2 | Status: SHIPPED | OUTPATIENT
Start: 2025-04-30

## 2025-04-30 RX ORDER — FAMOTIDINE 40 MG/1
40 TABLET, FILM COATED ORAL NIGHTLY PRN
Qty: 90 TABLET | Refills: 3 | Status: SHIPPED | OUTPATIENT
Start: 2025-04-30

## 2025-04-30 NOTE — PROGRESS NOTES
"Subjective     Patient ID: Haseeb Reilly is a 54 y.o. male.    Chief Complaint: Back Pain    History of Present Illness    CHIEF COMPLAINT:  Patient presents today for medication refills and referrals.    MUSCULOSKELETAL:  He reports right shoulder pain with limited range of motion, experiencing difficulty with activities such as picking up a ball, raising his arm, or reaching behind his back. The pain extends from the anterior shoulder to the scapula with occasional popping sensations during movement. He experiences numbness in his arms when lying on either side, severe enough to wake him from sleep. He also reports back pain that worsens with movement, particularly with certain motions causing a locking sensation and muscle spasms described as a "charley horse." Pain is exacerbated by mechanical work, especially in cold conditions, with severe limitation in mobility the following day. Back pain has increased despite ongoing weight loss efforts. Bicycle riding has not provided relief.    WEIGHT MANAGEMENT:  He reports significant weight loss from 320 lbs to current weight of 235 lbs, with a goal to lose an additional 25 lbs. His clothing size has decreased significantly, with extra-large sizes now being too large.    GASTROINTESTINAL:  He reports chronic diarrhea and experiences severe heartburn if medication is missed for 2-3 days, preventing him from drinking water. He denies nausea, vomiting, and constipation.      ROS:  General: -fever, -chills, -fatigue, -weight gain, +weight loss  Eyes: -vision changes, -redness, -discharge  ENT: -ear pain, -nasal congestion, -sore throat  Cardiovascular: -chest pain, -palpitations, -lower extremity edema  Respiratory: -cough, -shortness of breath  Gastrointestinal: -abdominal pain, -nausea, -vomiting, +diarrhea, -constipation, -blood in stool, +heartburn  Genitourinary: -dysuria, -hematuria, -frequency, +erectile dysfunction  Musculoskeletal: -joint pain, -muscle pain, " "+pain with movement, +back pain, +body aches, +limited movement, +muscle spasms, +muscle tension  Skin: -rash, -lesion  Neurological: -headache, -dizziness, +numbness, -tingling  Psychiatric: -anxiety, -depression, -sleep difficulty         Past Medical History:   Diagnosis Date    Diabetes mellitus     Hypertension     Mixed hyperlipidemia      History reviewed. No pertinent surgical history.  Social History[1]  Review of patient's allergies indicates:   Allergen Reactions    Levonorgestrel-ethinyl estrad      Other reaction(s): sinus, running nose, cough, etc.    Morphine      Other reaction(s): Not available     Problem List[2]       Objective   Vitals:    04/30/25 1001   BP: 130/82   BP Location: Left arm   Pulse: 88   Weight: 107.3 kg (236 lb 10.6 oz)   Height: 5' 11" (1.803 m)       Physical Exam  Constitutional:       General: He is not in acute distress.     Appearance: Normal appearance. He is obese.   HENT:      Head: Normocephalic and atraumatic.      Right Ear: Tympanic membrane, ear canal and external ear normal.      Left Ear: Tympanic membrane, ear canal and external ear normal.      Nose: Nose normal.      Mouth/Throat:      Mouth: Mucous membranes are moist.      Pharynx: Oropharynx is clear. No oropharyngeal exudate or posterior oropharyngeal erythema.   Eyes:      Extraocular Movements: Extraocular movements intact.      Conjunctiva/sclera: Conjunctivae normal.      Pupils: Pupils are equal, round, and reactive to light.   Cardiovascular:      Rate and Rhythm: Normal rate and regular rhythm.      Pulses: Normal pulses.      Heart sounds: Normal heart sounds.   Pulmonary:      Effort: Pulmonary effort is normal. No respiratory distress.      Breath sounds: Normal breath sounds.   Abdominal:      General: Bowel sounds are normal.      Palpations: Abdomen is soft.   Musculoskeletal:         General: Normal range of motion.      Cervical back: Normal range of motion and neck supple.      Right lower " leg: No edema.      Left lower leg: No edema.   Skin:     General: Skin is warm and dry.      Capillary Refill: Capillary refill takes less than 2 seconds.      Findings: No rash.   Neurological:      Mental Status: He is alert and oriented to person, place, and time.      Gait: Gait normal.   Psychiatric:         Mood and Affect: Mood normal.         Behavior: Behavior normal.            Assessment and Plan     1. Encounter for medication refill  -     furosemide (LASIX) 40 MG tablet; Take 1 tablet (40 mg total) by mouth once daily.  Dispense: 90 tablet; Refill: 2  -     famotidine (PEPCID) 40 MG tablet; Take 1 tablet (40 mg total) by mouth nightly as needed for Heartburn.  Dispense: 90 tablet; Refill: 3    2. Essential hypertension  -     furosemide (LASIX) 40 MG tablet; Take 1 tablet (40 mg total) by mouth once daily.  Dispense: 90 tablet; Refill: 2  Controlled.with current regimen.    3. Type 2 diabetes mellitus without complication  -     Comprehensive Metabolic Panel; Future; Expected date: 04/30/2025  -     Hemoglobin A1c (Diabetes); Future; Expected date: 04/30/2025  -     Ambulatory referral/consult to Podiatry; Future; Expected date: 05/07/2025  Last hemoglobin A1C 5.5.    4. Chronic right shoulder pain  Continue current regimen.    5. Heartburn  -     famotidine (PEPCID) 40 MG tablet; Take 1 tablet (40 mg total) by mouth nightly as needed for Heartburn.  Dispense: 90 tablet; Refill: 3    6. Erectile dysfunction, unspecified erectile dysfunction type  -     Ambulatory referral/consult to Urology; Future; Expected date: 05/07/2025        Medication List with Changes/Refills   Current Medications    AMITRIPTYLINE (ELAVIL) 25 MG TABLET    TAKE 1 TABLET nightly for back pain and sleep    ATORVASTATIN (LIPITOR) 40 MG TABLET    TAKE 1 TABLET BY MOUTH IN THE EVENING FOR HIGH CHOLESTEROL    BLOOD SUGAR DIAGNOSTIC STRP    Check blood sugar daily before meals (goal )    BLOOD-GLUCOSE METER KIT    Check blood  "sugar 3 times daily before meals (goal )    CLONIDINE (CATAPRES) 0.2 MG TABLET    Take 1 tablet by mouth once daily.    CYCLOBENZAPRINE (FLEXERIL) 10 MG TABLET    Take 1 tablet (10 mg total) by mouth 3 (three) times daily as needed for Muscle spasms.    CYCLOBENZAPRINE (FLEXERIL) 10 MG TABLET    Take 1 tablet (10 mg total) by mouth 3 (three) times daily.    DICYCLOMINE (BENTYL) 20 MG TABLET    Take 1 tablet (20 mg total) by mouth 3 (three) times daily as needed (abdominal pain).    DILTIAZEM (DILACOR XR) 120 MG CDCR    TAKE 1 CAPSULE BY MOUTH ONCE DAILY FOR HIGH BLOOD PRESSURE    DULOXETINE (CYMBALTA) 60 MG CAPSULE    Take 1 capsule by mouth once daily    ERGOCALCIFEROL (ERGOCALCIFEROL) 50,000 UNIT CAP    Take 1 capsule by mouth every 7 days.    JARDIANCE 10 MG TABLET    TAKE 1 TABLET BY MOUTH ONCE DAILY FOR DIABETES    LANCETS MISC    Check blood sugar daily before meals (goal )    LINACLOTIDE (LINZESS) 290 MCG CAP CAPSULE    TAKE 1 CAPSULE BY MOUTH ONCE DAILY BEFORE BREAKFAST FOR CONSTIPATION    LISINOPRIL-HYDROCHLOROTHIAZIDE (PRINZIDE,ZESTORETIC) 20-25 MG TAB    Take 1 tablet by mouth once daily    MECLIZINE (ANTIVERT) 25 MG TABLET    Take 1 tablet (25 mg total) by mouth 3 (three) times daily as needed for Dizziness.    METFORMIN (GLUCOPHAGE) 1000 MG TABLET    TAKE 1 TABLET BY MOUTH TWICE DAILY WITH MEALS    OMEPRAZOLE (PRILOSEC) 40 MG CAPSULE    Take 1 capsule (40 mg total) by mouth daily as needed (heartburn).    OXYCODONE-ACETAMINOPHEN (PERCOCET) 5-325 MG PER TABLET    Take 1 tablet by mouth every 8 (eight) hours as needed for Pain.    PAROXETINE (PAXIL) 40 MG TABLET    Take 40 mg by mouth.    PEN NEEDLE, DIABETIC (BD ULTRA-FINE SHORT PEN NEEDLE) 31 GAUGE X 5/16" NDLE    USE 1 PEN NEEDLE TO INJECT INSULIN ONCE DAILY.    POTASSIUM CHLORIDE (KLOR-CON) 8 MEQ TBSR    Take 1 tablet by mouth once daily.    PREGABALIN (LYRICA) 75 MG CAPSULE    Take 1 capsule (75 mg total) by mouth 2 (two) times daily.    " SEMAGLUTIDE (OZEMPIC) 1 MG/DOSE (4 MG/3 ML)    Inject 1 mg into the skin every 7 days.    SUCRALFATE (CARAFATE) 1 GRAM TABLET    TAKE 1 TABLET BY MOUTH 4 TIMES DAILY BEFORE MEAL(S) AND NIGHTLY (LINES THE STOMACH)   Changed and/or Refilled Medications    Modified Medication Previous Medication    FAMOTIDINE (PEPCID) 40 MG TABLET famotidine (PEPCID) 40 MG tablet       Take 1 tablet (40 mg total) by mouth nightly as needed for Heartburn.    TAKE 1 TABLET BY MOUTH ONCE NIGHTLY AS NEEDED FOR HEART BURN    FUROSEMIDE (LASIX) 40 MG TABLET furosemide (LASIX) 40 MG tablet       Take 1 tablet (40 mg total) by mouth once daily.    TAKE 1 TABLET BY MOUTH ONCE DAILY WATER  PILL          Follow up if symptoms worsen or fail to improve.    Lori A. Stephens Sandifer, FNP-C    This note was generated with the assistance of ambient listening technology. Verbal consent was obtained by the patient and accompanying visitor(s) for the recording of patient appointment to facilitate this note. I attest to having reviewed and edited the generated note for accuracy, though some syntax or spelling errors may persist. Please contact the author of this note for any clarification.           [1]   Social History  Socioeconomic History    Marital status:    Tobacco Use    Smoking status: Never    Smokeless tobacco: Never   Substance and Sexual Activity    Alcohol use: No    Drug use: No     Social Drivers of Health     Financial Resource Strain: High Risk (8/16/2024)    Received from [facility] SDOH Screening     In the past year, have you or any family members you live with been unable to get any of the following when you really needed them? choose all that apply.: Internet     In the past year, have you been unable to get any of the following when you really needed them? choose all that apply.: Internet     In the past year, have you or any family members you live with been unable to get any of the following when you really  needed them? choose all that apply.: Smartphone/tablet   Food Insecurity: High Risk (2/13/2025)    Received from [facility] SDOH Screening     In the past 2 months, did you or others you live with eat smaller meals or skip meals because you didn't have money for food?: Yes   Transportation Needs: No Transportation Needs (10/6/2023)    PRAPARE - Transportation     Lack of Transportation (Medical): No     Lack of Transportation (Non-Medical): No   Physical Activity: Sufficiently Active (10/6/2023)    Exercise Vital Sign     Days of Exercise per Week: 7 days     Minutes of Exercise per Session: 110 min   Stress: No Stress Concern Present (10/6/2023)    [employer] - Occupational Stress Questionnaire     Feeling of Stress : Only a little   Housing Stability: High Risk (2/13/2025)    Received from [facility] SDOH Screening     In the past year, have you been unable to get any of the following when you really needed them? choose all that apply.: Utilities (electric, gas, and water)     What is your living situation today?: I have a place to live today, but i am worried about losing it in the future   [2]   Patient Active Problem List  Diagnosis    Chronic right-sided low back pain with right-sided sciatica    Type 2 diabetes mellitus without complication, without long-term current use of insulin    Constipation    Essential hypertension    Hyperlipidemia    Osteoarthritis of spine with radiculopathy, lumbar region    Polyneuropathy    Decreased ROM of right shoulder    Decreased strength of upper extremity    Poor posture    Erectile dysfunction    Vitamin B12 deficiency    Class 2 severe obesity due to excess calories with serious comorbidity and body mass index (BMI) of 38.0 to 38.9 in adult    History of malignant carcinoid tumor of rectum    Chronic right shoulder pain

## 2025-05-01 ENCOUNTER — LAB VISIT (OUTPATIENT)
Dept: PRIMARY CARE CLINIC | Facility: CLINIC | Age: 55
End: 2025-05-01
Payer: MEDICARE

## 2025-05-01 DIAGNOSIS — E11.9 TYPE 2 DIABETES MELLITUS WITHOUT COMPLICATION: ICD-10-CM

## 2025-05-01 LAB
ALBUMIN SERPL BCP-MCNC: 3.2 G/DL (ref 3.5–5.2)
ALP SERPL-CCNC: 89 UNIT/L (ref 40–150)
ALT SERPL W/O P-5'-P-CCNC: 8 UNIT/L (ref 10–44)
ANION GAP (OHS): 11 MMOL/L (ref 8–16)
AST SERPL-CCNC: 14 UNIT/L (ref 11–45)
BILIRUB SERPL-MCNC: 0.5 MG/DL (ref 0.1–1)
BUN SERPL-MCNC: 13 MG/DL (ref 6–20)
CALCIUM SERPL-MCNC: 8.8 MG/DL (ref 8.7–10.5)
CHLORIDE SERPL-SCNC: 100 MMOL/L (ref 95–110)
CO2 SERPL-SCNC: 29 MMOL/L (ref 23–29)
CREAT SERPL-MCNC: 0.9 MG/DL (ref 0.5–1.4)
GFR SERPLBLD CREATININE-BSD FMLA CKD-EPI: >60 ML/MIN/1.73/M2
GLUCOSE SERPL-MCNC: 100 MG/DL (ref 70–110)
POTASSIUM SERPL-SCNC: 3.5 MMOL/L (ref 3.5–5.1)
PROT SERPL-MCNC: 7.2 GM/DL (ref 6–8.4)
SODIUM SERPL-SCNC: 140 MMOL/L (ref 136–145)

## 2025-05-01 PROCEDURE — 82040 ASSAY OF SERUM ALBUMIN: CPT

## 2025-05-08 ENCOUNTER — PATIENT MESSAGE (OUTPATIENT)
Dept: PRIMARY CARE CLINIC | Facility: CLINIC | Age: 55
End: 2025-05-08
Payer: MEDICARE

## 2025-05-11 DIAGNOSIS — K59.00 CONSTIPATION, UNSPECIFIED CONSTIPATION TYPE: ICD-10-CM

## 2025-05-11 DIAGNOSIS — K59.04 CHRONIC IDIOPATHIC CONSTIPATION: ICD-10-CM

## 2025-05-11 NOTE — TELEPHONE ENCOUNTER
No care due was identified.  [facility] Embedded Care Due Messages. Reference number: [id number].   5/11/2025 8:04:29 AM CDT

## 2025-05-12 ENCOUNTER — PATIENT OUTREACH (OUTPATIENT)
Dept: ADMINISTRATIVE | Facility: HOSPITAL | Age: 55
End: 2025-05-12
Payer: MEDICARE

## 2025-05-12 RX ORDER — LINACLOTIDE 290 UG/1
CAPSULE, GELATIN COATED ORAL
Qty: 90 CAPSULE | Refills: 0 | Status: SHIPPED | OUTPATIENT
Start: 2025-05-12

## 2025-05-12 NOTE — PROGRESS NOTES
VBHM Score: 1     Foot Exam                 L/m for introduction to patient care coordination  And review of SDOH  Not eligible for digital medicine   Return number provided

## 2025-05-15 DIAGNOSIS — M54.41 CHRONIC RIGHT-SIDED LOW BACK PAIN WITH RIGHT-SIDED SCIATICA: ICD-10-CM

## 2025-05-15 DIAGNOSIS — E11.9 TYPE 2 DIABETES MELLITUS WITHOUT COMPLICATION, WITHOUT LONG-TERM CURRENT USE OF INSULIN: ICD-10-CM

## 2025-05-15 DIAGNOSIS — G89.29 CHRONIC RIGHT-SIDED LOW BACK PAIN WITH RIGHT-SIDED SCIATICA: ICD-10-CM

## 2025-05-15 RX ORDER — DULOXETIN HYDROCHLORIDE 60 MG/1
60 CAPSULE, DELAYED RELEASE ORAL
Qty: 90 CAPSULE | Refills: 1 | Status: SHIPPED | OUTPATIENT
Start: 2025-05-15

## 2025-05-15 RX ORDER — METFORMIN HYDROCHLORIDE 1000 MG/1
1000 TABLET ORAL 2 TIMES DAILY WITH MEALS
Qty: 180 TABLET | Refills: 1 | Status: SHIPPED | OUTPATIENT
Start: 2025-05-15

## 2025-05-15 NOTE — TELEPHONE ENCOUNTER
----- Message from Jacquie sent at 5/15/2025 10:35 AM CDT -----  Regarding: Referral Request  .Type:  Patient Requesting ReferralWho Called: self Referral to What Specialty: Pain Reason for Referral: Back Pain Does the patient want the referral with a specific physician?: no specific [facility] the [facility] Call Back Number: .[phone] Information:

## 2025-05-15 NOTE — TELEPHONE ENCOUNTER
No care due was identified.  [facility] Embedded Care Due Messages. Reference number: [id number].   5/15/2025 9:47:20 AM CDT

## 2025-05-15 NOTE — TELEPHONE ENCOUNTER
Refill Routing Note   Medication(s) are not appropriate for processing by [facility] for the following reason(s):        Drug-drug interaction    [facility] action(s):  Approve  Defer      Medication Therapy Plan: Duplicate Therapy: paroxetine, DULoxetine    Pharmacist review requested: Yes     Appointments  past 12m or future 3m with PCP    Date Provider   Last Visit   9/20/2024 Aman Patino MD   Next Visit   Visit date not found Aman Patino MD   ED visits in past 90 days: 0        Note composed:12:23 PM 05/15/2025

## 2025-05-15 NOTE — TELEPHONE ENCOUNTER
Refill Decision Note   Haseeb Reilly  is requesting a refill authorization.  Brief Assessment and Rationale for Refill:  Approve     Medication Therapy Plan:         Pharmacist review requested: Yes   Extended chart review required: Yes   Comments:     Note composed:12:27 PM 05/15/2025

## 2025-05-20 ENCOUNTER — OFFICE VISIT (OUTPATIENT)
Dept: UROLOGY | Facility: CLINIC | Age: 55
End: 2025-05-20
Payer: MEDICARE

## 2025-05-20 VITALS
HEART RATE: 89 BPM | WEIGHT: 233.69 LBS | BODY MASS INDEX: 32.72 KG/M2 | HEIGHT: 71 IN | SYSTOLIC BLOOD PRESSURE: 130 MMHG | DIASTOLIC BLOOD PRESSURE: 85 MMHG

## 2025-05-20 DIAGNOSIS — N52.1 ERECTILE DYSFUNCTION DUE TO DISEASES CLASSIFIED ELSEWHERE: ICD-10-CM

## 2025-05-20 DIAGNOSIS — E11.9 TYPE 2 DIABETES MELLITUS WITHOUT COMPLICATION, WITHOUT LONG-TERM CURRENT USE OF INSULIN: ICD-10-CM

## 2025-05-20 PROCEDURE — 99999 PR PBB SHADOW E&M-EST. PATIENT-LVL III: CPT | Mod: PBBFAC,,, | Performed by: NURSE PRACTITIONER

## 2025-05-20 RX ORDER — TADALAFIL 20 MG/1
20 TABLET ORAL DAILY
Qty: 15 TABLET | Refills: 3 | Status: SHIPPED | OUTPATIENT
Start: 2025-05-20 | End: 2025-06-04

## 2025-05-20 NOTE — TELEPHONE ENCOUNTER
----- Message from Jos sent at 5/19/2025 11:03 PM CDT -----  Contact: [phone] (Mobile)    ----- Message -----  From: Lia Giles  Sent: 5/19/2025   1:41 PM CDT  To: [facility] Vega Staff    Requesting an RX refill or new RX.Is this a refill or new RX: refillRX name and strength (copy/paste from chart):  semaglutide (OZEMPIC) 1 mg/dose (4 mg/3 mL) Is this a 30 day or 90 day RX: Pharmacy name and phone # (copy/paste from chart):  [facility] [address] [place], [place] - [address] [facility] [place] [address]Lgyqy: [phone] Fax: [phone]: pt is completley out

## 2025-05-20 NOTE — TELEPHONE ENCOUNTER
Care Due:                  Date            Visit Type   Department     Provider  --------------------------------------------------------------------------------                                EP -                              PRIMARY      [facility] PRIMARY  Last Visit: 09-      CARE ([facility])   [facility]  Next Visit: None Scheduled  None         None Found                                                            Last  Test          Frequency    Reason                     Performed    Due Date  --------------------------------------------------------------------------------    Lipid Panel.  12 months..  atorvastatin.............  08-   08-    Health [facility] Embedded Care Due Messages. Reference number: [id number].   5/20/2025 10:09:56 AM CDT

## 2025-05-20 NOTE — PROGRESS NOTES
"Subjective:      Haseeb Reilly is a 54 y.o. male who presents for evaluation of ED.      Last seen in 2022 for ED prescribe Cialis.  States that Cialis did not work well.  Most recently took 2 of his friends Viagra 100 mg with no noticeable results.  He is currently not interested in TriMix or IPP.  Requesting refill of Cialis    The following portions of the patient's history were reviewed and updated as appropriate: allergies, current medications, past family history, past medical history, past social history, past surgical history and problem list.    Review of Systems  Constitutional: no fever or chills  ENT: no nasal congestion or sore throat  Respiratory: no cough or shortness of breath  Cardiovascular: no chest pain or palpitations  Gastrointestinal: no nausea or vomiting, tolerating diet  Genitourinary: as per HPI  Hematologic/Lymphatic: no easy bruising or lymphadenopathy  Musculoskeletal: no arthralgias or myalgias  Neurological: no seizures or tremors  Behavioral/Psych: no auditory or visual hallucinations     Objective:   Vitals: /85 (BP Location: Left arm, Patient Position: Sitting)   Pulse 89   Ht 5' 11" (1.803 m)   Wt 106 kg (233 lb 11 oz)   BMI 32.59 kg/m²     Physical Exam   General: alert and oriented, no acute distress  Head: normocephalic, atraumatic  Neck: supple, no lymphadenopathy, normal ROM, no masses  Skin: normal coloration and turgor, no rashes, no suspicious skin lesions noted  Neuro: alert and oriented x3, no gross deficits  Psych: normal judgment and insight, normal mood/affect, and non-anxious    Physical Exam    Lab Review   Urinalysis demonstrates no ua   Lab Results   Component Value Date    WBC 6.37 02/28/2024    HGB 14.5 02/28/2024    HCT 42.6 02/28/2024    MCV 88 02/28/2024     02/28/2024     Lab Results   Component Value Date    CREATININE 0.9 05/01/2025    BUN 13 05/01/2025       Assessment and Plan:   1. Erectile dysfunction due to diseases classified " elsewhere  - Ambulatory referral/consult to Urology  - tadalafiL (CIALIS) 20 MG Tab; Take 1 tablet (20 mg total) by mouth once daily. for 15 days  Dispense: 15 tablet; Refill: 3   Is TriMix injections and IPP; patient will notify office if he would like to proceed with either    This note is dictated on M*Modal word recognition program.  There are word recognition mistakes that are occasionally missed on review.

## 2025-05-21 RX ORDER — SEMAGLUTIDE 1.34 MG/ML
1 INJECTION, SOLUTION SUBCUTANEOUS
Qty: 9 ML | Refills: 0 | Status: SHIPPED | OUTPATIENT
Start: 2025-05-21

## 2025-05-21 NOTE — TELEPHONE ENCOUNTER
----- Message from Estefanía sent at 5/21/2025  1:53 PM CDT -----  Contact: Pt  [phone]  Requesting an RX refill or new RX.Is this a refill or new RX: RefillRX name and strength (copy/paste from chart):  semaglutide (OZEMPIC) 1 mg/dose (4 mg/3 mL)Is this a 30 day or 90 day RX: 30 w/refillsPharmacy name and phone # (copy/paste from chart):  [facility] [address] [place] [address] - [address] [facility] [address]Vvplf: [phone] Fax: [phone] call and advise.Thank You

## 2025-05-28 DIAGNOSIS — G89.29 CHRONIC RIGHT-SIDED LOW BACK PAIN WITH RIGHT-SIDED SCIATICA: ICD-10-CM

## 2025-05-28 DIAGNOSIS — M54.41 CHRONIC RIGHT-SIDED LOW BACK PAIN WITH RIGHT-SIDED SCIATICA: ICD-10-CM

## 2025-05-28 NOTE — TELEPHONE ENCOUNTER
No care due was identified.  [facility] Embedded Care Due Messages. Reference number: [id number].   5/28/2025 10:03:28 AM CDT

## 2025-05-29 ENCOUNTER — TELEPHONE (OUTPATIENT)
Dept: PODIATRY | Facility: CLINIC | Age: 55
End: 2025-05-29
Payer: MEDICARE

## 2025-05-29 NOTE — TELEPHONE ENCOUNTER
Left message that we need to reschedule your appointment on 6/26/25. I rescheduled you on the next available cherelle on 7/22/25 @4:00pm. Please call office if that date and time don't work.

## 2025-05-30 RX ORDER — CYCLOBENZAPRINE HCL 10 MG
10 TABLET ORAL 3 TIMES DAILY
Qty: 90 TABLET | Refills: 0 | Status: SHIPPED | OUTPATIENT
Start: 2025-05-30

## 2025-06-03 ENCOUNTER — OFFICE VISIT (OUTPATIENT)
Dept: PRIMARY CARE CLINIC | Facility: CLINIC | Age: 55
End: 2025-06-03
Payer: MEDICARE

## 2025-06-03 VITALS
TEMPERATURE: 98 F | HEART RATE: 93 BPM | HEIGHT: 71 IN | BODY MASS INDEX: 33.43 KG/M2 | DIASTOLIC BLOOD PRESSURE: 78 MMHG | WEIGHT: 238.75 LBS | SYSTOLIC BLOOD PRESSURE: 123 MMHG | OXYGEN SATURATION: 96 %

## 2025-06-03 DIAGNOSIS — M75.101 TEAR OF RIGHT ROTATOR CUFF, UNSPECIFIED TEAR EXTENT, UNSPECIFIED WHETHER TRAUMATIC: ICD-10-CM

## 2025-06-03 DIAGNOSIS — R29.3 POOR POSTURE: ICD-10-CM

## 2025-06-03 DIAGNOSIS — M54.50 CHRONIC BILATERAL LOW BACK PAIN WITHOUT SCIATICA: ICD-10-CM

## 2025-06-03 DIAGNOSIS — F41.9 ANXIETY AND DEPRESSION: ICD-10-CM

## 2025-06-03 DIAGNOSIS — M25.611 DECREASED ROM OF RIGHT SHOULDER: ICD-10-CM

## 2025-06-03 DIAGNOSIS — G62.9 POLYNEUROPATHY: ICD-10-CM

## 2025-06-03 DIAGNOSIS — N52.01 ERECTILE DYSFUNCTION DUE TO ARTERIAL INSUFFICIENCY: ICD-10-CM

## 2025-06-03 DIAGNOSIS — H81.10 BENIGN PAROXYSMAL POSITIONAL VERTIGO, UNSPECIFIED LATERALITY: ICD-10-CM

## 2025-06-03 DIAGNOSIS — Z00.00 ENCOUNTER FOR PREVENTIVE HEALTH EXAMINATION: Primary | ICD-10-CM

## 2025-06-03 DIAGNOSIS — R26.9 ABNORMALITY OF GAIT AND MOBILITY: ICD-10-CM

## 2025-06-03 DIAGNOSIS — K59.04 CHRONIC IDIOPATHIC CONSTIPATION: ICD-10-CM

## 2025-06-03 DIAGNOSIS — G89.29 CHRONIC RIGHT SHOULDER PAIN: ICD-10-CM

## 2025-06-03 DIAGNOSIS — I10 ESSENTIAL HYPERTENSION: ICD-10-CM

## 2025-06-03 DIAGNOSIS — E88.09 HYPOALBUMINEMIA: ICD-10-CM

## 2025-06-03 DIAGNOSIS — M25.511 CHRONIC RIGHT SHOULDER PAIN: ICD-10-CM

## 2025-06-03 DIAGNOSIS — K21.9 CHRONIC GERD: ICD-10-CM

## 2025-06-03 DIAGNOSIS — M54.41 CHRONIC RIGHT-SIDED LOW BACK PAIN WITH RIGHT-SIDED SCIATICA: ICD-10-CM

## 2025-06-03 DIAGNOSIS — G89.29 CHRONIC BILATERAL LOW BACK PAIN WITHOUT SCIATICA: ICD-10-CM

## 2025-06-03 DIAGNOSIS — G89.29 CHRONIC RIGHT-SIDED LOW BACK PAIN WITH RIGHT-SIDED SCIATICA: ICD-10-CM

## 2025-06-03 DIAGNOSIS — F32.A ANXIETY AND DEPRESSION: ICD-10-CM

## 2025-06-03 DIAGNOSIS — E11.9 TYPE 2 DIABETES MELLITUS WITHOUT COMPLICATION, WITHOUT LONG-TERM CURRENT USE OF INSULIN: ICD-10-CM

## 2025-06-03 DIAGNOSIS — E78.5 HYPERLIPIDEMIA, UNSPECIFIED HYPERLIPIDEMIA TYPE: ICD-10-CM

## 2025-06-03 DIAGNOSIS — M47.26 OSTEOARTHRITIS OF SPINE WITH RADICULOPATHY, LUMBAR REGION: ICD-10-CM

## 2025-06-03 DIAGNOSIS — Z00.00 ENCOUNTER FOR MEDICARE ANNUAL WELLNESS EXAM: ICD-10-CM

## 2025-06-03 PROBLEM — E66.01 CLASS 2 SEVERE OBESITY DUE TO EXCESS CALORIES WITH SERIOUS COMORBIDITY AND BODY MASS INDEX (BMI) OF 38.0 TO 38.9 IN ADULT: Status: RESOLVED | Noted: 2023-04-21 | Resolved: 2025-06-03

## 2025-06-03 PROBLEM — E53.8 VITAMIN B12 DEFICIENCY: Status: RESOLVED | Noted: 2022-12-05 | Resolved: 2025-06-03

## 2025-06-03 PROBLEM — E66.812 CLASS 2 SEVERE OBESITY DUE TO EXCESS CALORIES WITH SERIOUS COMORBIDITY AND BODY MASS INDEX (BMI) OF 38.0 TO 38.9 IN ADULT: Status: RESOLVED | Noted: 2023-04-21 | Resolved: 2025-06-03

## 2025-06-03 PROCEDURE — 99999 PR PBB SHADOW E&M-EST. PATIENT-LVL V: CPT | Mod: PBBFAC,,, | Performed by: NURSE PRACTITIONER

## 2025-06-03 PROCEDURE — 3074F SYST BP LT 130 MM HG: CPT | Mod: CPTII,S$GLB,, | Performed by: NURSE PRACTITIONER

## 2025-06-03 PROCEDURE — 3044F HG A1C LEVEL LT 7.0%: CPT | Mod: CPTII,S$GLB,, | Performed by: NURSE PRACTITIONER

## 2025-06-03 PROCEDURE — 3078F DIAST BP <80 MM HG: CPT | Mod: CPTII,S$GLB,, | Performed by: NURSE PRACTITIONER

## 2025-06-03 PROCEDURE — 1159F MED LIST DOCD IN RCRD: CPT | Mod: CPTII,S$GLB,, | Performed by: NURSE PRACTITIONER

## 2025-06-03 PROCEDURE — 4010F ACE/ARB THERAPY RXD/TAKEN: CPT | Mod: CPTII,S$GLB,, | Performed by: NURSE PRACTITIONER

## 2025-06-03 PROCEDURE — G0439 PPPS, SUBSEQ VISIT: HCPCS | Mod: S$GLB,,, | Performed by: NURSE PRACTITIONER

## 2025-06-05 ENCOUNTER — OFFICE VISIT (OUTPATIENT)
Dept: ORTHOPEDICS | Facility: CLINIC | Age: 55
End: 2025-06-05
Payer: MEDICARE

## 2025-06-05 DIAGNOSIS — M75.21 BICEPS TENDINITIS OF RIGHT SHOULDER: ICD-10-CM

## 2025-06-05 DIAGNOSIS — S46.011D TRAUMATIC TEAR OF RIGHT ROTATOR CUFF, UNSPECIFIED TEAR EXTENT, SUBSEQUENT ENCOUNTER: Primary | ICD-10-CM

## 2025-06-05 DIAGNOSIS — M24.111 DEGENERATIVE TEAR OF GLENOID LABRUM, RIGHT: ICD-10-CM

## 2025-06-05 DIAGNOSIS — M19.011 PRIMARY OSTEOARTHRITIS OF RIGHT SHOULDER: ICD-10-CM

## 2025-06-05 PROCEDURE — 3044F HG A1C LEVEL LT 7.0%: CPT | Mod: CPTII,S$GLB,, | Performed by: PHYSICIAN ASSISTANT

## 2025-06-05 PROCEDURE — 99203 OFFICE O/P NEW LOW 30 MIN: CPT | Mod: 25,S$GLB,, | Performed by: PHYSICIAN ASSISTANT

## 2025-06-05 PROCEDURE — 4010F ACE/ARB THERAPY RXD/TAKEN: CPT | Mod: CPTII,S$GLB,, | Performed by: PHYSICIAN ASSISTANT

## 2025-06-05 PROCEDURE — 20610 DRAIN/INJ JOINT/BURSA W/O US: CPT | Mod: RT,S$GLB,, | Performed by: PHYSICIAN ASSISTANT

## 2025-06-05 PROCEDURE — 99999 PR PBB SHADOW E&M-EST. PATIENT-LVL I: CPT | Mod: PBBFAC,,, | Performed by: PHYSICIAN ASSISTANT

## 2025-06-05 RX ORDER — LIDOCAINE HYDROCHLORIDE 10 MG/ML
4 INJECTION, SOLUTION INFILTRATION; PERINEURAL
Status: DISCONTINUED | OUTPATIENT
Start: 2025-06-05 | End: 2025-06-05 | Stop reason: HOSPADM

## 2025-06-05 RX ORDER — TRIAMCINOLONE ACETONIDE 40 MG/ML
40 INJECTION, SUSPENSION INTRA-ARTICULAR; INTRAMUSCULAR
Status: DISCONTINUED | OUTPATIENT
Start: 2025-06-05 | End: 2025-06-05 | Stop reason: HOSPADM

## 2025-06-05 RX ADMIN — LIDOCAINE HYDROCHLORIDE 4 ML: 10 INJECTION, SOLUTION INFILTRATION; PERINEURAL at 01:06

## 2025-06-05 RX ADMIN — TRIAMCINOLONE ACETONIDE 40 MG: 40 INJECTION, SUSPENSION INTRA-ARTICULAR; INTRAMUSCULAR at 01:06

## 2025-06-20 DIAGNOSIS — E78.5 HYPERLIPIDEMIA, UNSPECIFIED HYPERLIPIDEMIA TYPE: ICD-10-CM

## 2025-06-20 RX ORDER — ATORVASTATIN CALCIUM 40 MG/1
TABLET, FILM COATED ORAL
Qty: 90 TABLET | Refills: 1 | Status: SHIPPED | OUTPATIENT
Start: 2025-06-20

## 2025-06-20 NOTE — TELEPHONE ENCOUNTER
Care Due:                  Date            Visit Type   Department     Provider  --------------------------------------------------------------------------------                                EP -                              PRIMARY      [facility] PRIMARY  Last Visit: 09-      CARE ([facility])   CARE           Aman Patino  Next Visit: None Scheduled  None         None Found                                                            Last  Test          Frequency    Reason                     Performed    Due Date  --------------------------------------------------------------------------------    Office Visit  12 months..  LINZESS..................  09-   09-    Health [facility] Embedded Care Due Messages. Reference number: [id number].   6/20/2025 9:04:43 AM CDT

## 2025-06-20 NOTE — TELEPHONE ENCOUNTER
Provider Staff:  Action required for this patient    Requires appointment      Please see care gap opportunities below in Care Due Message.    Thanks!  [facility]     Appointments      Date Provider   Last Visit   9/20/2024 Aman Patino MD   Next Visit   Visit date not found Aman Patino MD     Refill Decision Note   Haseeb Reilly  is requesting a refill authorization.  Brief Assessment and Rationale for Refill:  Approve     Medication Therapy Plan:        Comments:     Note composed:2:56 PM 06/20/2025

## 2025-06-26 ENCOUNTER — CLINICAL SUPPORT (OUTPATIENT)
Dept: REHABILITATION | Facility: HOSPITAL | Age: 55
End: 2025-06-26
Payer: MEDICARE

## 2025-06-26 DIAGNOSIS — M24.111 DEGENERATIVE TEAR OF GLENOID LABRUM, RIGHT: ICD-10-CM

## 2025-06-26 DIAGNOSIS — M75.21 BICEPS TENDINITIS OF RIGHT SHOULDER: ICD-10-CM

## 2025-06-26 DIAGNOSIS — M25.611 DECREASED RIGHT SHOULDER RANGE OF MOTION: Primary | ICD-10-CM

## 2025-06-26 DIAGNOSIS — S46.011D TRAUMATIC TEAR OF RIGHT ROTATOR CUFF, UNSPECIFIED TEAR EXTENT, SUBSEQUENT ENCOUNTER: ICD-10-CM

## 2025-06-26 PROCEDURE — 97162 PT EVAL MOD COMPLEX 30 MIN: CPT | Mod: PN

## 2025-06-26 NOTE — PROGRESS NOTES
Outpatient Rehab    Physical Therapy Evaluation (only)    Patient Name: Haseeb Reilly  MRN: [id number]  YOB: 1970  Encounter Date: 6/26/2025    Therapy Diagnosis:   Encounter Diagnoses   Name Primary?    Traumatic tear of right rotator cuff, unspecified tear extent, subsequent encounter     Degenerative tear of glenoid labrum, right     Biceps tendinitis of right shoulder     Decreased right shoulder range of motion Yes     Physician: Diana Arredondo PA-C    Physician Orders: Eval and Treat  Medical Diagnosis: Traumatic tear of right rotator cuff, unspecified tear extent, subsequent encounter  Degenerative tear of glenoid labrum, right  Biceps tendinitis of right shoulder  Surgical Diagnosis: Not applicable for this Episode  Surgical Date: Not applicable for this Episode  Days Since Last Surgery: Not applicable for this Episode    Visit # / Visits Authorized:  1 / 1  Insurance Authorization Period: 6/5/2025 to 6/5/2026  Date of Evaluation: 6/26/2025  Plan of Care Certification: 6/26/2025 to 8/17/25     Time In:     Time Out:    Total Time (in minutes):     Total Billable Time (in minutes):      Intake Outcome Measure for FOTO Survey    Therapist reviewed FOTO scores for Haseeb Reilly on 6/26/2025.   FOTO report - see Media section or FOTO account episode details.     Intake Score:  %    Precautions:  Range of Motion Restrictions: - Patient to perform back stretches once in the morning and once at night.  - Patient to engage in core strengthening workouts, yoga, or Pilates.  - Patient to continue weight loss efforts through healthy eating and staying active.  - Patient to avoid heavy lifting or pushing to prevent further shoulder damage.  - Patient to sleep on back to avoid aggravating shoulder injury.  - HEP print out provided.         Subjective   History of Present Illness  Haseeb is a 54 y.o. male      The patient reports a medical diagnosis of S46.011D (ICD-10-CM) - Traumatic tear of  "right rotator cuff, unspecified tear extent, subsequent encounter M24.111 (ICD-10-CM) - Degenerative tear of glenoid labrum, right M75.21 (ICD-10-CM) - Biceps tendinitis of right shoulder. The patient has experienced this issue since 06/01/24.   Diagnostic tests related to this condition: MRI studies.   MRI Studies Details: 1. Supraspinatus tendinosis with a medium-sized, high-grade partial to full-thickness footprint tear that demonstrates 1.6 cm of fiber retraction.  No volume loss.  2. Infraspinatus calcific tendinitis.  3. Degenerative fraying/tearing of the superior labrum.  4. Biceps tendinosis/tenosynovitis.  5. Mild glenohumeral osteoarthritis.  6. AC joint arthrosis with a large joint effusion, associated marrow edema and questionable erosion versus chronic remodeling of the opposing articular surfaces.  Radiographic correlation recommended.  7. Prominent subacromial subdeltoid bursitis with associated mass effect on the underlying supraspinatus muscle.    History of Present Condition/Illness: Patient reports in June 2024 injury occurred while attempting to break a bolt while repairing a car. Patient reports hearing a loud auditory pop with pain immediately following. Patient also reports difficulty with sleeping with numbness of the right shoulder when sleeping on the left and vise versa. Patient also reports lumbar pain. Per MD - "Referred to physical therapy for shoulder range of motion and strength improvement."    Pain     Patient reports a current pain level of 3/10. Pain at best is reported as 0/10. Pain at worst is reported as 10/10.   Location: 10 - AROM  Clinical Progression (since onset): Stable  Pain Qualities: Other (Comment), Sharp  Other Pain Qualities: cracking / shooting  Pain-Relieving Factors: Sitting, Relaxation  Pain-Aggravating Factors: Bending, Reaching, Other (Comment)  Other Pain-Aggravating Factors: scratching back         Treatment History  Treatments  Previously Received " Treatments: Yes  Previous Treatments: Physical therapy    Living Arrangements  Living Situation  Housing: Home independently        Employment  Employment Status: On disability          Past Medical History/Physical Systems Review:   Haseeb Reilly  has a past medical history of Anxiety, Arthritis, Back pain, Class 2 severe obesity due to excess calories with serious comorbidity and body mass index (BMI) of 38.0 to 38.9 in adult, Depression, Diabetes mellitus, Hypertension, Mixed hyperlipidemia, Obesity, Renal manifestation of secondary diabetes mellitus, Trouble in sleeping, Type 2 diabetes mellitus with ophthalmic manifestations, and Vitamin B12 deficiency.    Haseeb Reilly  has no past surgical history on file.    Haseeb has a current medication list which includes the following prescription(s): amitriptyline, atorvastatin, blood sugar diagnostic, blood-glucose meter, clonidine, cyclobenzaprine, diltiazem, duloxetine, furosemide, jardiance, lancets, linzess, lisinopril-hydrochlorothiazide, meclizine, metformin, omeprazole, paroxetine, pen needle, diabetic, pregabalin, ozempic, and tadalafil.    Review of patient's allergies indicates:   Allergen Reactions    Levonorgestrel-ethinyl estrad      Other reaction(s): sinus, running nose, cough, etc.    Morphine      Other reaction(s): Not available        Objective      Shoulder Range of Motion  Right Shoulder   Active (deg) Passive (deg) Pain   Flexion 90       Extension 55       Scaption 80       ABduction 95       ADduction         Horizontal ABduction         Horizontal ADduction         External Rotation (Shoulder ABducted 0 degrees) 45       External Rotation (Shoulder ABducted 45 degrees)         External Rotation (Shoulder ABducted 90 degrees)         Internal Rotation (Shoulder ABducted 0 degrees) 60       Internal Rotation (Shoulder ABducted 45 degrees)         Internal Rotation (Shoulder ABducted 90 degrees)           Left Shoulder   Active (deg)  Passive (deg) Pain   Flexion 165       Extension 140       Scaption 160       ABduction 70       ADduction         Horizontal ABduction         Horizontal ADduction         External Rotation (Shoulder ABducted 0 degrees) 50       External Rotation (Shoulder ABducted 45 degrees)         External Rotation (Shoulder ABducted 90 degrees)         Internal Rotation (Shoulder ABducted 0 degrees) 50       Internal Rotation (Shoulder ABducted 45 degrees)         Internal Rotation (Shoulder ABducted 90 degrees)                       Shoulder Strength - Planes of Motion   Right Strength Right Pain Left Strength Left  Pain   Flexion 3   4     Extension 3   4     ABduction 3   4     ADduction 3   4     Horizontal ABduction 3   4     Horizontal ADduction 3   4     Internal Rotation 0° 3   4     Internal Rotation 90° 3   4     External Rotation 0° 3   4     External Rotation 90° 3   4         Shoulder Strength - Rotator Cuff Muscles   Right Strength Right Pain Left Strength Left  Pain   Supraspinatus 2+   4     Infraspinatus           Teres Minor           Subscapularis             Elbow Strength   Right Strength Right Pain Left Strength Left  Pain   Flexion (C6) 4   4     Extension (C7) 4   4          Forearm Strength   Right Strength Right Pain Left Strength Left  Pain   Pronation 4   4     Supination 4   4         Right  Strength  Right Hand Dynamometer Position: 2  Elbow Position Forearm Position Trial 1 (lbs) Trial 2  (lbs) Trial 3  (lbs) Average  (lbs) Pain   Flexed Neutral 60 80             Left  Strength  Left Hand Dynamometer Position: 2  Elbow Position Forearm Position Trial 1 (lbs) Trial 2 (lbs) Trial 3 (lbs) Average (lbs) Pain   Flexed Neutral 90 90                    Shoulder Special Tests  Rotator Cuff Tests  Positive: Right Empty Can  Negative: Left Empty Can           Shoulder Joint Mobility  Right Shoulder Mobility  Hypomobile: Posterior Capsule Mobility and Inferior Capsule Mobility                         Time Entry(in minutes):       Assessment & Plan   Assessment  Haseeb presents with a condition of Low complexity.   Presentation of Symptoms: Stable  Will Comorbidities Impact Care: No       Functional Limitations: Activity tolerance, Completing self-care activities, Proprioception, Range of motion, Participating in leisure activities, Pain with ADLs/IADLs, Gross motor coordination  Impairments: Pain with functional activity, Impaired physical strength  Personal Factors Affecting Prognosis: Pain    Prognosis: Excellent  Assessment Details: Patient demonstrates deficits with range of motion, strength, and function that limit ability to participate in school, work, and recreational activities. They would benefit from skilled PT services to normalize kinetic chain mobility, strength, and function to safely return to their prior level of activity.      The patient's spiritual, cultural, and educational needs were considered, and the patient is agreeable to the plan of care and goals.           Goals:   Active       LTG       Patient will improve AROM shoulder flexion and abduction > 90 degrees        Start:  06/26/25                Sudheer Whalen PT

## 2025-06-27 DIAGNOSIS — K21.9 GASTRIC REFLUX: ICD-10-CM

## 2025-06-27 NOTE — TELEPHONE ENCOUNTER
Refill Routing Note   Medication(s) are not appropriate for processing by [facility] for the following reason(s):        Outside of protocol: PRN USE    [facility] action(s):  Route      Medication Therapy Plan: PRN USE FOR OMEPRAZOLE IS OUTSIDE OF PROTOCOL      Appointments  past 12m or future 3m with PCP    Date Provider   Last Visit   9/20/2024 Aman Patino MD   Next Visit   Visit date not found Aman Patino MD   ED visits in past 90 days: 0        Note composed:4:13 PM 06/27/2025

## 2025-06-27 NOTE — TELEPHONE ENCOUNTER
No care due was identified.  [facility] Embedded Care Due Messages. Reference number: [id number].   6/27/2025 10:34:16 AM CDT

## 2025-06-28 RX ORDER — OMEPRAZOLE 40 MG/1
CAPSULE, DELAYED RELEASE ORAL
Qty: 90 CAPSULE | Refills: 2 | Status: SHIPPED | OUTPATIENT
Start: 2025-06-28

## 2025-07-01 DIAGNOSIS — M54.50 CHRONIC BILATERAL LOW BACK PAIN WITHOUT SCIATICA: ICD-10-CM

## 2025-07-01 DIAGNOSIS — M25.511 CHRONIC RIGHT SHOULDER PAIN: ICD-10-CM

## 2025-07-01 DIAGNOSIS — G89.29 CHRONIC BILATERAL LOW BACK PAIN WITHOUT SCIATICA: ICD-10-CM

## 2025-07-01 DIAGNOSIS — G89.29 CHRONIC RIGHT SHOULDER PAIN: ICD-10-CM

## 2025-07-01 RX ORDER — CYCLOBENZAPRINE HCL 10 MG
10 TABLET ORAL 3 TIMES DAILY
Qty: 90 TABLET | Refills: 2 | Status: SHIPPED | OUTPATIENT
Start: 2025-07-01

## 2025-07-01 NOTE — TELEPHONE ENCOUNTER
No care due was identified.  [facility] Embedded Care Due Messages. Reference number: [id number].   7/01/2025 10:01:49 AM CDT

## 2025-07-08 ENCOUNTER — CLINICAL SUPPORT (OUTPATIENT)
Dept: REHABILITATION | Facility: HOSPITAL | Age: 55
End: 2025-07-08
Payer: MEDICAID

## 2025-07-08 DIAGNOSIS — M25.611 DECREASED RIGHT SHOULDER RANGE OF MOTION: Primary | ICD-10-CM

## 2025-07-08 PROCEDURE — 97110 THERAPEUTIC EXERCISES: CPT | Mod: PN,CQ

## 2025-07-08 NOTE — PROGRESS NOTES
"  Outpatient Rehab    Physical Therapy Visit    Patient Name: Haseeb Reilly  MRN: [id number]  YOB: 1970  Encounter Date: 7/8/2025    Therapy Diagnosis:   Encounter Diagnosis   Name Primary?    Decreased right shoulder range of motion Yes     Physician: Diana Arredondo PA-C    Physician Orders: Eval and Treat  Medical Diagnosis: Traumatic tear of right rotator cuff, unspecified tear extent, subsequent encounter  Degenerative tear of glenoid labrum, right  Biceps tendinitis of right shoulder  Surgical Diagnosis: Not applicable for this Episode   Surgical Date: Not applicable for this Episode  Days Since Last Surgery: Not applicable for this Episode    Visit # / Visits Authorized:  1 / 10  Insurance Authorization Period: 6/26/2025 to 12/31/2025  Date of Evaluation: 6/26/2025  Plan of Care Certification:       PT/PTA: PTA   Number of PTA visits since last PT visit:1  Time In: 1400   Time Out: 1500  Total Time (in minutes): 60   Total Billable Time (in minutes): 60        Precautions:       Subjective   Patient reports discomfort in his back and shoulder.  Pain reported as 5/10.      Objective            Treatment:  Therapeutic Exercise  TE 1: UBE 5/5  TE 3: Shoulder isometrics 10x10" FLX,IR,ER,  TE 7: Pulley's FLX & ABD 3 min  TE 8: Wall slides FLX & ABD 3min  TE 9: GTB rows 3x10      Time Entry(in minutes):  Therapeutic Exercise Time Entry: 60    Assessment & Plan   Assessment: Today is Mr. Membreno's first visit after eval. He has muscle weakness with shoulder exercises, but was able to complete exercises with moderate rest. Patient tried No monies but exercise increased pain. No Monies were put on hold. Patient will be progressed as tolerated.  Evaluation/Treatment Tolerance: Patient limited by fatigue    The patient will continue to benefit from skilled outpatient physical therapy in order to address the deficits listed in the problem list on the initial evaluation, provide patient and family " education, and maximize the patients level of independence in the home and community environments.     The patient's spiritual, cultural, and educational needs were considered, and the patient is agreeable to the plan of care and goals.           Plan: Continue with Physical Therapist Plan of Care    Goals:   Active       LTG       Patient will improve AROM shoulder flexion and abduction > 90 degrees  (Progressing)       Start:  06/26/25                Michelle Gray PTA

## 2025-07-15 ENCOUNTER — CLINICAL SUPPORT (OUTPATIENT)
Dept: REHABILITATION | Facility: HOSPITAL | Age: 55
End: 2025-07-15
Payer: MEDICAID

## 2025-07-15 ENCOUNTER — PATIENT MESSAGE (OUTPATIENT)
Dept: ORTHOPEDICS | Facility: CLINIC | Age: 55
End: 2025-07-15
Payer: MEDICAID

## 2025-07-15 ENCOUNTER — TELEPHONE (OUTPATIENT)
Dept: ORTHOPEDICS | Facility: CLINIC | Age: 55
End: 2025-07-15
Payer: MEDICAID

## 2025-07-15 DIAGNOSIS — M25.611 DECREASED RIGHT SHOULDER RANGE OF MOTION: Primary | ICD-10-CM

## 2025-07-15 PROCEDURE — 97110 THERAPEUTIC EXERCISES: CPT | Mod: PN

## 2025-07-15 NOTE — PROGRESS NOTES
Outpatient Rehab    Physical Therapy Visit    Patient Name: Haseeb Reilly  MRN: [id number]  YOB: 1970  Encounter Date: 7/15/2025    Therapy Diagnosis:   Encounter Diagnosis   Name Primary?    Decreased right shoulder range of motion Yes     Physician: Diana Arredondo PA-C    Physician Orders: Eval and Treat  Medical Diagnosis: Traumatic tear of right rotator cuff, unspecified tear extent, subsequent encounter  Degenerative tear of glenoid labrum, right  Biceps tendinitis of right shoulder  Surgical Diagnosis: Not applicable for this Episode   Surgical Date: Not applicable for this Episode  Days Since Last Surgery: Not applicable for this Episode    Visit # / Visits Authorized:  2 / 10  Insurance Authorization Period: 6/26/2025 to 12/31/2025  Date of Evaluation: 6/26/2025  Plan of Care Certification:       PT/PTA: PT   Number of PTA visits since last PT visit:0  Time In: 1100   Time Out: 1200  Total Time (in minutes): 60   Total Billable Time (in minutes): 60    FOTO:  Intake Score: Not applicable for this Episode%  Survey Score 2: Not applicable for this Episode%  Survey Score 3: Not applicable for this Episode%    Precautions:  Range of Motion Restrictions: - Patient to perform back stretches once in the morning and once at night.  - Patient to engage in core strengthening workouts, yoga, or Pilates.  - Patient to continue weight loss efforts through healthy eating and staying active.  - Patient to avoid heavy lifting or pushing to prevent further shoulder damage.  - Patient to sleep on back to avoid aggravating shoulder injury.  - HEP print out provided.      Subjective   pain still the same /  starting to get pain on the left.  Pain reported as 4/10.      Objective            Treatment:  Therapeutic Exercise  TE 1: UBE 5/5  TE 2: sidelying ER - 30 reps #2  TE 4: scapular elevation  - 30 reps  TE 6: Wand flexion (supine to seated to standing) - 30 reps  TE 10: prone shoulder rows, scapular  retractions  and latt extension - 30 reps      Time Entry(in minutes):  Therapeutic Exercise Time Entry: 30    Assessment & Plan   Assessment: Patient currently presents to therapy with reports of continued upper trapezius pain with increase pain with palpation. Patient reports pain in the mid back with therapeutic exercise performed. Patient displays weakness of the rhomboids. Patient required Verbal Cues 's for decrease upper trapezius usage with shoulder activations  Evaluation/Treatment Tolerance: Patient tolerated treatment well    The patient will continue to benefit from skilled outpatient physical therapy in order to address the deficits listed in the problem list on the initial evaluation, provide patient and family education, and maximize the patients level of independence in the home and community environments.     The patient's spiritual, cultural, and educational needs were considered, and the patient is agreeable to the plan of care and goals.           Plan: Continue with current POC    Goals:   Active       LTG       Patient will improve AROM shoulder flexion and abduction > 90 degrees  (Progressing)       Start:  06/26/25                Sudheer Whalen, PT

## 2025-07-15 NOTE — TELEPHONE ENCOUNTER
Called pt multiple times and left VM to reschedule his appointment on 7/17 due to provider not being in clinic during his original appointment time. Will reschuedle and send message in [employer].

## 2025-07-17 ENCOUNTER — HOSPITAL ENCOUNTER (OUTPATIENT)
Dept: RADIOLOGY | Facility: HOSPITAL | Age: 55
Discharge: HOME | End: 2025-07-17
Attending: PHYSICIAN ASSISTANT
Payer: MEDICAID

## 2025-07-17 ENCOUNTER — CLINICAL SUPPORT (OUTPATIENT)
Dept: REHABILITATION | Facility: HOSPITAL | Age: 55
End: 2025-07-17
Payer: MEDICAID

## 2025-07-17 ENCOUNTER — OFFICE VISIT (OUTPATIENT)
Dept: ORTHOPEDICS | Facility: CLINIC | Age: 55
End: 2025-07-17
Payer: MEDICAID

## 2025-07-17 DIAGNOSIS — S46.011D TRAUMATIC TEAR OF RIGHT ROTATOR CUFF, UNSPECIFIED TEAR EXTENT, SUBSEQUENT ENCOUNTER: ICD-10-CM

## 2025-07-17 DIAGNOSIS — M25.512 CHRONIC LEFT SHOULDER PAIN: ICD-10-CM

## 2025-07-17 DIAGNOSIS — G89.29 CHRONIC LEFT SHOULDER PAIN: ICD-10-CM

## 2025-07-17 DIAGNOSIS — M75.92 SUPRASPINATUS TENDINITIS, LEFT: Primary | ICD-10-CM

## 2025-07-17 DIAGNOSIS — M25.611 DECREASED RIGHT SHOULDER RANGE OF MOTION: Primary | ICD-10-CM

## 2025-07-17 PROCEDURE — 73030 X-RAY EXAM OF SHOULDER: CPT | Mod: 26,LT,, | Performed by: RADIOLOGY

## 2025-07-17 PROCEDURE — 3044F HG A1C LEVEL LT 7.0%: CPT | Mod: CPTII,,, | Performed by: PHYSICIAN ASSISTANT

## 2025-07-17 PROCEDURE — 97110 THERAPEUTIC EXERCISES: CPT | Mod: PN,CQ

## 2025-07-17 PROCEDURE — 4010F ACE/ARB THERAPY RXD/TAKEN: CPT | Mod: CPTII,,, | Performed by: PHYSICIAN ASSISTANT

## 2025-07-17 PROCEDURE — 99212 OFFICE O/P EST SF 10 MIN: CPT | Mod: S$PBB,,, | Performed by: PHYSICIAN ASSISTANT

## 2025-07-17 PROCEDURE — 73030 X-RAY EXAM OF SHOULDER: CPT | Mod: TC,LT

## 2025-07-17 RX ORDER — MELOXICAM 15 MG/1
15 TABLET ORAL
Qty: 30 TABLET | Refills: 0 | Status: SHIPPED | OUTPATIENT
Start: 2025-07-17

## 2025-07-17 NOTE — PROGRESS NOTES
[facility]  Orthopedic Surgery  Clinic Note      Subjective:   History of Present Illness    CHIEF COMPLAINT:  Patient presents today for follow-up of right shoulder pain and acute left shoulders.     MUSCULOSKELETAL:  He reports right shoulder pain at 4/10 intensity with improved range of motion and strength since starting physical therapy. He received an intra-articular steroid injection last month with symptomatic improvement. He also reports new onset left shoulder pain (6/10 severity) starting one week ago without clear traumatic event or injury mechanism.    MEDICAL HISTORY:  He has prediabetes with A1C of 5.7 and is compliant with diabetic medication.      ROS:  Musculoskeletal: +joint pain, -muscle pain, +pain with movement, +limited movement  Skin: -rash, -lesion  Neurological: -headache, -dizziness, -numbness, -tingling          Medications: I have reviewed medication list in the chart at the time of this encounter.     Review of patient's allergies indicates:   Allergen Reactions    Levonorgestrel-ethinyl estrad      Other reaction(s): sinus, running nose, cough, etc.    Morphine      Other reaction(s): Not available      Past Medical History:   Diagnosis Date    Anxiety     Arthritis     Back pain     Class 2 severe obesity due to excess calories with serious comorbidity and body mass index (BMI) of 38.0 to 38.9 in adult 04/21/2023    Depression     Diabetes mellitus     Hypertension     Mixed hyperlipidemia     Obesity     Renal manifestation of secondary diabetes mellitus     Trouble in sleeping     Type 2 diabetes mellitus with ophthalmic manifestations     Vitamin B12 deficiency 12/05/2022     No past surgical history on file.    Objective:     Physical Exam  Musculoskeletal:      Right shoulder: Crepitus present. No swelling or deformity. Normal strength.      Left shoulder: No swelling or deformity. Decreased range of motion. Decreased strength.      Right elbow: No swelling or deformity.       Left elbow: No swelling or deformity.          Right Shoulder Exam     Muscle Strength   Abduction: 5/5   Internal rotation: 5/5   External rotation: 5/5   Supraspinatus: 5/5   Subscapularis: 5/5   Biceps: 5/5     Other   Erythema: absent  Scars: absent  Pulse: present      Left Shoulder Exam     Muscle Strength   Abduction: 4/5   Internal rotation: 5/5   External rotation: 5/5   Supraspinatus: 4/5   Subscapularis: 5/5   Biceps: 5/5     Other   Erythema: absent  Scars: absent  Pulse: present            Right shoulder:  170/180° flexion.   160/180° abduction.   130/140° adduction.   -40/-60° extension.   Negative Painful arc test from °.   Negative Drop arm test.  Negative Adia's supraspinatus test (empty can) test.   Negative Belly press test.   Negative Lift off test.   Negative internal rotation lag sign.   Negative Abduction/external rotation test.  Negative external rotation lag sign.   Negative Speed's test.   Negative O'dax (active compression) test.     Left shoulder:  160/180° flexion.   160/180° abduction.   120/140° adduction.   -30/-60° extension.   Positive Painful arc test from °.   Negative Neer's impingement sign.   Negative Drop arm test.  Positive Adia's supraspinatus test (empty can) test.   Negative Hornblower's (TM weakness) sign.  Negative Belly press test.   Positive Lift off test.   Negative internal rotation lag sign.   Negative Abduction/external rotation test.  Negative external rotation lag sign.   Positive Speed's test.   Negative O'dax (active compression) test.   Negative Mati Sign.        Imaging:  I have independently interpreted and reviewed L shoulder XR obtained today in clinic with patient.    Assessment:       1. Supraspinatus tendinitis, left    2. Traumatic tear of right rotator cuff, unspecified tear extent, subsequent encounter    3. Chronic left shoulder pain       Plan:       Orders Placed This Encounter    X-Ray Shoulder Trauma 3 view Left     meloxicam (MOBIC) 15 MG tablet        Assessment & Plan    IMPRESSION:  - Right shoulder: Significant improvement in strength and pain reduction noted. No pain with provocative testing.  - Left shoulder: Acute pain likely due to accidental injury. Reduced range of motion and weakness with Superspinatus testing observed. XR Left Shoulder shows bone spurs on humeral head, possibly at Superspinatus tendon insertion site. No fractures, dislocations, or bone lytic lesions. Glenohumeral space preserved.  - Advised against repeating steroid injection for left shoulder at this time due to history of DM.  - Recommend oral anti-inflammatories for left shoulder pain management, given no history of CKD.    ACTION ITEMS/LIFESTYLE:  - Patient can use OTC topical creams and patches as needed for pain relief.  - Continue PT. I message PT team to see if we can add on to protocol vs another order for PT on the L side at a later time.     MEDICATIONS:  - Started Mobic daily for 7 days, then as needed for left shoulder pain. Patient to avoid other NSAIDs while taking prescribed anti-inflammatory medication.     Follow up:  - Follow up with me in 3 months or sooner if needed.         Future Appointments   Date Time Provider Department Center   7/22/2025  2:00 PM Michelle Gray PTA [facility]   7/22/2025  4:00 PM Sheyla Marie DPM [facility]   7/24/2025  2:00 PM Michelle Gray PTA [facility]   7/29/2025 11:00 AM [facility]   7/31/2025 11:00 AM Michelle Gray PTA [facility]   8/5/2025 11:00 AM Sudheer Whalen, PT [facility]   8/7/2025 11:00 AM Sudheer Whalen, PT [facility]   10/20/2025  5:00 PM Diana Arredondo PA-C [facility] Aldana Orpeggy Arredondo PA-C  Orthopedic Surgery  [facility]

## 2025-07-17 NOTE — PROGRESS NOTES
"  Outpatient Rehab    Physical Therapy Visit    Patient Name: Haseeb Reilly  MRN: [id number]  YOB: 1970  Encounter Date: 7/17/2025    Therapy Diagnosis:   Encounter Diagnosis   Name Primary?    Decreased right shoulder range of motion Yes     Physician: Diana Arredondo PA-C    Physician Orders: Eval and Treat  Medical Diagnosis: Traumatic tear of right rotator cuff, unspecified tear extent, subsequent encounter  Degenerative tear of glenoid labrum, right  Biceps tendinitis of right shoulder  Surgical Diagnosis: Not applicable for this Episode   Surgical Date: Not applicable for this Episode  Days Since Last Surgery: Not applicable for this Episode    Visit # / Visits Authorized:  3 / 10  Insurance Authorization Period: 6/26/2025 to 12/31/2025  Date of Evaluation: 6/26/2025  Plan of Care Certification:       PT/PTA: PTA   Number of PTA visits since last PT visit:1  Time In: 1100   Time Out: 1200  Total Time (in minutes): 60   Total Billable Time (in minutes): 30      Precautions:  Range of Motion Restrictions: - Patient to perform back stretches once in the morning and once at night.  - Patient to engage in core strengthening workouts, yoga, or Pilates.  - Patient to continue weight loss efforts through healthy eating and staying active.  - Patient to avoid heavy lifting or pushing to prevent further shoulder damage.  - Patient to sleep on back to avoid aggravating shoulder injury.  - HEP print out provided.      Subjective   Patient reports usual discomfort in his shoulder.  Pain reported as 6/10.      Objective            Treatment:  Therapeutic Exercise  TE 1: UBE 5/5  TE 2: Standing supine dowel flexion 3x10  TE 3: Shoulder isometrics 10x10" FLX,IR,ER,  TE 7: Pulley's FLX & ABD 3 min  TE 9: GTB rows 3x10  TE 10: RTB Shoulder extension 3x10      Time Entry(in minutes):  Therapeutic Exercise Time Entry: 60    Assessment & Plan   Assessment: Patient had increase in back pain with standing dowel " flexion. Other exercise were complete with proper fatigue.  Evaluation/Treatment Tolerance: Patient limited by pain    The patient will continue to benefit from skilled outpatient physical therapy in order to address the deficits listed in the problem list on the initial evaluation, provide patient and family education, and maximize the patients level of independence in the home and community environments.     The patient's spiritual, cultural, and educational needs were considered, and the patient is agreeable to the plan of care and goals.           Plan: Continue with current POC    Goals:   Active       LTG       Patient will improve AROM shoulder flexion and abduction > 90 degrees  (Progressing)       Start:  06/26/25                Michelle Gray PTA

## 2025-07-29 ENCOUNTER — CLINICAL SUPPORT (OUTPATIENT)
Dept: REHABILITATION | Facility: HOSPITAL | Age: 55
End: 2025-07-29
Payer: MEDICAID

## 2025-07-29 DIAGNOSIS — M25.611 DECREASED RIGHT SHOULDER RANGE OF MOTION: Primary | ICD-10-CM

## 2025-07-29 PROCEDURE — 97140 MANUAL THERAPY 1/> REGIONS: CPT | Mod: PN

## 2025-07-29 PROCEDURE — 97110 THERAPEUTIC EXERCISES: CPT | Mod: PN

## 2025-07-29 NOTE — PROGRESS NOTES
Outpatient Rehab    Physical Therapy Progress Note    Patient Name: Haseeb Reilly  MRN: [id number]  YOB: 1970  Encounter Date: 7/29/2025    Therapy Diagnosis:   Encounter Diagnosis   Name Primary?    Decreased right shoulder range of motion Yes     Physician: Diana Arredondo PA-C    Physician Orders: Eval and Treat  Medical Diagnosis: Traumatic tear of right rotator cuff, unspecified tear extent, subsequent encounter  Degenerative tear of glenoid labrum, right  Biceps tendinitis of right shoulder  Surgical Diagnosis: Not applicable for this Episode   Surgical Date: Not applicable for this Episode  Days Since Last Surgery: Not applicable for this Episode    Visit # / Visits Authorized:  4 / 10  Insurance Authorization Period: 6/26/2025 to 12/31/2025  Date of Evaluation: 6/26/2025  Plan of Care Certification:       PT/PTA: PT   Number of PTA visits since last PT visit:0  Time In: 1100   Time Out: 1200  Total Time (in minutes): 60   Total Billable Time (in minutes): 60    FOTO:  Intake Score (%): Not applicable for this Episode  Survey Score 2 (%): Not applicable for this Episode  Survey Score 3 (%): Not applicable for this Episode    Precautions:  Range of Motion Restrictions: - Patient to perform back stretches once in the morning and once at night.  - Patient to engage in core strengthening workouts, yoga, or Pilates.  - Patient to continue weight loss efforts through healthy eating and staying active.  - Patient to avoid heavy lifting or pushing to prevent further shoulder damage.  - Patient to sleep on back to avoid aggravating shoulder injury.  - HEP print out provided.    Subjective   Pt reports a sharp pain under the left shoulder blade (Pt reports pain with rotation, and shoulder elevation); however the right shoulder is feeling better.  Pain reported as 4/10. 5/10    Objective      Shoulder Range of Motion  Right Shoulder   Active (deg) Passive (deg) Pain   Flexion 145       Extension 120        Scaption 140       ABduction 95       ADduction         Horizontal ABduction         Horizontal ADduction         External Rotation (Shoulder ABducted 0 degrees) 45       External Rotation (Shoulder ABducted 45 degrees)         External Rotation (Shoulder ABducted 90 degrees)         Internal Rotation (Shoulder ABducted 0 degrees) 60       Internal Rotation (Shoulder ABducted 45 degrees)         Internal Rotation (Shoulder ABducted 90 degrees)           Left Shoulder   Active (deg) Passive (deg) Pain   Flexion 165       Extension 140       Scaption 160       ABduction 70       ADduction         Horizontal ABduction         Horizontal ADduction         External Rotation (Shoulder ABducted 0 degrees) 50       External Rotation (Shoulder ABducted 45 degrees)         External Rotation (Shoulder ABducted 90 degrees)         Internal Rotation (Shoulder ABducted 0 degrees) 50       Internal Rotation (Shoulder ABducted 45 degrees)         Internal Rotation (Shoulder ABducted 90 degrees)                       Shoulder Strength - Planes of Motion   Right Strength Right Pain Left Strength Left  Pain   Flexion 3+   4     Extension 3+   4     ABduction 3+   4     ADduction 3+   4     Horizontal ABduction           Horizontal ADduction           Internal Rotation 0° 3+   4     Internal Rotation 90°           External Rotation 0° 3+   4     External Rotation 90°                            Treatment:  Therapeutic Exercise  TE 1: UBE 5/5  TE 2: prayer stretch - 3x1'  TE 3: corner stretch - 1'x3  TE 6: Wand flexion (supine to seated to standing) - 30 reps  Manual Therapy  MT 1: Thera-gun - 10'      Time Entry(in minutes):       Assessment & Plan   Assessment: Patient currently presents to therapy with report of decreased right shoulder pain however reports pain the the inferior angle of the scapular. The pain was recreated to prayer stretch. Patient was issued as home exercise program. Patient will continue to benefit from  skilled therapy to address deficits.   Evaluation/Treatment Tolerance: Patient tolerated treatment well    The patient will continue to benefit from skilled outpatient physical therapy in order to address the deficits listed in the problem list on the initial evaluation, provide patient and family education, and maximize the patients level of independence in the home and community environments.     The patient's spiritual, cultural, and educational needs were considered, and the patient is agreeable to the plan of care and goals.           Plan: Continue with current POC    Goals:   Active       LTG       Patient will improve AROM shoulder flexion and abduction > 90 degrees  (Progressing)       Start:  06/26/25                Sudheer Whalen PT

## 2025-07-31 ENCOUNTER — CLINICAL SUPPORT (OUTPATIENT)
Dept: REHABILITATION | Facility: HOSPITAL | Age: 55
End: 2025-07-31
Payer: MEDICAID

## 2025-07-31 DIAGNOSIS — M25.611 DECREASED RIGHT SHOULDER RANGE OF MOTION: Primary | ICD-10-CM

## 2025-07-31 PROCEDURE — 97110 THERAPEUTIC EXERCISES: CPT | Mod: PN,CQ

## 2025-07-31 NOTE — PROGRESS NOTES
Outpatient Rehab    Physical Therapy Visit    Patient Name: Haseeb Reilly  MRN: [id number]  YOB: 1970  Encounter Date: 7/31/2025    Therapy Diagnosis:   Encounter Diagnosis   Name Primary?    Decreased right shoulder range of motion Yes     Physician: Diana Arredondo PA-C    Physician Orders: Eval and Treat  Medical Diagnosis: Traumatic tear of right rotator cuff, unspecified tear extent, subsequent encounter  Degenerative tear of glenoid labrum, right  Biceps tendinitis of right shoulder  Surgical Diagnosis: Not applicable for this Episode   Surgical Date: Not applicable for this Episode  Days Since Last Surgery: Not applicable for this Episode    Visit # / Visits Authorized:  5 / 10  Insurance Authorization Period: 6/26/2025 to 12/31/2025  Date of Evaluation: 6/26/2025  Plan of Care Certification:       PT/PTA: PTA   Number of PTA visits since last PT visit:1  Time In: 1100   Time Out: 1200  Total Time (in minutes): 60   Total Billable Time (in minutes): 30    FOTO:  Intake Score (%): Not applicable for this Episode  Survey Score 2 (%): Not applicable for this Episode  Survey Score 3 (%): Not applicable for this Episode    Precautions:  Range of Motion Restrictions: - Patient to perform back stretches once in the morning and once at night.  - Patient to engage in core strengthening workouts, yoga, or Pilates.  - Patient to continue weight loss efforts through healthy eating and staying active.  - Patient to avoid heavy lifting or pushing to prevent further shoulder damage.  - Patient to sleep on back to avoid aggravating shoulder injury.  - HEP print out provided.      Subjective   Pain below Scapula.  Pain reported as 4/10.      Objective            Treatment:  Therapeutic Exercise  TE 1: UBE 5/5  TE 2: prayer stretch - 3x30s  TE 3: corner stretch - 3x30s  TE 7: Pulley's FLX 3 min  TE 9: BTB rows 3x10  TE 10: GTB Shoulder extension 3x10      Time Entry(in minutes):  Therapeutic Exercise Time  Entry: 60    Assessment & Plan   Assessment: Pt fell while sitting on stool for prayer stretch. He reported no injury. PT inspected his back for bruising and palpated back for tender areas, pt denied any pain. Exercises tolerated well, Vcs given to stay out of painful ranges w/ dowel flex and pulleys. Haseeb will continue to benefit from skilled therapy.   Evaluation/Treatment Tolerance: Patient tolerated treatment well    The patient will continue to benefit from skilled outpatient physical therapy in order to address the deficits listed in the problem list on the initial evaluation, provide patient and family education, and maximize the patients level of independence in the home and community environments.     The patient's spiritual, cultural, and educational needs were considered, and the patient is agreeable to the plan of care and goals.           Plan: Continue with current POC    Goals:   Active       LTG       Patient will improve AROM shoulder flexion and abduction > 90 degrees  (Progressing)       Start:  06/26/25                Michelle Gray PTA

## 2025-08-02 DIAGNOSIS — N52.1 ERECTILE DYSFUNCTION DUE TO DISEASES CLASSIFIED ELSEWHERE: ICD-10-CM

## 2025-08-04 RX ORDER — TADALAFIL 20 MG/1
TABLET ORAL
Qty: 15 TABLET | Refills: 0 | Status: SHIPPED | OUTPATIENT
Start: 2025-08-04

## 2025-08-05 ENCOUNTER — CLINICAL SUPPORT (OUTPATIENT)
Dept: REHABILITATION | Facility: HOSPITAL | Age: 55
End: 2025-08-05
Payer: MEDICAID

## 2025-08-05 DIAGNOSIS — M25.611 DECREASED RIGHT SHOULDER RANGE OF MOTION: Primary | ICD-10-CM

## 2025-08-05 DIAGNOSIS — E11.9 TYPE 2 DIABETES MELLITUS WITHOUT COMPLICATION, WITHOUT LONG-TERM CURRENT USE OF INSULIN: ICD-10-CM

## 2025-08-05 PROCEDURE — 97110 THERAPEUTIC EXERCISES: CPT | Mod: PN

## 2025-08-05 NOTE — PROGRESS NOTES
Outpatient Rehab    Physical Therapy Visit    Patient Name: Haseeb Reilly  MRN: [id number]  YOB: 1970  Encounter Date: 8/5/2025    Therapy Diagnosis:   Encounter Diagnosis   Name Primary?    Decreased right shoulder range of motion Yes     Physician: Diana Arredondo PA-C    Physician Orders: Eval and Treat  Medical Diagnosis: Traumatic tear of right rotator cuff, unspecified tear extent, subsequent encounter  Degenerative tear of glenoid labrum, right  Biceps tendinitis of right shoulder  Surgical Diagnosis: Not applicable for this Episode   Surgical Date: Not applicable for this Episode  Days Since Last Surgery: Not applicable for this Episode    Visit # / Visits Authorized:  6 / 10  Insurance Authorization Period: 6/26/2025 to 12/31/2025  Date of Evaluation: 6/26/2025  Plan of Care Certification:       PT/PTA: PT   Number of PTA visits since last PT visit:0  Time In: 1100   Time Out: 1200  Total Time (in minutes): 60   Total Billable Time (in minutes): 60    FOTO:  Intake Score (%): Not applicable for this Episode  Survey Score 2 (%): Not applicable for this Episode  Survey Score 3 (%): Not applicable for this Episode    Precautions:  Range of Motion Restrictions: - Patient to perform back stretches once in the morning and once at night.  - Patient to engage in core strengthening workouts, yoga, or Pilates.  - Patient to continue weight loss efforts through healthy eating and staying active.  - Patient to avoid heavy lifting or pushing to prevent further shoulder damage.  - Patient to sleep on back to avoid aggravating shoulder injury.  - HEP print out provided.      Subjective   shoulder feels good with my pain medication.  Pain reported as 5/10. 5/10    Objective      Shoulder Range of Motion  Left Shoulder   Active (deg) Passive (deg) Pain   Flexion 165       Extension 140       Scaption         ABduction         ADduction         Horizontal ABduction         Horizontal ADduction        "  External Rotation (Shoulder ABducted 0 degrees)         External Rotation (Shoulder ABducted 45 degrees)         External Rotation (Shoulder ABducted 90 degrees)         Internal Rotation (Shoulder ABducted 0 degrees)         Internal Rotation (Shoulder ABducted 45 degrees)         Internal Rotation (Shoulder ABducted 90 degrees)                          Treatment:       Time Entry(in minutes):  Manual Therapy Time Entry: 15  Therapeutic Exercise Time Entry: 45    Assessment & Plan   Assessment: Patient currently presents to therapy with reports of decreased left shoulder pain with functional movement. Patient reported " I almost forgot about his appointment while outside changing my breaks." Patient reports pain an tenderness of the left upper trapezius with palpation and tolerated Integrative Dry Needling during today's treatment session. Patient will continue to benefit from skilled therapy.   Evaluation/Treatment Tolerance: Patient tolerated treatment well    The patient will continue to benefit from skilled outpatient physical therapy in order to address the deficits listed in the problem list on the initial evaluation, provide patient and family education, and maximize the patients level of independence in the home and community environments.     The patient's spiritual, cultural, and educational needs were considered, and the patient is agreeable to the plan of care and goals.           Plan: Continue with current POC    Goals:   Active       LTG       Patient will improve AROM shoulder flexion and abduction > 90 degrees  (Progressing)       Start:  06/26/25                Sudheer Whalen PT    "

## 2025-08-07 ENCOUNTER — CLINICAL SUPPORT (OUTPATIENT)
Dept: REHABILITATION | Facility: HOSPITAL | Age: 55
End: 2025-08-07
Payer: MEDICAID

## 2025-08-07 DIAGNOSIS — M25.611 DECREASED RIGHT SHOULDER RANGE OF MOTION: Primary | ICD-10-CM

## 2025-08-07 PROCEDURE — 97110 THERAPEUTIC EXERCISES: CPT | Mod: PN

## 2025-08-07 PROCEDURE — 97140 MANUAL THERAPY 1/> REGIONS: CPT | Mod: PN

## 2025-08-07 NOTE — PROGRESS NOTES
Outpatient Rehab    Physical Therapy Visit    Patient Name: Haseeb Reilly  MRN: [id number]  YOB: 1970  Encounter Date: 8/7/2025    Therapy Diagnosis:   Encounter Diagnosis   Name Primary?    Decreased right shoulder range of motion Yes     Physician: Diana Arredondo PA-C    Physician Orders: Eval and Treat  Medical Diagnosis: Traumatic tear of right rotator cuff, unspecified tear extent, subsequent encounter  Degenerative tear of glenoid labrum, right  Biceps tendinitis of right shoulder  Surgical Diagnosis: Not applicable for this Episode   Surgical Date: Not applicable for this Episode  Days Since Last Surgery: Not applicable for this Episode    Visit # / Visits Authorized:  7 / 10  Insurance Authorization Period: 6/26/2025 to 12/31/2025  Date of Evaluation: 6/26/2025  Plan of Care Certification:       PT/PTA: PT   Number of PTA visits since last PT visit:0  Time In: 1120   Time Out: 1200  Total Time (in minutes): 40   Total Billable Time (in minutes): 40    FOTO:  Intake Score (%): Not applicable for this Episode  Survey Score 2 (%): Not applicable for this Episode  Survey Score 3 (%): Not applicable for this Episode    Precautions:  Range of Motion Restrictions: - Patient to perform back stretches once in the morning and once at night.  - Patient to engage in core strengthening workouts, yoga, or Pilates.  - Patient to continue weight loss efforts through healthy eating and staying active.  - Patient to avoid heavy lifting or pushing to prevent further shoulder damage.  - Patient to sleep on back to avoid aggravating shoulder injury.  - HEP print out provided.      Subjective   twist my body and reached down and my shoulder  popped (occurs everyday).  Pain reported as 5/10.      Objective            Treatment:  Therapeutic Exercise  TE 1: UBE 5/5  TE 4: ER AROM - 30 reps  Manual Therapy  MT 2: IDN to right biecps tendon 4 melany amps x 10 min with 40mm needle      Time Entry(in  minutes):  Manual Therapy Time Entry: 20  Therapeutic Exercise Time Entry: 20    Assessment & Plan   Assessment: Patient currently presents to therapy with reports of continued right shoulder pain with functional activity. Patient reports a reduction in pain with Integrative Dry Needling to the right biceps tendon. Patient will continue to benefit form skilled therapy to address deficits.   Evaluation/Treatment Tolerance: Patient tolerated treatment well    The patient will continue to benefit from skilled outpatient physical therapy in order to address the deficits listed in the problem list on the initial evaluation, provide patient and family education, and maximize the patients level of independence in the home and community environments.     The patient's spiritual, cultural, and educational needs were considered, and the patient is agreeable to the plan of care and goals.           Plan: Continue with current POC    Goals:   Active       LTG       Patient will improve AROM shoulder flexion and abduction > 90 degrees  (Progressing)       Start:  06/26/25                Sudheer Whalen PT

## 2025-08-10 DIAGNOSIS — K59.04 CHRONIC IDIOPATHIC CONSTIPATION: ICD-10-CM

## 2025-08-10 DIAGNOSIS — K59.00 CONSTIPATION, UNSPECIFIED CONSTIPATION TYPE: ICD-10-CM

## 2025-08-11 RX ORDER — LINACLOTIDE 290 UG/1
CAPSULE, GELATIN COATED ORAL
Qty: 90 CAPSULE | Refills: 1 | Status: SHIPPED | OUTPATIENT
Start: 2025-08-11

## 2025-08-14 DIAGNOSIS — E11.9 TYPE 2 DIABETES MELLITUS WITHOUT COMPLICATION, WITHOUT LONG-TERM CURRENT USE OF INSULIN: ICD-10-CM

## 2025-08-14 RX ORDER — SEMAGLUTIDE 1.34 MG/ML
INJECTION, SOLUTION SUBCUTANEOUS
Qty: 9 ML | Refills: 0 | Status: SHIPPED | OUTPATIENT
Start: 2025-08-14

## 2025-08-14 RX ORDER — FAMOTIDINE 40 MG/1
TABLET, FILM COATED ORAL
Qty: 90 TABLET | Refills: 0 | OUTPATIENT
Start: 2025-08-14

## 2025-08-20 DIAGNOSIS — N52.1 ERECTILE DYSFUNCTION DUE TO DISEASES CLASSIFIED ELSEWHERE: ICD-10-CM

## 2025-08-20 RX ORDER — TADALAFIL 20 MG/1
TABLET ORAL
Qty: 15 TABLET | Refills: 11 | Status: SHIPPED | OUTPATIENT
Start: 2025-08-20

## 2025-08-27 DIAGNOSIS — E11.9 TYPE 2 DIABETES MELLITUS WITHOUT COMPLICATION: ICD-10-CM

## 2025-08-27 RX ORDER — LISINOPRIL AND HYDROCHLOROTHIAZIDE 20; 25 MG/1; MG/1
1 TABLET ORAL
Qty: 90 TABLET | Refills: 2 | Status: SHIPPED | OUTPATIENT
Start: 2025-08-27

## 2025-09-02 RX ORDER — MELOXICAM 15 MG/1
15 TABLET ORAL
Qty: 30 TABLET | Refills: 0 | Status: SHIPPED | OUTPATIENT
Start: 2025-09-02